# Patient Record
Sex: FEMALE | Race: WHITE | Employment: OTHER | URBAN - METROPOLITAN AREA
[De-identification: names, ages, dates, MRNs, and addresses within clinical notes are randomized per-mention and may not be internally consistent; named-entity substitution may affect disease eponyms.]

---

## 2017-03-23 NOTE — PROGRESS NOTES
SUBJECTIVE:                                                    Nisha Perez is a 61 year old female who presents to clinic today for the following health issues:      History of Present Illness   Depression & Anxiety Follow-up:     Depression/Anxiety:  Anxiety only    Status since last visit::  Stable    Other associated symptoms of anxiety::  None    Significant life event::  No    Current substance use::  Alcohol    Depression symptoms::  None      Medication Followup of Dexilant    Taking Medication as prescribed: yes    Side Effects:  None    Medication Helping Symptoms:  yes       Problem list and histories reviewed & adjusted, as indicated.  Additional history: as documented      Patient Active Problem List   Diagnosis     iamJOINT DERANGEMENT NEC-L/LEG     Subluxation of patella, acquired     Aftercare following joint replacement     Knee joint replacement by other means     Abnormal gait     Anxiety disorder     Advanced directives, counseling/discussion     Phobia, flying     Gastroesophageal reflux disease without esophagitis     Past Surgical History:   Procedure Laterality Date     APPENDECTOMY       ARTHRODESIS ANKLE      left     ARTHROPLASTY PATELLO-FEMORAL (KNEE)  2011    Procedure:ARTHROPLASTY PATELLO-FEMORAL (KNEE); Left Knee Mason Arthrolplasty Prosthesis, Removal of hardware left knee; Surgeon:TORI LLOYD; [facility]     ARTHROSCOPY KNEE      left     ARTHROSCOPY KNEE      right      SECTION      X 4     TONSILLECTOMY         Social History   Substance Use Topics     Smoking status: Never Smoker     Smokeless tobacco: Not on file     Alcohol use Yes      Comment: 4-6 drinks per week     Family History   Problem Relation Age of Onset     DIABETES No family hx of      Coronary Artery Disease No family hx of      Hypertension No family hx of      Hyperlipidemia No family hx of      CEREBROVASCULAR DISEASE No family hx of      Breast Cancer No family hx of      Anxiety  Disorder No family hx of      OSTEOPOROSIS No family hx of      Obesity No family hx of      Depression No family hx of      Colon Cancer No family hx of      Prostate Cancer No family hx of      Other Cancer No family hx of      MENTAL ILLNESS No family hx of      Substance Abuse No family hx of      Asthma No family hx of      Genetic Disorder No family hx of      Thyroid Disease No family hx of      Anesthesia Reaction No family hx of          Current Outpatient Prescriptions   Medication Sig Dispense Refill     dexlansoprazole (DEXILANT) 60 MG CPDR CR capsule Take 1 capsule (60 mg) by mouth daily 90 capsule 3     ALPRAZolam (XANAX) 0.25 MG tablet Take 1 tablet (0.25 mg) by mouth as needed for other (30 min before flying) 20 tablet 0     estradiol (ESTRACE) 1 MG tablet   3     zolpidem (AMBIEN) 5 MG tablet   0     cetirizine (ZYRTEC) 10 MG tablet Take 10 mg by mouth daily.       diphenhydrAMINE (BENADRYL) 25 MG tablet Take 25 mg by mouth every 6 hours as needed.       PROgesterone (PROMETRIUM) 100 MG capsule Take 100 mg by mouth daily.       ORDER FOR DME One CPM machine to go with patient.  Start 0-30deg and advance as tolerated per Dr. Dai's TKA protocol. 1 Device 0     Pseudoephedrine HCl (SUDAFED PO) Take  by mouth at onset of headache.       Allergies   Allergen Reactions     Latex Itching     burning     BP Readings from Last 3 Encounters:   03/28/17 140/90   09/13/16 108/80   04/20/16 128/88    Wt Readings from Last 3 Encounters:   03/28/17 114 lb (51.7 kg)   09/13/16 112 lb (50.8 kg)   04/20/16 117 lb 3.2 oz (53.2 kg)                  Labs reviewed in EPIC    ROS:  Constitutional, HEENT, cardiovascular, pulmonary, gi and gu systems are negative, except as otherwise noted.    OBJECTIVE:                                                    /90  Pulse 74  Temp 98.4  F (36.9  C)  Wt 114 lb (51.7 kg)  BMI 24.67 kg/m2  Body mass index is 24.67 kg/(m^2).  Physical Exam   Constitutional: She appears  well-developed and well-nourished.   HENT:   Head: Normocephalic and atraumatic.   Cardiovascular: Normal rate and regular rhythm.    Pulmonary/Chest: Effort normal and breath sounds normal.   Psychiatric: She has a normal mood and affect.         Diagnostic Test Results:  none      ASSESSMENT/PLAN:                                                      Problem List Items Addressed This Visit     Phobia, flying     [place]   Short scipt for xanax given for flying   Discussed against driving on these meds  No concerns for abuse         Relevant Medications    ALPRAZolam (XANAX) 0.25 MG tablet    Gastroesophageal reflux disease without esophagitis - Primary     Stable on Dexilant  Refill provided  Discussed diet and lifestyle changes         Relevant Medications    dexlansoprazole (DEXILANT) 60 MG CPDR CR capsule      Other Visit Diagnoses     Need for hepatitis C screening test        Relevant Orders    Hepatitis C Screen Reflex to HCV RNA Quant and Genotype             Olivia Kaur MD  [facility]

## 2017-03-28 ENCOUNTER — OFFICE VISIT (OUTPATIENT)
Dept: FAMILY MEDICINE | Facility: OTHER | Age: 62
End: 2017-03-28
Payer: COMMERCIAL

## 2017-03-28 VITALS
WEIGHT: 114 LBS | SYSTOLIC BLOOD PRESSURE: 140 MMHG | BODY MASS INDEX: 24.67 KG/M2 | HEART RATE: 74 BPM | DIASTOLIC BLOOD PRESSURE: 90 MMHG | TEMPERATURE: 98.4 F

## 2017-03-28 DIAGNOSIS — Z11.59 NEED FOR HEPATITIS C SCREENING TEST: ICD-10-CM

## 2017-03-28 DIAGNOSIS — K21.9 GASTROESOPHAGEAL REFLUX DISEASE WITHOUT ESOPHAGITIS: Primary | ICD-10-CM

## 2017-03-28 DIAGNOSIS — F40.243 PHOBIA, FLYING: ICD-10-CM

## 2017-03-28 PROCEDURE — 99213 OFFICE O/P EST LOW 20 MIN: CPT | Performed by: FAMILY MEDICINE

## 2017-03-28 RX ORDER — DEXLANSOPRAZOLE 60 MG/1
60 CAPSULE, DELAYED RELEASE ORAL DAILY
Qty: 90 CAPSULE | Refills: 3 | Status: SHIPPED | OUTPATIENT
Start: 2017-03-28 | End: 2019-03-11

## 2017-03-28 RX ORDER — ALPRAZOLAM 0.25 MG
0.25 TABLET ORAL PRN
Qty: 20 TABLET | Refills: 0 | Status: SHIPPED | OUTPATIENT
Start: 2017-03-28 | End: 2019-02-27

## 2017-03-28 ASSESSMENT — PAIN SCALES - GENERAL: PAINLEVEL: NO PAIN (0)

## 2017-03-28 ASSESSMENT — ANXIETY QUESTIONNAIRES
7. FEELING AFRAID AS IF SOMETHING AWFUL MIGHT HAPPEN: 0 = NOT AT ALL
GAD7 TOTAL SCORE: 0

## 2017-03-28 NOTE — ASSESSMENT & PLAN NOTE
[place]   Short scipt for xanax given for flying   Discussed against driving on these meds  No concerns for abuse

## 2017-04-18 ENCOUNTER — ALLIED HEALTH/NURSE VISIT (OUTPATIENT)
Dept: FAMILY MEDICINE | Facility: OTHER | Age: 62
End: 2017-04-18
Payer: COMMERCIAL

## 2017-04-18 DIAGNOSIS — H61.22 IMPACTED CERUMEN OF LEFT EAR: Primary | ICD-10-CM

## 2017-04-18 PROCEDURE — 99207 ZZC NO CHARGE NURSE ONLY: CPT

## 2017-04-18 NOTE — PROGRESS NOTES
Nisha Perez is a 61 year old female who presents in clinic for possible impacted cerumen.    SYMPTOMS:  Pt reports bilateral ears feel plugged and has some muffled hearing. Was on an airplane recently and felt them popping. Reports L side is worse.    Hx of cerumen impaction?   Yes  Hx of surgery or rupture?  No (if yes, huddle with provider)  OBJECTIVE:  Patient appears in no distress  HEENT: left side  ear canal occluded with cerumen.  Reports feeling some pressure. No impaction visualized by RN or provider on R side.     PLAN:  Cerumenosis is noted.  Wax to be removed on L ear by MA staff by syringing and manual debridement.   MA staff could only obtained small amount via flush from L side. Provider needed for manual debridement.    NURSING PLAN: Huddle with provider, plan includes ok for removal on L side. Needed manual debridement.     RECOMMENDED DISPOSITION: Provider recommends debrox drops for small amount of remaining wax. Return for new, worsening sxs.  Will comply with recommendation: Yes     Message handled by Nurse Triage with Huddle - provider name: Darby Bocanegra NP.    PARRIS GALINDO RN

## 2017-04-18 NOTE — PROGRESS NOTES
Earwash attempted. Not much success getting wax out. Huddled with ALFONSO Suggs again and she took it from there.

## 2017-04-18 NOTE — MR AVS SNAPSHOT
"              After Visit Summary   2017    Nisha Perez    MRN: [id number]           Patient Information     Date Of Birth          1955        Visit Information        Provider Department      2017 10:30 AM [facility], [facility]        Today's Diagnoses     Impacted cerumen of left ear    -  1       Follow-ups after your visit        Who to contact     If you have questions or need follow up information about today's clinic visit or your schedule please contact [facility] directly at [phone].  Normal or non-critical lab and imaging results will be communicated to you by Speekhart, letter or phone within 4 business days after the clinic has received the results. If you do not hear from us within 7 days, please contact the clinic through Speekhart or phone. If you have a critical or abnormal lab result, we will notify you by phone as soon as possible.  Submit refill requests through Solavista or call your pharmacy and they will forward the refill request to us. Please allow 3 business days for your refill to be completed.          Additional Information About Your Visit        MyChart Information     Solavista lets you send messages to your doctor, view your test results, renew your prescriptions, schedule appointments and more. To sign up, go to [email or web address] . Click on \"Log in\" on the left side of the screen, which will take you to the Welcome page. Then click on \"Sign up Now\" on the right side of the page.     You will be asked to enter the access code listed below, as well as some personal information. Please follow the directions to create your username and password.     Your access code is: [email or web address]  Expires: 2017 10:25 AM     Your access code will  in 90 days. If you need help or a new code, please call your [facility] or [phone].        Care EveryWhere ID     This is your Care EveryWhere ID. This could be used by " other organizations to access your [place] medical records  [id number]         Blood Pressure from Last 3 Encounters:   03/28/17 140/90   09/13/16 108/80   04/20/16 128/88    Weight from Last 3 Encounters:   03/28/17 114 lb (51.7 kg)   09/13/16 112 lb (50.8 kg)   04/20/16 117 lb 3.2 oz (53.2 kg)              Today, you had the following     No orders found for display       Primary Care Provider Office Phone # Fax #    Roderick Hunter [phone]       [facility] [address] [facility] [id number]        Thank you!     Thank you for choosing [facility]  for your care. Our goal is always to provide you with excellent care. Hearing back from our patients is one way we can continue to improve our services. Please take a few minutes to complete the written survey that you may receive in the mail after your visit with us. Thank you!             Your Updated Medication List - Protect others around you: Learn how to safely use, store and throw away your medicines at www.disposemymeds.org.          This list is accurate as of: 4/18/17 11:20 AM.  Always use your most recent med list.                   Brand Name Dispense Instructions for use    ALPRAZolam 0.25 MG tablet    XANAX    20 tablet    Take 1 tablet (0.25 mg) by mouth as needed for other (30 min before flying)       BENADRYL 25 MG tablet   Generic drug:  diphenhydrAMINE      Take 25 mg by mouth every 6 hours as needed.       dexlansoprazole 60 MG Cpdr CR capsule    DEXILANT    90 capsule    Take 1 capsule (60 mg) by mouth daily       estradiol 1 MG tablet    ESTRACE         order for DME     1 Device    One CPM machine to go with patient. Start 0-30deg and advance as tolerated per Dr. Dai's TKA protocol.       progesterone 100 MG capsule    PROMETRIUM     Take 100 mg by mouth daily.       SUDAFED PO      Take  by mouth at onset of headache.       zolpidem 5 MG tablet    AMBIEN         ZYRTEC 10 MG tablet    Generic drug:  cetirizine      Take 10 mg by mouth daily.

## 2017-08-17 ENCOUNTER — TELEPHONE (OUTPATIENT)
Dept: FAMILY MEDICINE | Facility: CLINIC | Age: 62
End: 2017-08-17

## 2017-08-17 NOTE — TELEPHONE ENCOUNTER
Summary:    Patient is due/failing the following:   COLONOSCOPY and LDL    Action needed:   Patient needs fasting lab only appointment and schedule a colonoscopy or complete a FIT test     Type of outreach:    none per care everywhere UTD    Questions for provider review:    None                                                                                                                                    Ofelia Riggs  Please abstract the following data from this visit with this patient into the appropriate field in Epic:    Colonoscopy done on this date: 05/1/2010 (approximately), by this group: [facility], in care everywhere ( patient reported)      Chart routed to [employer]  .        Panel Management Review      Patient has the following on her problem list: None      Composite cancer screening  Chart review shows that this patient is due/due soon for the following Colonoscopy

## 2018-01-05 ENCOUNTER — TELEPHONE (OUTPATIENT)
Dept: FAMILY MEDICINE | Facility: CLINIC | Age: 63
End: 2018-01-05

## 2018-01-05 NOTE — TELEPHONE ENCOUNTER
RN triage requested--    Did remove my name as PCP--usually seen in [place] and I have only seen this patient once for an acute problem.    Roderick Hunter MD     Detail Level: Zone Include Location In Plan?: No

## 2018-01-05 NOTE — TELEPHONE ENCOUNTER
"Nisha Perez is a 62 year old female who calls with thumb pain and swelling.    NURSING ASSESSMENT:  Description:  I spoke with patient who stated yesterday her thumb has started to swell and is painful.   Onset/duration:  Yesterday  Precip. factors:  Has always had \"arthritis\" in this thumb  Associated symptoms:  Feels like she has all over joint pain discomfort. Pt also states she has a ring she is unable to remove.  Improves/worsens symptoms:  Has tried icing and ibuprofen with some improvement with discomfort but not swelling  Pain scale (0-10)   6-7/10  Last exam/Treatment:  3/28/2017    Allergies:   Allergies   Allergen Reactions     Latex Itching     burning     RECOMMENDED DISPOSITION:  To ED-Huddled with KL  Will comply with recommendation: Yes  If further questions/concerns or if symptoms do not improve, worsen or new symptoms develop, call your PCP or [facility] as soon as possible.      Guideline used: joint pain/swelling  Telephone Triage Protocols for Nurses, Fifth Edition, Emilia Dominguez RN    "

## 2018-01-05 NOTE — TELEPHONE ENCOUNTER
Reason for call:  Symptom  Reason for call:  Patient reporting a symptom    Symptom or request: right thumb pain and swelling    Duration (how long have symptoms been present): over night    Have you been treated for this before? Yes    Additional comments: pt has arthritis in her thumb joint but she isnt sure why she woke up with her thumb swelled up and is throbbing. Please advise.     Phone Number patient can be reached at:  Cell number on file:    Telephone Information:   Mobile [phone]       Best Time:  anytime    Can we leave a detailed message on this number:  YES    Call taken on 1/5/2018 at 7:57 AM by Luba Santos

## 2018-04-23 ENCOUNTER — TELEPHONE (OUTPATIENT)
Dept: FAMILY MEDICINE | Facility: OTHER | Age: 63
End: 2018-04-23

## 2018-04-23 DIAGNOSIS — K21.9 GASTROESOPHAGEAL REFLUX DISEASE WITHOUT ESOPHAGITIS: ICD-10-CM

## 2018-04-23 RX ORDER — DEXLANSOPRAZOLE 60 MG/1
60 CAPSULE, DELAYED RELEASE ORAL DAILY
Qty: 90 CAPSULE | Refills: 3 | Status: CANCELLED | OUTPATIENT
Start: 2018-04-23

## 2018-04-23 NOTE — TELEPHONE ENCOUNTER
Completed physical with [facility] and has completed it this year already. Discussed having her other provider to refill medication. Patient will contact [facility] where she completed her physical. If she needs refill through [facility] she will call back. Discussed RN is only able to give 30 days supply as her LOV was 03/28/2017. Patient stated she needs the prescription to be written for 90 day supply due to cost. Will await to hear back from patient.  Cristiane Cota, RN, BSN

## 2018-04-23 NOTE — TELEPHONE ENCOUNTER
Reason for Call:  Other prescription Dexilant 60 MG    Detailed comments: Pt calling back, stating that her [place] [facility] won't refill her rx for the amount of MG it is, but patient can't lower the dose any, so she is coming back here to refill it. She states she gets it from [place] and needs the rx faxed over there: the number is: [phone] and the patient's ID number is: [id number] She requests a call back.     Phone Number Patient can be reached at: Home number on file [phone] (home)    Best Time: any     Can we leave a detailed message on this number? YES    Call taken on 4/23/2018 at 1:16 PM by Nhung Fink

## 2018-04-23 NOTE — TELEPHONE ENCOUNTER
Reason for call:  Medication  Reason for Call:  Medication or medication refill:    Do you use a [facility]?  Name of the pharmacy and phone number for the current request:  Patient wants to pick it up cause she gets it in [place]    Name of the medication requested: Dexilant 60 mg     Other request: none    Can we leave a detailed message on this number? YES    Phone number patient can be reached at: Cell number on file:    Telephone Information:   Mobile [phone]       Best Time: anytime    Call taken on 4/23/2018 at 11:27 AM by Adal Cuevas

## 2018-04-23 NOTE — TELEPHONE ENCOUNTER
Pt notified. She states her OBGYN filled this. RN please remove med and close encounter.  Birdie Mendoza, CMA

## 2018-04-23 NOTE — TELEPHONE ENCOUNTER
dexlansoprazole (DEXILANT) 60 MG CPDR CR capsule  Routing refill request to provider for review/approval because:  Patient needs to be seen because it has been more than 1 year since last office visit.  Patient is completing physicals with her OB/GYN in [place], that provider will not fill this prescription due to the does.   Patient is requesting 90 day supplies  Cristiane Cota RN, BSN

## 2018-06-06 ENCOUNTER — RADIANT APPOINTMENT (OUTPATIENT)
Dept: MAMMOGRAPHY | Facility: OTHER | Age: 63
End: 2018-06-06
Payer: COMMERCIAL

## 2018-06-06 DIAGNOSIS — Z12.31 VISIT FOR SCREENING MAMMOGRAM: ICD-10-CM

## 2018-06-06 PROCEDURE — 77067 SCR MAMMO BI INCL CAD: CPT | Mod: TC

## 2018-06-16 ENCOUNTER — HEALTH MAINTENANCE LETTER (OUTPATIENT)
Age: 63
End: 2018-06-16

## 2018-06-19 ENCOUNTER — TRANSFERRED RECORDS (OUTPATIENT)
Dept: HEALTH INFORMATION MANAGEMENT | Facility: CLINIC | Age: 63
End: 2018-06-19

## 2018-06-19 LAB
CHOLEST SERPL-MCNC: 292 MG/DL
GLUCOSE SERPL-MCNC: 92 MG/DL (ref 74–106)
HDLC SERPL-MCNC: 92 MG/DL
LDLC SERPL CALC-MCNC: 181 MG/DL
TRIGL SERPL-MCNC: 94 MG/DL
TSH SERPL-ACNC: 1.43 UIU/ML (ref 0.36–3.74)

## 2018-10-02 ENCOUNTER — ANESTHESIA (OUTPATIENT)
Dept: GASTROENTEROLOGY | Facility: CLINIC | Age: 63
End: 2018-10-02
Payer: COMMERCIAL

## 2018-10-02 ENCOUNTER — HOSPITAL ENCOUNTER (OUTPATIENT)
Facility: CLINIC | Age: 63
Discharge: HOME | End: 2018-10-02
Attending: COLON & RECTAL SURGERY | Admitting: COLON & RECTAL SURGERY
Payer: COMMERCIAL

## 2018-10-02 ENCOUNTER — ANESTHESIA EVENT (OUTPATIENT)
Dept: GASTROENTEROLOGY | Facility: CLINIC | Age: 63
End: 2018-10-02
Payer: COMMERCIAL

## 2018-10-02 ENCOUNTER — SURGERY (OUTPATIENT)
Age: 63
End: 2018-10-02

## 2018-10-02 VITALS
SYSTOLIC BLOOD PRESSURE: 115 MMHG | DIASTOLIC BLOOD PRESSURE: 80 MMHG | OXYGEN SATURATION: 98 % | RESPIRATION RATE: 16 BRPM | HEIGHT: 58 IN | BODY MASS INDEX: 25.19 KG/M2 | WEIGHT: 120 LBS

## 2018-10-02 LAB — COLONOSCOPY: NORMAL

## 2018-10-02 PROCEDURE — 25000128 H RX IP 250 OP 636: Performed by: NURSE ANESTHETIST, CERTIFIED REGISTERED

## 2018-10-02 PROCEDURE — 40000010 ZZH STATISTIC ANES STAT CODE-CRNA PER MINUTE: Performed by: COLON & RECTAL SURGERY

## 2018-10-02 PROCEDURE — 37000009 ZZH ANESTHESIA TECHNICAL FEE, EACH ADDTL 15 MIN: Performed by: COLON & RECTAL SURGERY

## 2018-10-02 PROCEDURE — 88305 TISSUE EXAM BY PATHOLOGIST: CPT | Performed by: COLON & RECTAL SURGERY

## 2018-10-02 PROCEDURE — 25000125 ZZHC RX 250: Performed by: NURSE ANESTHETIST, CERTIFIED REGISTERED

## 2018-10-02 PROCEDURE — 45385 COLONOSCOPY W/LESION REMOVAL: CPT | Performed by: COLON & RECTAL SURGERY

## 2018-10-02 PROCEDURE — 37000008 ZZH ANESTHESIA TECHNICAL FEE, 1ST 30 MIN: Performed by: COLON & RECTAL SURGERY

## 2018-10-02 PROCEDURE — 88305 TISSUE EXAM BY PATHOLOGIST: CPT | Mod: 26 | Performed by: COLON & RECTAL SURGERY

## 2018-10-02 RX ORDER — FLUMAZENIL 0.1 MG/ML
0.2 INJECTION, SOLUTION INTRAVENOUS
Status: DISCONTINUED | OUTPATIENT
Start: 2018-10-02 | End: 2018-10-02 | Stop reason: HOSPADM

## 2018-10-02 RX ORDER — PROPOFOL 10 MG/ML
INJECTION, EMULSION INTRAVENOUS CONTINUOUS PRN
Status: DISCONTINUED | OUTPATIENT
Start: 2018-10-02 | End: 2018-10-02

## 2018-10-02 RX ORDER — NALOXONE HYDROCHLORIDE 0.4 MG/ML
.1-.4 INJECTION, SOLUTION INTRAMUSCULAR; INTRAVENOUS; SUBCUTANEOUS
Status: DISCONTINUED | OUTPATIENT
Start: 2018-10-02 | End: 2018-10-02 | Stop reason: HOSPADM

## 2018-10-02 RX ORDER — LIDOCAINE 40 MG/G
CREAM TOPICAL
Status: DISCONTINUED | OUTPATIENT
Start: 2018-10-02 | End: 2018-10-02 | Stop reason: HOSPADM

## 2018-10-02 RX ORDER — ONDANSETRON 2 MG/ML
4 INJECTION INTRAMUSCULAR; INTRAVENOUS EVERY 6 HOURS PRN
Status: DISCONTINUED | OUTPATIENT
Start: 2018-10-02 | End: 2018-10-02 | Stop reason: HOSPADM

## 2018-10-02 RX ORDER — PROPOFOL 10 MG/ML
INJECTION, EMULSION INTRAVENOUS PRN
Status: DISCONTINUED | OUTPATIENT
Start: 2018-10-02 | End: 2018-10-02

## 2018-10-02 RX ORDER — SODIUM CHLORIDE, SODIUM LACTATE, POTASSIUM CHLORIDE, CALCIUM CHLORIDE 600; 310; 30; 20 MG/100ML; MG/100ML; MG/100ML; MG/100ML
INJECTION, SOLUTION INTRAVENOUS CONTINUOUS PRN
Status: DISCONTINUED | OUTPATIENT
Start: 2018-10-02 | End: 2018-10-02

## 2018-10-02 RX ORDER — ONDANSETRON 2 MG/ML
4 INJECTION INTRAMUSCULAR; INTRAVENOUS
Status: DISCONTINUED | OUTPATIENT
Start: 2018-10-02 | End: 2018-10-02 | Stop reason: HOSPADM

## 2018-10-02 RX ORDER — ONDANSETRON 4 MG/1
4 TABLET, ORALLY DISINTEGRATING ORAL EVERY 6 HOURS PRN
Status: DISCONTINUED | OUTPATIENT
Start: 2018-10-02 | End: 2018-10-02 | Stop reason: HOSPADM

## 2018-10-02 RX ORDER — PROCHLORPERAZINE MALEATE 10 MG
10 TABLET ORAL EVERY 6 HOURS PRN
Status: DISCONTINUED | OUTPATIENT
Start: 2018-10-02 | End: 2018-10-02 | Stop reason: HOSPADM

## 2018-10-02 RX ADMIN — PROPOFOL 20 MG: 10 INJECTION, EMULSION INTRAVENOUS at 10:41

## 2018-10-02 RX ADMIN — SODIUM CHLORIDE, POTASSIUM CHLORIDE, SODIUM LACTATE AND CALCIUM CHLORIDE: 600; 310; 30; 20 INJECTION, SOLUTION INTRAVENOUS at 10:37

## 2018-10-02 RX ADMIN — PROPOFOL 20 MG: 10 INJECTION, EMULSION INTRAVENOUS at 10:44

## 2018-10-02 RX ADMIN — PROPOFOL 150 MCG/KG/MIN: 10 INJECTION, EMULSION INTRAVENOUS at 10:40

## 2018-10-02 RX ADMIN — DEXMEDETOMIDINE HYDROCHLORIDE 8 MCG: 100 INJECTION, SOLUTION INTRAVENOUS at 10:44

## 2018-10-02 RX ADMIN — PROPOFOL 10 MG: 10 INJECTION, EMULSION INTRAVENOUS at 11:09

## 2018-10-02 RX ADMIN — PROPOFOL 10 MG: 10 INJECTION, EMULSION INTRAVENOUS at 10:49

## 2018-10-02 NOTE — ANESTHESIA CARE TRANSFER NOTE
Patient: Nisha Perez    Procedure(s):  COLONOSCOPY - Wound Class: II-Clean Contaminated    Diagnosis: SCREENING  Diagnosis Additional Information: No value filed.    Anesthesia Type:   MAC     Note:  Airway :Room Air  Patient transferred to:PACU  Handoff Report: Identifed the Patient, Identified the Reponsible Provider, Reviewed the pertinent medical history, Discussed the surgical course, Reviewed Intra-OP anesthesia mangement and issues during anesthesia, Set expectations for post-procedure period and Allowed opportunity for questions and acknowledgement of understanding    After procedure in [facility] Procedure [place] under monitored anesthesia care (MAC), patient exhibited spontaneous respirations, patient breathing room air with oxygen saturation maintained greater than 95%, patient brought to [facility] recovery bay for postprocedure recovery, SpO2, NiBP, and EKG monitors and alarms on and functioning, report on patient's clinical status given to PACU RN, RN questions answered.    Electronically Signed By: MAKENNA Caballero CRNA  October 2, 2018  11:21 AM

## 2018-10-02 NOTE — ANESTHESIA POSTPROCEDURE EVALUATION
Patient: Nisha Perez    Procedure(s):  COLONOSCOPY - Wound Class: II-Clean Contaminated    Diagnosis:SCREENING  Diagnosis Additional Information: No value filed.    Anesthesia Type:  MAC    Note:  Anesthesia Post Evaluation    Patient location during evaluation: PACU  Patient participation: Able to fully participate in evaluation  Level of consciousness: awake  Pain management: adequate  Airway patency: patent  Cardiovascular status: acceptable  Respiratory status: acceptable  Hydration status: acceptable  PONV: none     Anesthetic complications: None          Last vitals:  Vitals:    10/02/18 1140 10/02/18 1150 10/02/18 1151   BP: 119/83 115/80    Resp:      SpO2:   98%         Electronically Signed By: Ronald Holman MD  October 2, 2018  1:19 PM

## 2018-10-02 NOTE — ANESTHESIA PREPROCEDURE EVALUATION
Anesthesia Evaluation     .             ROS/MED HX    ENT/Pulmonary:  - neg pulmonary ROS     Neurologic:  - neg neurologic ROS     Cardiovascular:  - neg cardiovascular ROS       METS/Exercise Tolerance:  >4 METS   Hematologic:         Musculoskeletal:         GI/Hepatic:     (+) GERD Asymptomatic on medication,       Renal/Genitourinary:      (-) renal disease   Endo:         Psychiatric:         Infectious Disease:         Malignancy:         Other:                     Physical Exam  Normal systems: dental    Airway   Mallampati: II  TM distance: >3 FB  Neck ROM: full    Dental     Cardiovascular   Rhythm and rate: regular and normal      Pulmonary    breath sounds clear to auscultation                    Anesthesia Plan      History & Physical Review  History and physical reviewed and following examination; no interval change.    ASA Status:  2 .        Plan for MAC          Postoperative Care      Consents  Anesthetic plan, risks, benefits and alternatives discussed with:  Patient..                          .

## 2018-10-02 NOTE — H&P
Pre-Endoscopy History and Physical   Nisha Perez MRN# [id number]   YOB: 1955 Age: 63 year old   Date of Procedure: 10/2/2018   Primary care provider: Olivia Kaur   Type of Endoscopy: colonoscopy   Reason for Procedure: screening   Type of Anesthesia Anticipated: Moderate Sedation   HPI:   Nisha is a 63 year old female for screening colonoscopy.  She last had a colonoscopy in 2007 that was incomplete - this was followed by a barium enema which was normal.  She denies BRBPR, abdominal pain, nausea/vomiting, changes in bowel habits or unintentional weight loss.  Her mother had colon cancer in her 80s.    Allergies   Allergen Reactions     Latex Itching     burning      Prior to Admission Medications   Prescriptions Last Dose Informant Patient Reported? Taking?   ALPRAZolam (XANAX) 0.25 MG tablet More than a month  No No   Sig: Take 1 tablet (0.25 mg) by mouth as needed for other (30 min before flying)   GABAPENTIN PO 10/2/2018  Yes Yes   Sig: Take 300 mg by mouth 3 times daily   ORDER FOR DME   No No   Sig: One CPM machine to go with patient.  Start 0-30deg and advance as tolerated per Dr. Dai's TKA protocol.   Pseudoephedrine HCl (SUDAFED PO) Past Week  Yes Yes   Sig: Take  by mouth at onset of headache.   cetirizine (ZYRTEC) 10 MG tablet 10/2/2018  Yes Yes   Sig: Take 10 mg by mouth daily.   dexlansoprazole (DEXILANT) 60 MG CPDR CR capsule 10/2/2018  No Yes   Sig: Take 1 capsule (60 mg) by mouth daily   diphenhydrAMINE (BENADRYL) 25 MG tablet Past Week  Yes Yes   Sig: Take 25 mg by mouth every 6 hours as needed.   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 means     Abnormal gait     Anxiety disorder     Advanced directives, counseling/discussion      "Phobia, flying     Gastroesophageal reflux disease without esophagitis      Past Medical History:   Diagnosis Date     Anxiety disorder      Gastro-oesophageal reflux disease     esophagitis     Injury of knee, left      Insomnia      Osteoarthritis      Osteoporosis       Past Surgical History:   Procedure Laterality Date     APPENDECTOMY       ARTHRODESIS ANKLE      left     ARTHROPLASTY PATELLO-FEMORAL (KNEE)  2011    Procedure:ARTHROPLASTY PATELLO-FEMORAL (KNEE); Left Knee Latia Arthrolplasty Prosthesis, Removal of hardware left knee; Surgeon:TORI LLOYD; [facility]     ARTHROSCOPY KNEE      left     ARTHROSCOPY KNEE      right      SECTION      X 4     TONSILLECTOMY        Social History   Substance Use Topics     Smoking status: Never Smoker     Smokeless tobacco: Never Used     Alcohol use Yes      Comment: 4-6 drinks per week      Family History   Problem Relation Age of Onset     Colon Cancer Mother      Diabetes No family hx of      Coronary Artery Disease No family hx of      Hypertension No family hx of      Hyperlipidemia No family hx of      Cerebrovascular Disease No family hx of      Breast Cancer No family hx of      Anxiety Disorder No family hx of      Osteoporosis No family hx of      Obesity No family hx of      Depression No family hx of      Prostate Cancer No family hx of      Other Cancer No family hx of      Mental Illness No family hx of      Substance Abuse No family hx of      Asthma No family hx of      Genetic Disorder No family hx of      Thyroid Disease No family hx of      Anesthesia Reaction No family hx of       PHYSICAL EXAM:   BP (!) 166/106  Resp 16  Ht 1.473 m (4' 10\")  Wt 54.4 kg (120 lb)  SpO2 98%  BMI 25.08 kg/m2 Estimated body mass index is 25.08 kg/(m^2) as calculated from the following:    Height as of this encounter: 1.473 m (4' 10\").    Weight as of this encounter: 54.4 kg (120 lb).   RESP: lungs clear to auscultation - no rales, rhonchi or " wheezes   CV: regular rates and rhythm o}    Assessment: 64 y/o woman for screening colonoscopy    Plan: Colonoscopy with deep sedation.  Risks of the procedure were discussed including, but not limited to, bleeding, perforation and missed lesions.  Patient understands and is willing to proceed.    Daniel Magana MD ....................  10/2/2018   10:31 AM  Colon and Rectal Surgery Staff  [phone]

## 2018-10-03 LAB — COPATH REPORT: NORMAL

## 2018-12-04 ENCOUNTER — TELEPHONE (OUTPATIENT)
Dept: ORTHOPEDICS | Facility: CLINIC | Age: 63
End: 2018-12-04

## 2018-12-04 NOTE — TELEPHONE ENCOUNTER
----- Message from Angela Le sent at 12/4/2018  3:28 PM CST -----  Regarding: Pt wondering if she can see Dr. Dai for rt knee FU   Contact: [phone]  Pt wondering if she can see Dr. Dai for rt knee Follow-up. Pt had her left knee operated here at the  but had her rt knee done by someone else @ [facility]. Now her rt knee is in pain and was wondering if she can Follow-up with Dr. Dai. Please call pt to see if it's okay or no since Dr. Dai was not the one who operated on the rt knee. Pt can be reached at [phone].    Thank you,  Angela

## 2018-12-04 NOTE — TELEPHONE ENCOUNTER
Patient was called back and she stated that she had  Aright knee scope done by Dr. Pelayo at [facility] because Dr. Dai was out of the country.  She was offered and accepted an appointment on 2/5/2019 at 10:45am.  Her case will be reviewed with Dr. Dai next week and if she wants her to see one of her colleagues we will call the patient, if not then we will see her in February.  She is agreeable with this plan.

## 2018-12-12 ENCOUNTER — OFFICE VISIT (OUTPATIENT)
Dept: FAMILY MEDICINE | Facility: OTHER | Age: 63
End: 2018-12-12
Payer: COMMERCIAL

## 2018-12-12 VITALS
SYSTOLIC BLOOD PRESSURE: 122 MMHG | TEMPERATURE: 98 F | WEIGHT: 128.8 LBS | BODY MASS INDEX: 26.92 KG/M2 | OXYGEN SATURATION: 98 % | DIASTOLIC BLOOD PRESSURE: 90 MMHG | RESPIRATION RATE: 20 BRPM | HEART RATE: 104 BPM

## 2018-12-12 DIAGNOSIS — J01.90 ACUTE SINUSITIS WITH SYMPTOMS > 10 DAYS: Primary | ICD-10-CM

## 2018-12-12 DIAGNOSIS — H61.22 IMPACTED CERUMEN OF LEFT EAR: ICD-10-CM

## 2018-12-12 PROCEDURE — 99213 OFFICE O/P EST LOW 20 MIN: CPT | Mod: 25 | Performed by: FAMILY MEDICINE

## 2018-12-12 PROCEDURE — 69209 REMOVE IMPACTED EAR WAX UNI: CPT | Mod: LT | Performed by: FAMILY MEDICINE

## 2018-12-12 NOTE — PATIENT INSTRUCTIONS
Follow up if your symptoms do not improve or worsen after your antibiotic course. Please call for a prescription if you develop vaginal itchiness or discharge from your antibiotic use.

## 2018-12-12 NOTE — PROGRESS NOTES
SUBJECTIVE:   Nisha Perez is a 63 year old female who presents to clinic today for the following health issues:    Sinus Problem        Acute Illness   Acute illness concerns: Sinus Issues  Onset: 2 weeks    Fever: YES    Chills/Sweats: YES    Headache (front/face): YES    Sinus Pressure:YES    Conjunctivitis:  no    Ear Pain: no    Rhinorrhea: YES    Congestion: YES    Sore Throat: no      Cough: YES    Wheeze: no     Decreased Appetite: YES    Nausea: YES    Vomiting: no     Diarrhea:  no     Dysuria/Freq.: no     Fatigue/Achiness: YES    Sick/Strep Exposure: no      Therapies Tried and outcome: Sudafed/Mucinex. Helped a little     She has been ill for about two weeks now, and feels that it is settling in her sinuses, and also has left ear pain.       Problem list and histories reviewed & adjusted, as indicated.  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     Abnormal gait     Anxiety disorder     Advanced directives, counseling/discussion     Phobia, flying     Gastroesophageal reflux disease without esophagitis     Past Surgical History:   Procedure Laterality Date     APPENDECTOMY       ARTHRODESIS ANKLE      left     ARTHROPLASTY PATELLO-FEMORAL (KNEE)  2011    Procedure:ARTHROPLASTY PATELLO-FEMORAL (KNEE); Left Knee [place] Arthrolplasty Prosthesis, Removal of hardware left knee; Surgeon:TORI LLOYD; [facility]     ARTHROSCOPY KNEE      left     ARTHROSCOPY KNEE      right      SECTION      X 4     TONSILLECTOMY         Social History     Tobacco Use     Smoking status: Never Smoker     Smokeless tobacco: Never Used   Substance Use Topics     Alcohol use: Yes     Comment: 4-6 drinks per week     Family History   Problem Relation Age of Onset     Colon Cancer Mother      Diabetes No family hx of       Coronary Artery Disease No family hx of      Hypertension No family hx of      Hyperlipidemia No family hx of      Cerebrovascular Disease No family hx of      Breast Cancer No family hx of      Anxiety Disorder No family hx of      Osteoporosis No family hx of      Obesity No family hx of      Depression No family hx of      Prostate Cancer No family hx of      Other Cancer No family hx of      Mental Illness No family hx of      Substance Abuse No family hx of      Asthma No family hx of      Genetic Disorder No family hx of      Thyroid Disease No family hx of      Anesthesia Reaction No family hx of          Current Outpatient Medications   Medication Sig Dispense Refill     amoxicillin-clavulanate (AUGMENTIN) 875-125 MG tablet Take 1 tablet by mouth 2 times daily for 10 days 20 tablet 0     cetirizine (ZYRTEC) 10 MG tablet Take 10 mg by mouth daily.       dexlansoprazole (DEXILANT) 60 MG CPDR CR capsule Take 1 capsule (60 mg) by mouth daily 90 capsule 3     diphenhydrAMINE (BENADRYL) 25 MG tablet Take 25 mg by mouth every 6 hours as needed.       Pseudoephedrine HCl (SUDAFED PO) Take  by mouth at onset of headache.       zolpidem (AMBIEN) 5 MG tablet   0     ALPRAZolam (XANAX) 0.25 MG tablet Take 1 tablet (0.25 mg) by mouth as needed for other (30 min before flying) (Patient not taking: Reported on 12/12/2018) 20 tablet 0     GABAPENTIN PO Take 300 mg by mouth 3 times daily       Allergies   Allergen Reactions     Latex Itching     burning       ROS:  Constitutional, HEENT, cardiovascular, pulmonary, GI, , musculoskeletal, neuro, skin, endocrine and psych systems are negative, except as in HPI or otherwise noted.     This document serves as a record of the services and decisions personally performed and made by Corry Poole MD. It was created on her behalf by Mary Anne, a trained medical scribe. The creation of this document is based the provider's statements to the medical scribe.  Mary Anne, December  12, 2018 3:34 PM     OBJECTIVE:                                                    /90 (BP Location: Left arm, Patient Position: Chair, Cuff Size: Adult Large)   Pulse 104   Temp 98  F (36.7  C) (Oral)   Resp 20   Wt 58.4 kg (128 lb 12.8 oz)   SpO2 98%   Breastfeeding? No   BMI 26.92 kg/m    Body mass index is 26.92 kg/m .   GENERAL: healthy, alert, well nourished, well hydrated, no distress  HENT: ear canals- left canal partially blocked with cerumen; TMs- normal; Nose- normal; Mouth- no ulcers, no lesions; Sinuses- Left sided sinus tenderness  NECK: left sided cervical adenopathy  RESP: lungs clear to auscultation - no rales, no rhonchi, no wheezes  CV: regular rates and rhythm, normal S1 S2, no S3 or S4 and no murmur, no click or rub -  ABDOMEN: soft, no tenderness, no  hepatosplenomegaly, no masses, normal bowel sounds  SKIN: no suspicious lesions, no rashes to visible skin  PSYCH: Alert and oriented times 3; speech- coherent , normal rate and volume; able to articulate logical thoughts, able to abstract reason, no tangential thoughts, no hallucinations or delusions, affect- normal    Diagnostic test results:  No results found for this or any previous visit (from the past 24 hour(s)).     ASSESSMENT/PLAN:                                                        ICD-10-CM    1. Acute sinusitis with symptoms > 10 days J01.90 amoxicillin-clavulanate (AUGMENTIN) 875-125 MG tablet   2. Impacted cerumen of left ear H61.22 REMOVE IMPACTED CERUMEN     Sinus Infection: Left ear is washed out which relieves some of the discomfort, but the sinus pain and pressure is still present. Will treat with Augmentin course. Advised that this may cause loose stools.       Patient Instructions   Follow up if your symptoms do not improve or worsen after your antibiotic course. Please call for a prescription if you develop vaginal itchiness or discharge from your antibiotic use.      The information in this document, created  by the medical scribe for me, accurately reflects the services I personally performed and the decisions made by me. I have reviewed and approved this document for accuracy.   MD Corry Low MD, MD  [facility]

## 2018-12-20 ENCOUNTER — TELEPHONE (OUTPATIENT)
Dept: FAMILY MEDICINE | Facility: OTHER | Age: 63
End: 2018-12-20

## 2018-12-20 NOTE — TELEPHONE ENCOUNTER
AE- Pt is calling to say she is still not better. Has 3 doses of Augmentin left and states she is not feeling any better.    Ph[facility]acy-[facility] in [place]    Nisha Perez is a 63 year old female who calls with sinus infection.    NURSING ASSESSMENT:  Description:  Facial pain, HA, Congestion, drainage down throat. No fever. No chills and sweats. Nasal discharge is dark yellowish clear and thicker. Pain worse when she bends over. No ear pain. Upper teeth pain. Has 3 dose of the Augmentin left  Onset/duration:  Over 3 weeks    Allergies:   Allergies   Allergen Reactions     Latex Itching     burning     NURSING PLAN: Routed to provider Yes    RECOMMENDED DISPOSITION:  Wait to hear from provider  Will comply with recommendation: Yes  If further questions/concerns or if symptoms do not improve, worsen or new symptoms develop, call your PCP or [facility] as soon as possible.      Guideline used: sinus problems  Telephone Triage Protocols for Nurses, Fifth Edition, Emilia Dominguez RN

## 2018-12-20 NOTE — TELEPHONE ENCOUNTER
She has had 3 weeks of build up, it may still take longer. Would advise to complete the antibiotics unless something is worsening.  Corry Poole MD

## 2018-12-20 NOTE — TELEPHONE ENCOUNTER
Reason for call:  Patient reporting a symptom    Symptom or request: sinus inf ten days ago for sinus inf has been on tnbitiocs and still sick    Duration (how long have symptoms been present): ten days    Have you been treated for this before? Yes    Additional comments: was on antibiotics and is still sick. Please call to advise. Declined appt wanted to lm for nurse    Phone Number patient can be reached at:  Home number on file [phone] (home)    Best Time:  any    Can we leave a detailed message on this number:  YES    Call taken on 12/20/2018 at 1:41 PM by Falguni Cordova

## 2018-12-21 ENCOUNTER — OFFICE VISIT (OUTPATIENT)
Dept: FAMILY MEDICINE | Facility: CLINIC | Age: 63
End: 2018-12-21
Payer: COMMERCIAL

## 2018-12-21 ENCOUNTER — TELEPHONE (OUTPATIENT)
Dept: FAMILY MEDICINE | Facility: CLINIC | Age: 63
End: 2018-12-21

## 2018-12-21 VITALS
SYSTOLIC BLOOD PRESSURE: 124 MMHG | OXYGEN SATURATION: 98 % | WEIGHT: 129.6 LBS | RESPIRATION RATE: 16 BRPM | HEART RATE: 102 BPM | BODY MASS INDEX: 27.09 KG/M2 | DIASTOLIC BLOOD PRESSURE: 92 MMHG | TEMPERATURE: 98.5 F

## 2018-12-21 DIAGNOSIS — R03.0 ELEVATED BLOOD PRESSURE READING WITHOUT DIAGNOSIS OF HYPERTENSION: ICD-10-CM

## 2018-12-21 DIAGNOSIS — J01.90 ACUTE SINUSITIS WITH SYMPTOMS > 10 DAYS: Primary | ICD-10-CM

## 2018-12-21 DIAGNOSIS — N89.8 VAGINAL IRRITATION: ICD-10-CM

## 2018-12-21 PROCEDURE — 99213 OFFICE O/P EST LOW 20 MIN: CPT | Performed by: NURSE PRACTITIONER

## 2018-12-21 RX ORDER — FLUCONAZOLE 150 MG/1
150 TABLET ORAL ONCE
Qty: 1 TABLET | Refills: 0 | Status: SHIPPED | OUTPATIENT
Start: 2018-12-21 | End: 2019-02-21

## 2018-12-21 ASSESSMENT — PAIN SCALES - GENERAL: PAINLEVEL: MODERATE PAIN (4)

## 2018-12-21 NOTE — TELEPHONE ENCOUNTER
I apologize that she is not feeling any better. Is she having fevers? What symptoms is she still having? Does she still have tenderness and pain, fevers, worsening symptoms then I would recommend urgent care.     MAKENNA Gomez CNP

## 2018-12-21 NOTE — PROGRESS NOTES
"  SUBJECTIVE:   Nisha Perez is a 63 year old female who presents to clinic today for the following health issues:    HPI  Acute Illness   Acute illness concerns: Cough, sinus pressure  Onset: 4 weeks    Fever: no     Chills/Sweats: no     Headache (location?): YES    Sinus Pressure:YES    Conjunctivitis:  no    Ear Pain: no    Rhinorrhea/ost nasal drip: YES    Congestion: YES    Sore Throat: no     Vaginal symptoms from antibiotic: \"not yet but I usually get them on abx\"   Cough: YES-productive of clear, yellow, and green sputum, not at night    Wheeze: no     Decreased Appetite: YES slighlty    Nausea: YES    Vomiting: no     Diarrhea:  YES    Dysuria/Freq.: no     Fatigue/Achiness: YES    Sick/Strep Exposure: YES- grandkids     Therapies Tried and outcome: Amoxicillin- finished today reports no improvement, Mucinex- helps with her cough as it thins her postnasal drip, advil, sudafed, nasal saline washes. Denies chest pain or shortness of breath. Denies any previous issues with elevated blood pressure.     Problem list and histories reviewed & adjusted, as indicated.  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     Abnormal gait     Anxiety disorder     Advanced directives, counseling/discussion     Phobia, flying     Gastroesophageal reflux disease without esophagitis     Past Surgical History:   Procedure Laterality Date     APPENDECTOMY       ARTHRODESIS ANKLE      left     ARTHROPLASTY PATELLO-FEMORAL (KNEE)  2011    Procedure:ARTHROPLASTY PATELLO-FEMORAL (KNEE); Left Knee [place] Arthrolplasty Prosthesis, Removal of hardware left knee; Surgeon:TORI LLOYD; [facility]     ARTHROSCOPY KNEE      left     ARTHROSCOPY KNEE      right      SECTION      X 4     TONSILLECTOMY         Social History "     Tobacco Use     Smoking status: Never Smoker     Smokeless tobacco: Never Used   Substance Use Topics     Alcohol use: Yes     Comment: 4-6 drinks per week     Family History   Problem Relation Age of Onset     Colon Cancer Mother      Diabetes No family hx of      Coronary Artery Disease No family hx of      Hypertension No family hx of      Hyperlipidemia No family hx of      Cerebrovascular Disease No family hx of      Breast Cancer No family hx of      Anxiety Disorder No family hx of      Osteoporosis No family hx of      Obesity No family hx of      Depression No family hx of      Prostate Cancer No family hx of      Other Cancer No family hx of      Mental Illness No family hx of      Substance Abuse No family hx of      Asthma No family hx of      Genetic Disorder No family hx of      Thyroid Disease No family hx of      Anesthesia Reaction No family hx of          Current Outpatient Medications   Medication Sig Dispense Refill     amoxicillin-clavulanate (AUGMENTIN) 875-125 MG tablet Take 1 tablet by mouth 2 times daily for 4 days 8 tablet 0     cetirizine (ZYRTEC) 10 MG tablet Take 10 mg by mouth daily.       dexlansoprazole (DEXILANT) 60 MG CPDR CR capsule Take 1 capsule (60 mg) by mouth daily 90 capsule 3     diphenhydrAMINE (BENADRYL) 25 MG tablet Take 25 mg by mouth every 6 hours as needed.       fluconazole (DIFLUCAN) 150 MG tablet Take 1 tablet (150 mg) by mouth once for 1 dose 1 tablet 0     Pseudoephedrine HCl (SUDAFED PO) Take  by mouth at onset of headache.       zolpidem (AMBIEN) 5 MG tablet   0     ALPRAZolam (XANAX) 0.25 MG tablet Take 1 tablet (0.25 mg) by mouth as needed for other (30 min before flying) (Patient not taking: Reported on 12/12/2018) 20 tablet 0     GABAPENTIN PO Take 300 mg by mouth 3 times daily       Allergies   Allergen Reactions     Latex Itching     burning     Recent Labs   Lab Test 04/09/14 11/08/11 07/22/11  0547   HDL 71 79  --    POTASSIUM  --   --  4.4      BP  Readings from Last 3 Encounters:   12/21/18 (!) 124/92   12/12/18 122/90   10/02/18 115/80    Wt Readings from Last 3 Encounters:   12/21/18 58.8 kg (129 lb 9.6 oz)   12/12/18 58.4 kg (128 lb 12.8 oz)   10/02/18 54.4 kg (120 lb)        ROS:  Constitutional, HEENT, cardiovascular, pulmonary, GI, , musculoskeletal, neuro, skin, endocrine and psych systems are negative, except as otherwise noted.    This document serves as a record of the services and decisions personally performed and made by Estefania Ortiz CNP. It was created on his/her behalf by Gaye Gaitan, trained medical scribe. The creation of this document is based the provider's statements to the medical scribes.    Mirthaibwaldo Gaitan 1:57 PM, December 21, 2018  OBJECTIVE:     BP (!) 124/92   Pulse 102   Temp 98.5  F (36.9  C) (Temporal)   Resp 16   Wt 58.8 kg (129 lb 9.6 oz)   SpO2 98%   BMI 27.09 kg/m    Body mass index is 27.09 kg/m .     GENERAL APPEARANCE: healthy, alert and no distress  EYES: Eyes grossly normal to inspection and conjunctivae and sclerae normal  HENT: ear canals and TM's normal, nose and mouth without ulcers or lesions and nasal mucosa edematous with mild clear rhinorrhea, turbinance edematous, some blood crusting on left nasal passage,   NECK: no adenopathy, no asymmetry, masses, or scars and thyroid normal to palpation  RESP: lungs clear to auscultation - no rales, rhonchi or wheezes  CV: regular rates and rhythm, normal S1 S2, no S3 or S4 and no murmur, click or rub    Diagnostic Test Results:  No results found for this or any previous visit (from the past 24 hour(s)).    ASSESSMENT/PLAN:       ICD-10-CM    1. Acute sinusitis with symptoms > 10 days J01.90 amoxicillin-clavulanate (AUGMENTIN) 875-125 MG tablet     DISCONTINUED: amoxicillin-clavulanate (AUGMENTIN) 875-125 MG tablet   2. Vaginal irritation N89.8 fluconazole (DIFLUCAN) 150 MG tablet   3. Elevated blood pressure reading without diagnosis of hypertension R03.0       Reviewed symptoms and etiology patient presents with today. Patient has previously been treated with Augmentin and finished her course today. Advised that the patient stop taking her Advil and sudafed as her blood pressure is elevated and instead taking Benadryl and Tylenol for pain relief and sleep. Advised not using her Flonase if she is having nose bleeds, but continue with her nasal saline washes. Discussed being cautions when taking Mucinex as it may be elevating her blood pressure as well.     Advised extending the course of Augmentin 875-125 mg and Diflucan 150 mg; Rx provided. Reviewed directions, benefits, and side effects of medications with patient today.     Follow up with PCP in 1-2 weeks for a blood pressure recheck or sooner if symptoms do not improve or worsen or PRN.    The information in this document, created by the medical scribe for me, accurately reflects the services I personally performed and the decisions made by me. I have reviewed and approved this document for accuracy prior to leaving the patient care area.  Estefania Ortiz CNP  1:57 PM, 12/21/18    MAKENNA Shelby CNP  [facility]

## 2018-12-21 NOTE — TELEPHONE ENCOUNTER
"I spoke with patient to relay the message below. She is concerned as she only has one day left of medication and is not any better. She will be completely off the antibiotics going into Saturday and there is no availability for an appointment tomorrow and then it is [place]. \"I'm going on four weeks of this and not improved at all\".     Pamella Win, RN, BSN     "

## 2018-12-21 NOTE — TELEPHONE ENCOUNTER
Still having facial pain, headache, congestion. Slight cough. Denies fevers. Discussed being reevaluated.   Next 5 appointments (look out 90 days)    Dec 21, 2018  1:40 PM CST  Office Visit with MAKENNA Henao CNP  [facility] ([facility]) [address], [address]  [place] [address]  [phone]      Cristiane Cota RN, BSN

## 2018-12-21 NOTE — TELEPHONE ENCOUNTER
Reason for Call:  Other call back and prescription    Detailed comments: [facility] called clinic looking for clarification on which Augmentin rx to refill. They received 2- one for 10 days, and another for 4 days. Please call back to say which is correct.     Phone Number: [phone]    Best Time: any    Can we leave a detailed message on this number? NO    Call taken on 12/21/2018 at 2:09 PM by Jessy Moseley

## 2019-01-03 ENCOUNTER — TELEPHONE (OUTPATIENT)
Dept: ORTHOPEDICS | Facility: CLINIC | Age: 64
End: 2019-01-03

## 2019-01-03 NOTE — TELEPHONE ENCOUNTER
Patient was called back.  She has had a partial knee replacement and does need antibiotics before dental cleanings.  They will be sent to her pharmacy by next week.  She would like them sent to the [facility] pharmacy in [place].  She was agreeable with this plan.

## 2019-01-03 NOTE — TELEPHONE ENCOUNTER
[facility] Call Center    Phone Message    May a detailed message be left on voicemail: yes    Reason for Call: Other: Upcoming Tooth appointment on Febuary 7, patient would like to know if this will affect her appointment, or possibility of surgery with Dr. Dai.  Will she need an antibiotic.     Action Taken: Message routed to:  [facility]

## 2019-01-07 DIAGNOSIS — Z96.652 STATUS POST LEFT KNEE REPLACEMENT: Primary | ICD-10-CM

## 2019-01-07 RX ORDER — AMOXICILLIN 500 MG/1
TABLET, FILM COATED ORAL
Qty: 4 TABLET | Refills: 3 | Status: SHIPPED | OUTPATIENT
Start: 2019-01-07 | End: 2019-02-21

## 2019-01-09 ENCOUNTER — TELEPHONE (OUTPATIENT)
Dept: FAMILY MEDICINE | Facility: OTHER | Age: 64
End: 2019-01-09

## 2019-01-09 NOTE — TELEPHONE ENCOUNTER
Received records from [facility].    Last PAP- 04/11/2014 with [id number] on 04/11/2014 which was negative.     Please abstract. Records sent to scanning     Ofelia Riggs

## 2019-01-23 ENCOUNTER — ALLIED HEALTH/NURSE VISIT (OUTPATIENT)
Dept: FAMILY MEDICINE | Facility: OTHER | Age: 64
End: 2019-01-23
Payer: COMMERCIAL

## 2019-01-23 VITALS — DIASTOLIC BLOOD PRESSURE: 88 MMHG | SYSTOLIC BLOOD PRESSURE: 118 MMHG | HEART RATE: 80 BPM

## 2019-01-23 DIAGNOSIS — I10 BENIGN ESSENTIAL HYPERTENSION: Primary | ICD-10-CM

## 2019-01-23 PROCEDURE — 99207 ZZC NO CHARGE NURSE ONLY: CPT

## 2019-01-23 NOTE — PROGRESS NOTES
Nisha Perez is a 63 year old patient who comes in today for a Blood Pressure check.  Initial BP:  /88 (BP Location: Left arm, Patient Position: Chair, Cuff Size: Adult Regular)   Pulse 80      Data Unavailable  Disposition: results routed to provider

## 2019-02-01 NOTE — TELEPHONE ENCOUNTER
RECORDS RECEIVED FROM: Right knee pain. Was seen at [facility]. had scope done by Dr. Pelayo. Left knee PFA on 7/21/2011 need records and imaging from [facility]   DATE RECEIVED: 02/01/19    NOTES STATUS DETAILS   OFFICE NOTE from referring provider Care Everywhere [facility]   OFFICE NOTE from other specialist N/A    DISCHARGE SUMMARY from hospital N/A    DISCHARGE REPORT from the ER N/A    OPERATIVE REPORT In process 7/21/11   MEDICATION LIST Care Everywhere    IMPLANT RECORD/STICKER In process 7/21/11   LABS     CBC/DIFF N/A    CULTURES N/A    INJECTIONS DONE IN RADIOLOGY In process    MRI N/A    CT SCAN N/A    XRAYS (IMAGES & REPORTS) Received 2011   TUMOR     PATHOLOGY  Slides & report N/A      02/01/19  3:29 PM [facility] ([facility]) requires patient auth for CE. Emailed pt auth form to sign at [email or web address] as discussed with patient on the phone.  3:40 PM spoke with Basilia in radiology at [facility], she will push images  3:44 PM faxed records request to [facility] just in case  02/04/19  9:42 AM Faxed 2nd request for records to [facility].   1:06 PM Gave authorization for CE to Mary. Plan to have pt sign release at arrival.

## 2019-02-04 DIAGNOSIS — M25.561 CHRONIC PAIN OF RIGHT KNEE: Primary | ICD-10-CM

## 2019-02-04 DIAGNOSIS — G89.29 CHRONIC PAIN OF RIGHT KNEE: Primary | ICD-10-CM

## 2019-02-05 ENCOUNTER — ANCILLARY PROCEDURE (OUTPATIENT)
Dept: GENERAL RADIOLOGY | Facility: CLINIC | Age: 64
End: 2019-02-05
Payer: COMMERCIAL

## 2019-02-05 ENCOUNTER — PRE VISIT (OUTPATIENT)
Dept: ORTHOPEDICS | Facility: CLINIC | Age: 64
End: 2019-02-05

## 2019-02-05 ENCOUNTER — OFFICE VISIT (OUTPATIENT)
Dept: ORTHOPEDICS | Facility: CLINIC | Age: 64
End: 2019-02-05
Payer: COMMERCIAL

## 2019-02-05 VITALS — WEIGHT: 126.6 LBS | BODY MASS INDEX: 27.31 KG/M2 | HEIGHT: 57 IN

## 2019-02-05 DIAGNOSIS — M25.561 CHRONIC PAIN OF RIGHT KNEE: ICD-10-CM

## 2019-02-05 DIAGNOSIS — G89.29 CHRONIC PAIN OF RIGHT KNEE: ICD-10-CM

## 2019-02-05 DIAGNOSIS — M17.11 ARTHRITIS OF RIGHT KNEE: Primary | ICD-10-CM

## 2019-02-05 ASSESSMENT — MIFFLIN-ST. JEOR: SCORE: 1003.13

## 2019-02-05 NOTE — PROGRESS NOTES
Chief complaint: Right knee pain    History of present illness: The patient is a 63-year-old female with past medical history including GERD and anxiety presenting for evaluation of right knee pain.  The patient is known to Dr. Dai for a previous left patellofemoral arthroplasty performed in .      In regards to her left knee, she has no complaints and is very happy with her outcome.  She denies any pain to the left knee.      In regards to her right knee, she reports increasing pain over the past year.  She denies any trauma prior to this onset.  Of note, she had a history of meniscus tear status post arthroscopic intervention by Dr. Pelayo shortly following her patellofemoral arthroplasty in .  She struggled with pain to the right knee for several years following this intervention but was able to manage this with injections, a brace, physical therapy and over-the-counter medications.  She notes previous corticosteroid injection and hyaluronic acid to the right knee without any relief.  She did not find her off  brace helpful either.  Over-the-counter medication and lifestyle modification were most beneficial to her and she actually did well for some time until this past year.  The pain is progressed to the point that she has difficulty sleeping.  She is avoiding activities she previously enjoyed including yard work, walking and shopping.  She is only able to stand for 10 minutes prior to onset of pain.  She points the medial aspect of the source of the pain.  No significant swelling.  She is not using an assistive device for ambulation.    Past medical history:  1.  GERD  2.  Anxiety  3.  Osteoporosis    Past surgical history:  1.  Tonsillectomy  2.   section  3.  Left knee arthroscopy x2  4.  Right knee arthroscopy  5.  Bilateral subtalar arthrodesis  6.  Appendectomy  7.  Left patellofemoral arthroplasty    Medications:  1.  Xanax as needed  2.  Zyrtec  3.  Dexilant  4.   "Gabapentin    Allergies: Latex    Family history: Patient denies family history of problems of bleeding, clotting or anesthesia.    Social history: Patient lives in [place] in a house with her .  She is retired.  She denies smoking.  She enjoys kayaking.    Review of systems: 10 point review of systems was performed and negative other than that in the HPI.    Physical exam:  Ht 1.448 m (4' 9\")   Wt 57.4 kg (126 lb 9.6 oz)   BMI 27.40 kg/m    General: No acute distress, pleasant, cooperative with exam.  HEENT: Normocephalic atraumatic.  Cardiac: Extremities warm well perfused.  Respiratory: Nonlabored breathing on room air.  Neuro: Moves all extremities spontaneously.   Psych: Appropriate affect.  Skin: No rashes or wounds noted on exposed skin.  MSK: Focused examination of the left lower extremity reveals well-healed previous midline incision.  There is no erythema.  There is no effusion.  She has full range of motion without pain.  Focused examination of the right lower extremity reveals no significant effusion or overlying skin changes.  Knee range of motion measured with a goniometer from 0-150 degrees of flexion.  She stable to varus and valgus stress at 0 and 30 degrees of flexion.  She does correct her varus deformity with stress.  Upon standing she does have a subtle varus deformity of the right knee.  She has an antalgic gait.  She is tender palpation of the medial joint line.    Imaging:    X-rays of the right knee and long length alignment films obtained today are reviewed and reveal evidence of well-seated components of a patellofemoral arthroplasty with early degenerative disease of the medial compartment.  She has a varus alignment to the right lower extremity with evidence of medial compartment joint space narrowing and osteophyte formation.    Assessment and plan: The patient is a 63-year-old female with past medical history including GERD, anxiety and osteoporosis presenting for evaluation " of right knee pain times 1 year found to have osteoarthritis.  We discussed the different treatment options with the patient who has previously exhausted these including corticosteroid injections.  We had a long discussion regarding the risks, benefits and alternatives to a total knee arthroplasty, including the typical postoperative course and how this would differ from her previous patellofemoral arthroplasty.  Given this is affecting her day-to-day life and she has previously exhausted nonoperative management, it is reasonable to pursue a total knee arthroplasty.  This will be scheduled at her convenience.  All questions were answered.    The patient was seen and discussed with Dr. Dai who is in agreement the above assessment and plan.    Room time: 30 min, Consultation time: 20 min, mostly spent discussing the diagnosis and surgical recommendation, including potential complications and time line for return to function.    Caroline Ribeiro MD  Orthopedic Surgery Resident, PGY-4    I have personally examined this patient and have reviewed the clinical presentation and progress note with the resident.  I agree with the treatment plan as outlined.  The plan was formulated with the resident on the day of the resident dictation.    Monie Dai

## 2019-02-05 NOTE — NURSING NOTE
"Reason For Visit:   Chief Complaint   Patient presents with     Consult For     Right knee pain, seen at [facility]       Primary MD: Olivia Kaur    ?  No  Currently working? No.  Work status?  Retired.  Type of injury: Torn meniscus while doing PT for her other knee.  Date of surgery: Patient is unsure of date - records at [facility].  Type of surgery: Meniscus repair - scope.  Smoker: No  Request smoking cessation information: No    Ht 1.448 m (4' 9\")   Wt 57.4 kg (126 lb 9.6 oz)   BMI 27.40 kg/m      Pain Assessment  Patient Currently in Pain: Yes  0-10 Pain Scale: 7  Primary Pain Location: Knee(Right)  Other Pain Locations: Can localize pain to one particular spot on the medial joint line area, but it sometimes radiates throughout her whole knee.  Pain Descriptors: Aching                                                   Day Cota ATC    "

## 2019-02-05 NOTE — NURSING NOTE
Teaching Flowsheet   Relevant Diagnosis: Right knee arthritis  Teaching Topic: Right TKA     Person(s) involved in teaching:   Patient     Motivation Level:  Asks Questions: Yes  Eager to Learn: Yes  Cooperative: Yes  Receptive (willing/able to accept information): Yes  Any cultural factors/Yazidi beliefs that may influence understanding or compliance? No       Patient demonstrates understanding of the following:  Reason for the appointment, diagnosis and treatment plan: Yes  Knowledge of proper use of medications and conditions for which they are ordered (with special attention to potential side effects or drug interactions): Yes  Which situations necessitate calling provider and whom to contact: Yes       Teaching Concerns Addressed:   Patient is aware that she will need to have and H&P completed with in 30 days before surgery.  She stated that her primary care provider is with in [place].       Nutritional needs and diet plan: Yes  Pain management techniques: Yes  Wound Care: Yes  How and/when to access community resources: Yes     Instructional Materials Used/Given: Verbal/preop packet     Time spent with patient: 10 min.

## 2019-02-05 NOTE — LETTER
2/5/2019       RE: Nisha Perez  [address] [place] [id number]     Dear Colleague,    Thank you for referring your patient, Nisha Perez, to [facility]. Please see a copy of my visit note below.    Chief complaint: Right knee pain    History of present illness: The patient is a 63-year-old female with past medical history including GERD and anxiety presenting for evaluation of right knee pain.  The patient is known to Dr. Dai for a previous left patellofemoral arthroplasty performed in 2011.      In regards to her left knee, she has no complaints and is very happy with her outcome.  She denies any pain to the left knee.      In regards to her right knee, she reports increasing pain over the past year.  She denies any trauma prior to this onset.  Of note, she had a history of meniscus tear status post arthroscopic intervention by Dr. Pelayo shortly following her patellofemoral arthroplasty in 2011.  She struggled with pain to the right knee for several years following this intervention but was able to manage this with injections, a brace, physical therapy and over-the-counter medications.  She notes previous corticosteroid injection and hyaluronic acid to the right knee without any relief.  She did not find her off  brace helpful either.  Over-the-counter medication and lifestyle modification were most beneficial to her and she actually did well for some time until this past year.  The pain is progressed to the point that she has difficulty sleeping.  She is avoiding activities she previously enjoyed including yard work, walking and shopping.  She is only able to stand for 10 minutes prior to onset of pain.  She points the medial aspect of the source of the pain.  No significant swelling.  She is not using an assistive device for ambulation.    Past medical history:  1.  GERD  2.  Anxiety  3.  Osteoporosis    Past surgical  "history:  1.  Tonsillectomy  2.   section  3.  Left knee arthroscopy x2  4.  Right knee arthroscopy  5.  Bilateral subtalar arthrodesis  6.  Appendectomy  7.  Left patellofemoral arthroplasty    Medications:  1.  Xanax as needed  2.  Zyrtec  3.  Dexilant  4.  Gabapentin    Allergies: Latex    Family history: Patient denies family history of problems of bleeding, clotting or anesthesia.    Social history: Patient lives in [place] in a house with her .  She is retired.  She denies smoking.  She enjoys kayaking.    Review of systems: 10 point review of systems was performed and negative other than that in the HPI.    Physical exam:  Ht 1.448 m (4' 9\")   Wt 57.4 kg (126 lb 9.6 oz)   BMI 27.40 kg/m     General: No acute distress, pleasant, cooperative with exam.  HEENT: Normocephalic atraumatic.  Cardiac: Extremities warm well perfused.  Respiratory: Nonlabored breathing on room air.  Neuro: Moves all extremities spontaneously.   Psych: Appropriate affect.  Skin: No rashes or wounds noted on exposed skin.  MSK: Focused examination of the left lower extremity reveals well-healed previous midline incision.  There is no erythema.  There is no effusion.  She has full range of motion without pain.  Focused examination of the right lower extremity reveals no significant effusion or overlying skin changes.  Knee range of motion measured with a goniometer from 0-150 degrees of flexion.  She stable to varus and valgus stress at 0 and 30 degrees of flexion.  She does correct her varus deformity with stress.  Upon standing she does have a subtle varus deformity of the right knee.  She has an antalgic gait.  She is tender palpation of the medial joint line.    Imaging:    X-rays of the right knee and long length alignment films obtained today are reviewed and reveal evidence of well-seated components of a patellofemoral arthroplasty with early degenerative disease of the medial compartment.  She has a varus " alignment to the right lower extremity with evidence of medial compartment joint space narrowing and osteophyte formation.    Assessment and plan: The patient is a 63-year-old female with past medical history including GERD, anxiety and osteoporosis presenting for evaluation of right knee pain times 1 year found to have osteoarthritis.  We discussed the different treatment options with the patient who has previously exhausted these including corticosteroid injections.  We had a long discussion regarding the risks, benefits and alternatives to a total knee arthroplasty, including the typical postoperative course and how this would differ from her previous patellofemoral arthroplasty.  Given this is affecting her day-to-day life and she has previously exhausted nonoperative management, it is reasonable to pursue a total knee arthroplasty.  This will be scheduled at her convenience.  All questions were answered.    The patient was seen and discussed with Dr. Dai who is in agreement the above assessment and plan.    Room time: 30 min, Consultation time: 20 min, mostly spent discussing the diagnosis and surgical recommendation, including potential complications and time line for return to function.    Caroline Ribeiro MD  Orthopedic Surgery Resident, PGY-4    I have personally examined this patient and have reviewed the clinical presentation and progress note with the resident.  I agree with the treatment plan as outlined.  The plan was formulated with the resident on the day of the resident dictation.    Monie Dai

## 2019-02-07 ENCOUNTER — DOCUMENTATION ONLY (OUTPATIENT)
Dept: ORTHOPEDICS | Facility: CLINIC | Age: 64
End: 2019-02-07

## 2019-02-07 NOTE — PROGRESS NOTES
Patient is scheduled for surgery with Dr. Dai      Spoke or left message with: Nisha    Date of Surgery: 3/28/19    Location: [facility]    Informed patient they will need an adult  Yes      H&P: Patient will schedule with PCP    Additional imaging/appointments: N/A    Surgery packet: Given in clinc    Additional comments: Patient will await pre admission phone call 1-2 days prior to surgery for arrival time

## 2019-02-11 NOTE — TELEPHONE ENCOUNTER
RECORDS RECEIVED FROM: Bilateral thumb pain. Ref. Dr. Dai   DATE RECEIVED: 02/11/19    NOTES STATUS DETAILS   OFFICE NOTE from referring provider Internal 2/5/19 Dr. Dai   OFFICE NOTE from other specialist N/A    DISCHARGE SUMMARY from hospital N/A    DISCHARGE REPORT from the ER N/A    OPERATIVE REPORT N/A    MEDICATION LIST Internal    IMPLANT RECORD/STICKER N/A    LABS     CBC/DIFF N/A    CULTURES N/A    INJECTIONS DONE IN RADIOLOGY N/A    MRI N/A    CT SCAN N/A    XRAYS (IMAGES & REPORTS) N/A    TUMOR     PATHOLOGY  Slides & report N/A

## 2019-02-14 DIAGNOSIS — M79.646 PAIN IN FINGER: Primary | ICD-10-CM

## 2019-02-15 ENCOUNTER — PRE VISIT (OUTPATIENT)
Dept: ORTHOPEDICS | Facility: CLINIC | Age: 64
End: 2019-02-15

## 2019-02-15 ENCOUNTER — OFFICE VISIT (OUTPATIENT)
Dept: ORTHOPEDICS | Facility: CLINIC | Age: 64
End: 2019-02-15
Payer: COMMERCIAL

## 2019-02-15 ENCOUNTER — ANCILLARY PROCEDURE (OUTPATIENT)
Dept: GENERAL RADIOLOGY | Facility: CLINIC | Age: 64
End: 2019-02-15
Attending: ORTHOPAEDIC SURGERY
Payer: COMMERCIAL

## 2019-02-15 VITALS — HEIGHT: 57 IN | WEIGHT: 127.4 LBS | BODY MASS INDEX: 27.49 KG/M2

## 2019-02-15 DIAGNOSIS — M18.0 PRIMARY OSTEOARTHRITIS OF BOTH FIRST CARPOMETACARPAL JOINTS: Primary | ICD-10-CM

## 2019-02-15 DIAGNOSIS — M79.646 PAIN IN FINGER: ICD-10-CM

## 2019-02-15 ASSESSMENT — ENCOUNTER SYMPTOMS
MUSCLE CRAMPS: 0
SKIN CHANGES: 0
POOR WOUND HEALING: 0
ARTHRALGIAS: 1
JOINT SWELLING: 0
MUSCLE WEAKNESS: 0
MYALGIAS: 0
STIFFNESS: 1
NECK PAIN: 0
HOT FLASHES: 1
NAIL CHANGES: 1
BACK PAIN: 0

## 2019-02-15 ASSESSMENT — MIFFLIN-ST. JEOR: SCORE: 1008.14

## 2019-02-15 NOTE — PROGRESS NOTES
[facility]  Orthopedics  Mehran Salgado MD  02/15/2019     Name: Nisha Perez  MRN: [id number]  Age: 63 year old  : 1955  Referring provider: Referred Self     Chief Complaint: bilateral thumb pain    History of Present Illness:   Nisha Perez is a 63 year old, right handed female who presents today for evaluation of bilateral thumb pain.  She reports she has had pain in bilateral thumbs for upwards of 8-10 years.  It started in her right thumb however is been progressively worsening bilaterally.  She has significant issues with pinching and fine motor tasks requiring her thumb.  She also notes some intermittent and mild numbness tingling in the fingers in the median nerve distribution.  She takes Advil as needed.  She has had no formal treatment however she has tried some over-the-counter bracing.  This is not been helpful.    Review of Systems:   A 14-point review of systems was obtained and is negative except for as noted in the HPI.     Medications:     Current Outpatient Medications:      ALPRAZolam (XANAX) 0.25 MG tablet, Take 1 tablet (0.25 mg) by mouth as needed for other (30 min before flying), Disp: 20 tablet, Rfl: 0     amoxicillin (AMOXIL) 500 MG tablet, Take 2 grams (4 tablets) one hour before dental procedures, Disp: 4 tablet, Rfl: 3     cetirizine (ZYRTEC) 10 MG tablet, Take 10 mg by mouth daily., Disp: , Rfl:      dexlansoprazole (DEXILANT) 60 MG CPDR CR capsule, Take 1 capsule (60 mg) by mouth daily, Disp: 90 capsule, Rfl: 3     diphenhydrAMINE (BENADRYL) 25 MG tablet, Take 25 mg by mouth every 6 hours as needed., Disp: , Rfl:      GABAPENTIN PO, Take 300 mg by mouth 3 times daily, Disp: , Rfl:      Pseudoephedrine HCl (SUDAFED PO), Take  by mouth at onset of headache., Disp: , Rfl:      zolpidem (AMBIEN) 5 MG tablet, , Disp: , Rfl: 0    Allergies:  Allergies   Allergen Reactions     Latex Itching     burning       Past Medical History:  Past Medical History:   Diagnosis Date      "Anxiety disorder      Gastro-oesophageal reflux disease     esophagitis     Injury of knee, left      Insomnia      Osteoarthritis      Osteoporosis        Past Surgical History:  Past Surgical History:   Procedure Laterality Date     APPENDECTOMY       ARTHRODESIS ANKLE      left     ARTHROPLASTY PATELLO-FEMORAL (KNEE)  2011    Procedure:ARTHROPLASTY PATELLO-FEMORAL (KNEE); Left Knee Latia Arthrolplasty Prosthesis, Removal of hardware left knee; Surgeon:TORI LLOYD; Location:[facility]     ARTHROSCOPY KNEE      left     ARTHROSCOPY KNEE      right      SECTION      X 4     TONSILLECTOMY          Social History:  Patient lives in [facility] with her .  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  She is a non-smoker.    Family History:  Negative for bleeding or clotting disorders or adverse reactions to anesthesia.    Physical Examination:  Height 1.45 m (4' 9.09\"), weight 57.8 kg (127 lb 6.4 oz), not currently breastfeeding.  GEN: well appearing, no distress  RESP: Non labored breathing  SKIN: dry, non-diaphoretic   LYMPHATIC:  no edema   NEURO:  alert and oriented    VASCULAR: Satisfactory perfusion of all extremities  MUSCULOSKELETAL: Focused examination of bilateral hands reveals tenderness over the scaphoid trapezial joint.  No significant deformity. minimal tenderness at the CMC joint.  Negative grind test.  Positive Tinel and flexion test of bilateral hands.  Neurovascularly intact otherwise.    Imaging:   X-rays of bilateral hands show significant CMC and ST joint arthritis.    I have independently reviewed the above imaging studies; the results were discussed with the patient.       Assessment:   63 year old, right handed female with bilateral thumb CMC and scaphotrapezial arthritis.  Mild bilateral carpal tunnel syndrome    Plan:   We reviewed the patient's diagnoses with her.  On imaging she has significant arthritis at her " CMC joint as well as ST joint.  Clinically, her symptoms are predominantly at the ST joint.  Either way, her treatment will be the same.  We discussed bracing, injections, and surgical intervention.  We recommended that she have custom made brace is made with occupational therapy in an attempt to stiffen up her joints and see if this will resolve her symptoms.  If this does not improve her symptoms she could further discuss surgical intervention which she is interested in.  We discussed trapeziectomy with carpal tunnel at the same time.  We discussed postoperative recovery which likely be 6 weeks in a splint.  We will set her up for occupational therapy appointment to have splints made and she will follow-up in 1 month to evaluate her progress.  Of note, she has knee replacement surgery in March and would like to coordinate some surgery during this recovery period.    Louie Cazares MD   Orthopedic Surgery; PGY-4    This patient was seen, examined and counseled by Dr. Salgado.    Patient was seen and examined with the resident.  I agree with the assessment and plan of care.

## 2019-02-15 NOTE — LETTER
2/15/2019     RE: Nisha Perez  [address] [place] [id number]     Dear Colleague,    Thank you for referring your patient, Nisha Perez, to the [facility] at [facility]. Please see a copy of my visit note below.    [facility]  Orthopedics  Mehran Salgado MD  02/15/2019     Name: Nisha Perez  MRN: [id number]  Age: 63 year old  : 1955  Referring provider: Referred Self     Chief Complaint: bilateral thumb pain    History of Present Illness:   Nisha Perez is a 63 year old, right handed female who presents today for evaluation of bilateral thumb pain.  She reports she has had pain in bilateral thumbs for upwards of 8-10 years.  It started in her right thumb however is been progressively worsening bilaterally.  She has significant issues with pinching and fine motor tasks requiring her thumb.  She also notes some intermittent and mild numbness tingling in the fingers in the median nerve distribution.  She takes Advil as needed.  She has had no formal treatment however she has tried some over-the-counter bracing.  This is not been helpful.    Review of Systems:   A 14-point review of systems was obtained and is negative except for as noted in the HPI.     Medications:     Current Outpatient Medications:      ALPRAZolam (XANAX) 0.25 MG tablet, Take 1 tablet (0.25 mg) by mouth as needed for other (30 min before flying), Disp: 20 tablet, Rfl: 0     amoxicillin (AMOXIL) 500 MG tablet, Take 2 grams (4 tablets) one hour before dental procedures, Disp: 4 tablet, Rfl: 3     cetirizine (ZYRTEC) 10 MG tablet, Take 10 mg by mouth daily., Disp: , Rfl:      dexlansoprazole (DEXILANT) 60 MG CPDR CR capsule, Take 1 capsule (60 mg) by mouth daily, Disp: 90 capsule, Rfl: 3     diphenhydrAMINE (BENADRYL) 25 MG tablet, Take 25 mg by mouth every 6 hours as needed., Disp: , Rfl:      GABAPENTIN PO, Take 300 mg by mouth 3 times daily, Disp: , Rfl:       "Pseudoephedrine HCl (SUDAFED PO), Take  by mouth at onset of headache., Disp: , Rfl:      zolpidem (AMBIEN) 5 MG tablet, , Disp: , Rfl: 0    Allergies:  Allergies   Allergen Reactions     Latex Itching     burning       Past Medical History:  Past Medical History:   Diagnosis Date     Anxiety disorder      Gastro-oesophageal reflux disease     esophagitis     Injury of knee, left      Insomnia      Osteoarthritis      Osteoporosis        Past Surgical History:  Past Surgical History:   Procedure Laterality Date     APPENDECTOMY       ARTHRODESIS ANKLE      left     ARTHROPLASTY PATELLO-FEMORAL (KNEE)  2011    Procedure:ARTHROPLASTY PATELLO-FEMORAL (KNEE); Left Knee [place] Arthrolplasty Prosthesis, Removal of hardware left knee; Surgeon:TORI LLOYD; [facility]     ARTHROSCOPY KNEE      left     ARTHROSCOPY KNEE      right      SECTION      X 4     TONSILLECTOMY          Social History:  Patient lives in [facility] with her .  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  She is a non-smoker.    Family History:  Negative for bleeding or clotting disorders or adverse reactions to anesthesia.    Physical Examination:  Height 1.45 m (4' 9.09\"), weight 57.8 kg (127 lb 6.4 oz), not currently breastfeeding.  GEN: well appearing, no distress  RESP: Non labored breathing  SKIN: dry, non-diaphoretic   LYMPHATIC:  no edema   NEURO:  alert and oriented    VASCULAR: Satisfactory perfusion of all extremities  MUSCULOSKELETAL: Focused examination of bilateral hands reveals tenderness over the scaphoid trapezial joint.  No significant deformity. minimal tenderness at the CMC joint.  Negative grind test.  Positive Tinel and flexion test of bilateral hands.  Neurovascularly intact otherwise.    Imaging:   X-rays of bilateral hands show significant CMC and ST joint arthritis.    I have independently reviewed the above imaging studies; the results " were discussed with the patient.       Assessment:   63 year old, right handed female with bilateral thumb CMC and scaphotrapezial arthritis.  Mild bilateral carpal tunnel syndrome    Plan:   We reviewed the patient's diagnoses with her.  On imaging she has significant arthritis at her CMC joint as well as ST joint.  Clinically, her symptoms are predominantly at the ST joint.  Either way, her treatment will be the same.  We discussed bracing, injections, and surgical intervention.  We recommended that she have custom made brace is made with occupational therapy in an attempt to stiffen up her joints and see if this will resolve her symptoms.  If this does not improve her symptoms she could further discuss surgical intervention which she is interested in.  We discussed trapeziectomy with carpal tunnel at the same time.  We discussed postoperative recovery which likely be 6 weeks in a splint.  We will set her up for occupational therapy appointment to have splints made and she will follow-up in 1 month to evaluate her progress.  Of note, she has knee replacement surgery in March and would like to coordinate some surgery during this recovery period.    Louie Cazares MD   Orthopedic Surgery; PGY-4    This patient was seen, examined and counseled by Dr. Salgado.      Again, thank you for allowing me to participate in the care of your patient.      Sincerely,    Mehran Salgado MD

## 2019-02-15 NOTE — NURSING NOTE
"Reason For Visit:   Chief Complaint   Patient presents with     Consult     Pain, bilateral thumb. Referred by: Dr. Dai.        Primary MD: Olivia Kaur    Age: 63 year old    ?  No      Height 1.45 m (4' 9.09\"), weight 57.8 kg (127 lb 6.4 oz)    Pain Assessment  Patient Currently in Pain: Yes  Primary Pain Location: (Bilateral thumb)  Pain Descriptors: Constant  Aggravating Factors: (Pinching)    Hand Dominance Evaluation  Hand Dominance: Right      Hx of surgery on left wrist at [facility] for a ganglion cyst removal.     QuickDASH Assessment  No flowsheet data found.       Current Outpatient Medications   Medication Sig Dispense Refill     ALPRAZolam (XANAX) 0.25 MG tablet Take 1 tablet (0.25 mg) by mouth as needed for other (30 min before flying) 20 tablet 0     amoxicillin (AMOXIL) 500 MG tablet Take 2 grams (4 tablets) one hour before dental procedures 4 tablet 3     cetirizine (ZYRTEC) 10 MG tablet Take 10 mg by mouth daily.       dexlansoprazole (DEXILANT) 60 MG CPDR CR capsule Take 1 capsule (60 mg) by mouth daily 90 capsule 3     diphenhydrAMINE (BENADRYL) 25 MG tablet Take 25 mg by mouth every 6 hours as needed.       GABAPENTIN PO Take 300 mg by mouth 3 times daily       Pseudoephedrine HCl (SUDAFED PO) Take  by mouth at onset of headache.       zolpidem (AMBIEN) 5 MG tablet   0       Allergies   Allergen Reactions     Latex Itching     burning       [address]    "

## 2019-02-15 NOTE — LETTER
RE: Nisha Perez  [address] [place] [id number]     Dear Colleague,    Thank you for referring your patient, Nisha Perez, to the [facility] at [facility]. Please see a copy of my visit note below.    [facility]  Orthopedics  Mehran Salgado MD  02/15/2019     Name: Nisha Perez  MRN: [id number]  Age: 63 year old  : 1955  Referring provider: Referred Self     Chief Complaint: bilateral thumb pain    History of Present Illness:   Nisha Perez is a 63 year old, right handed female who presents today for evaluation of bilateral thumb pain.  She reports she has had pain in bilateral thumbs for upwards of 8-10 years.  It started in her right thumb however is been progressively worsening bilaterally.  She has significant issues with pinching and fine motor tasks requiring her thumb.  She also notes some intermittent and mild numbness tingling in the fingers in the median nerve distribution.  She takes Advil as needed.  She has had no formal treatment however she has tried some over-the-counter bracing.  This is not been helpful.    Review of Systems:   A 14-point review of systems was obtained and is negative except for as noted in the HPI.     Medications:     Current Outpatient Medications:      ALPRAZolam (XANAX) 0.25 MG tablet, Take 1 tablet (0.25 mg) by mouth as needed for other (30 min before flying), Disp: 20 tablet, Rfl: 0     amoxicillin (AMOXIL) 500 MG tablet, Take 2 grams (4 tablets) one hour before dental procedures, Disp: 4 tablet, Rfl: 3     cetirizine (ZYRTEC) 10 MG tablet, Take 10 mg by mouth daily., Disp: , Rfl:      dexlansoprazole (DEXILANT) 60 MG CPDR CR capsule, Take 1 capsule (60 mg) by mouth daily, Disp: 90 capsule, Rfl: 3     diphenhydrAMINE (BENADRYL) 25 MG tablet, Take 25 mg by mouth every 6 hours as needed., Disp: , Rfl:      GABAPENTIN PO, Take 300 mg by mouth 3 times daily, Disp: , Rfl:      Pseudoephedrine HCl  "(SUDAFED PO), Take  by mouth at onset of headache., Disp: , Rfl:      zolpidem (AMBIEN) 5 MG tablet, , Disp: , Rfl: 0    Allergies:  Allergies   Allergen Reactions     Latex Itching     burning       Past Medical History:  Past Medical History:   Diagnosis Date     Anxiety disorder      Gastro-oesophageal reflux disease     esophagitis     Injury of knee, left      Insomnia      Osteoarthritis      Osteoporosis        Past Surgical History:  Past Surgical History:   Procedure Laterality Date     APPENDECTOMY       ARTHRODESIS ANKLE      left     ARTHROPLASTY PATELLO-FEMORAL (KNEE)  2011    Procedure:ARTHROPLASTY PATELLO-FEMORAL (KNEE); Left Knee [place] Arthrolplasty Prosthesis, Removal of hardware left knee; Surgeon:TORI LLOYD; [facility]     ARTHROSCOPY KNEE      left     ARTHROSCOPY KNEE      right      SECTION      X 4     TONSILLECTOMY          Social History:  Patient lives in [facility] with her .  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  She is a non-smoker.    Family History:  Negative for bleeding or clotting disorders or adverse reactions to anesthesia.    Physical Examination:  Height 1.45 m (4' 9.09\"), weight 57.8 kg (127 lb 6.4 oz), not currently breastfeeding.  GEN: well appearing, no distress  RESP: Non labored breathing  SKIN: dry, non-diaphoretic   LYMPHATIC:  no edema   NEURO:  alert and oriented    VASCULAR: Satisfactory perfusion of all extremities  MUSCULOSKELETAL: Focused examination of bilateral hands reveals tenderness over the scaphoid trapezial joint.  No significant deformity. minimal tenderness at the CMC joint.  Negative grind test.  Positive Tinel and flexion test of bilateral hands.  Neurovascularly intact otherwise.    Imaging:   X-rays of bilateral hands show significant CMC and ST joint arthritis.    I have independently reviewed the above imaging studies; the results were discussed with " the patient.       Assessment:   63 year old, right handed female with bilateral thumb CMC and scaphotrapezial arthritis.  Mild bilateral carpal tunnel syndrome    Plan:   We reviewed the patient's diagnoses with her.  On imaging she has significant arthritis at her CMC joint as well as ST joint.  Clinically, her symptoms are predominantly at the ST joint.  Either way, her treatment will be the same.  We discussed bracing, injections, and surgical intervention.  We recommended that she have custom made brace is made with occupational therapy in an attempt to stiffen up her joints and see if this will resolve her symptoms.  If this does not improve her symptoms she could further discuss surgical intervention which she is interested in.  We discussed trapeziectomy with carpal tunnel at the same time.  We discussed postoperative recovery which likely be 6 weeks in a splint.  We will set her up for occupational therapy appointment to have splints made and she will follow-up in 1 month to evaluate her progress.  Of note, she has knee replacement surgery in March and would like to coordinate some surgery during this recovery period.    Louie Cazares MD   Orthopedic Surgery; PGY-4    This patient was seen, examined and counseled by Dr. Salgado.    Answers for HPI/ROS submitted by the patient on 2/15/2019   SKELETAL SYMPTOMS: Yes  Changes in hair: No  Changes in moles/birth marks: No  Itching: No  Rashes: No  Changes in nails: Yes  Hair in places you don't want it: No  Change in facial hair: No  Warts: No  Non-healing sores: No  Scarring: No  Flaking of skin: No  Color changes of hands/feet in cold : No  Sun sensitivity: No  Skin thickening: No  Back pain: No  Muscle aches: No  Neck pain: No  Swollen joints: No  Joint pain: Yes  Bone pain: No  Muscle cramps: No  Muscle weakness: No  Joint stiffness: Yes  Bone fracture: No  Bleeding or spotting between periods: No  Heavy or painful periods: No  Irregular periods:  No  Vaginal discharge: No  Hot flashes: Yes  Vaginal dryness: Yes  Genital ulcers: No  Post-menopausal bleeding: No    Conflicting answers have been found for some questions. Please document the patient's answers manually. ***    Again, thank you for allowing me to participate in the care of your patient.      Sincerely,    Mehran Salgado MD

## 2019-02-21 ENCOUNTER — THERAPY VISIT (OUTPATIENT)
Dept: OCCUPATIONAL THERAPY | Facility: CLINIC | Age: 64
End: 2019-02-21
Attending: ORTHOPAEDIC SURGERY
Payer: COMMERCIAL

## 2019-02-21 ENCOUNTER — OFFICE VISIT (OUTPATIENT)
Dept: FAMILY MEDICINE | Facility: CLINIC | Age: 64
End: 2019-02-21
Payer: COMMERCIAL

## 2019-02-21 VITALS
BODY MASS INDEX: 27.61 KG/M2 | HEART RATE: 98 BPM | WEIGHT: 128 LBS | DIASTOLIC BLOOD PRESSURE: 90 MMHG | SYSTOLIC BLOOD PRESSURE: 126 MMHG | TEMPERATURE: 98.5 F | OXYGEN SATURATION: 97 %

## 2019-02-21 DIAGNOSIS — L03.032 PARONYCHIA OF TOE, LEFT: ICD-10-CM

## 2019-02-21 DIAGNOSIS — M79.644 BILATERAL THUMB PAIN: ICD-10-CM

## 2019-02-21 DIAGNOSIS — M79.645 BILATERAL THUMB PAIN: ICD-10-CM

## 2019-02-21 DIAGNOSIS — L60.0 INGROWN TOENAIL OF LEFT FOOT: Primary | ICD-10-CM

## 2019-02-21 DIAGNOSIS — M18.0 PRIMARY OSTEOARTHRITIS OF BOTH FIRST CARPOMETACARPAL JOINTS: ICD-10-CM

## 2019-02-21 PROCEDURE — G8984 CARRY CURRENT STATUS: HCPCS | Mod: GO | Performed by: OCCUPATIONAL THERAPIST

## 2019-02-21 PROCEDURE — 99213 OFFICE O/P EST LOW 20 MIN: CPT | Performed by: PHYSICIAN ASSISTANT

## 2019-02-21 PROCEDURE — 97165 OT EVAL LOW COMPLEX 30 MIN: CPT | Mod: GO | Performed by: OCCUPATIONAL THERAPIST

## 2019-02-21 PROCEDURE — G8985 CARRY GOAL STATUS: HCPCS | Mod: GO | Performed by: OCCUPATIONAL THERAPIST

## 2019-02-21 PROCEDURE — 97760 ORTHOTIC MGMT&TRAING 1ST ENC: CPT | Mod: GO | Performed by: OCCUPATIONAL THERAPIST

## 2019-02-21 RX ORDER — ZOLPIDEM TARTRATE 6.25 MG/1
6.25 TABLET, FILM COATED, EXTENDED RELEASE ORAL
COMMUNITY

## 2019-02-21 RX ORDER — SULFAMETHOXAZOLE/TRIMETHOPRIM 800-160 MG
1 TABLET ORAL 2 TIMES DAILY
Qty: 20 TABLET | Refills: 0 | Status: SHIPPED | OUTPATIENT
Start: 2019-02-21 | End: 2019-03-11

## 2019-02-21 RX ORDER — FLUTICASONE PROPIONATE 50 MCG
1 SPRAY, SUSPENSION (ML) NASAL 2 TIMES DAILY
COMMUNITY

## 2019-02-21 ASSESSMENT — PAIN SCALES - GENERAL: PAINLEVEL: MILD PAIN (3)

## 2019-02-21 NOTE — PATIENT INSTRUCTIONS
Patient Education     Understanding Ingrown Toenails    An ingrown nail is the result of a nail growing into the skin that surrounds it. This often occurs at either edge of the big toe. Ingrown nails may be caused by improper trimming, inherited nail deformities, injuries, fungal infections, or pressure.  Symptoms  Ingrown nails may cause pain at the tip of the toe or all the way to the base of the toe. The pain is often worse while walking. An ingrown nail may also lead to infection, inflammation, or a more serious condition. If it s infected, you might see pus or redness.  Evaluation  To determine the extent of your problem, your healthcare provider examines and possibly presses the painful area. If other problems are suspected, blood tests, cultures, and X-rays may be done as well.  Treatment  If the nail isn t infected, your healthcare provider may trim the corner of it to help relieve your symptoms. He or she may need to remove one side of your nail back to the cuticle. The base of the nail may then be treated with a chemical to keep the ingrown part from growing back. Severe infections or ingrown nails may require antibiotics and temporary or permanent removal of a portion of the nail. To prevent pain, a local anesthetic may be used in these procedures. This treatment is usually done at your healthcare provider's office.  Prevention  Many nail problems can be prevented by wearing the right shoes and trimming your nails properly. To help avoid infection, keep your feet clean and dry. If you have diabetes, talk with your healthcare provider before doing any foot self-care.    The right shoes: Get your feet measured (your size may change as you age). Wear shoes that are supportive and roomy enough for your toes to wiggle. Look for shoes made of natural materials such as leather, which allow your feet to breathe.    Proper trimming: To avoid problems, trim your toenails straight across without cutting down into  the corners. If you can t trim your own nails, ask your healthcare provider to do so for you.  Date Last Reviewed: 10/1/2016    3989-1268 [employer]. [address] [place] [address], [place] [address]. All rights reserved. This information is not intended as a substitute for professional medical care. Always follow your healthcare professional's instructions.

## 2019-02-21 NOTE — PROGRESS NOTES
"  SUBJECTIVE:   Nisha Perez is a 63 year old female who presents to clinic today for the following health issues:      History of Present Illness     Diet:  Gluten-free/reduced  Frequency of exercise:  4-5 days/week  Duration of exercise:  Less than 15 minutes  Taking medications regularly:  No  Barriers to taking medications:  None  Medication side effects:  None    Concern - ingrow toe nail with redness  Onset: 1 month or more. Pt states that about 1 year ago \"a bench fell on first knuckle of my toe\"    This fall started to have trouble w/redness and tenderness inside of toe nail. Soaked in epsom salts and it got better.   Has been waxing and waning since- alternates which edges of toe nail.   Most recently opposite nail edge red for more than 1 month. Thinks starting to become \"ingrown\". Has been digging at it and getting cotton under the corner. Gets better a few days and then worse again.  Had been on augmentin for awhile when it was an issue and that did not improve the sx at all.   Having knee replacement 03/20/19 and wants to be sure it clears up.   No fevers, chills. No red streaks. No pus that has drained.   Pt does not have hx of gout or diabetes or immune suppression.    Description:   Left big toe, tender, redness around the nail     Intensity: mild     Progression of Symptoms:  same    Accompanying Signs & Symptoms:  None     Previous history of similar problem:   None     Precipitating factors:   Worsened by: walking, sock on    Alleviating factors:  Improved by: none    Therapies Tried and outcome: soak epsom salt and warm water, pick on it,      Problem list and histories reviewed & adjusted, as indicated.  Additional history: as documented      Patient Active Problem List   Diagnosis     iamJOINT DERANGEMENT NEC-L/LEG     Subluxation of patella, acquired     Aftercare following joint replacement     Knee joint replacement by other means     Abnormal gait     Anxiety disorder     Advanced " directives, counseling/discussion     Phobia, flying     Gastroesophageal reflux disease without esophagitis     Bilateral thumb pain     Primary osteoarthritis of both first carpometacarpal joints     Past Surgical History:   Procedure Laterality Date     APPENDECTOMY       ARTHRODESIS ANKLE      left     ARTHROPLASTY PATELLO-FEMORAL (KNEE)  2011    Procedure:ARTHROPLASTY PATELLO-FEMORAL (KNEE); Left Knee [place] Arthrolplasty Prosthesis, Removal of hardware left knee; Surgeon:TORI LLOYD; [facility]     ARTHROSCOPY KNEE      left     ARTHROSCOPY KNEE      right      SECTION      X 4     TONSILLECTOMY         Social History     Tobacco Use     Smoking status: Never Smoker     Smokeless tobacco: Never Used   Substance Use Topics     Alcohol use: Yes     Comment: 4-6 drinks per week     Family History   Problem Relation Age of Onset     Colon Cancer Mother      Diabetes No family hx of      Coronary Artery Disease No family hx of      Hypertension No family hx of      Hyperlipidemia No family hx of      Cerebrovascular Disease No family hx of      Breast Cancer No family hx of      Anxiety Disorder No family hx of      Osteoporosis No family hx of      Obesity No family hx of      Depression No family hx of      Prostate Cancer No family hx of      Other Cancer No family hx of      Mental Illness No family hx of      Substance Abuse No family hx of      Asthma No family hx of      Genetic Disorder No family hx of      Thyroid Disease No family hx of      Anesthesia Reaction No family hx of          Current Outpatient Medications   Medication Sig Dispense Refill     dexlansoprazole (DEXILANT) 60 MG CPDR CR capsule Take 1 capsule (60 mg) by mouth daily 90 capsule 3     diphenhydrAMINE (BENADRYL) 25 MG tablet Take 25 mg by mouth every 6 hours as needed.       fluticasone (FLONASE) 50 MCG/ACT nasal spray Spray 1 spray into both nostrils 2 times daily       Pseudoephedrine HCl (SUDAFED PO) Take  by  mouth at onset of headache.       sulfamethoxazole-trimethoprim (BACTRIM DS/SEPTRA DS) 800-160 MG tablet Take 1 tablet by mouth 2 times daily 20 tablet 0     zolpidem ER (AMBIEN CR) 6.25 MG CR tablet Take 6.25 mg by mouth nightly as needed for sleep       ALPRAZolam (XANAX) 0.25 MG tablet Take 1 tablet (0.25 mg) by mouth as needed for other (30 min before flying) (Patient not taking: Reported on 2/21/2019) 20 tablet 0     Allergies   Allergen Reactions     Latex Itching     burning     Latex      PN: Converted from LW Latex Sensitivity Flag     BP Readings from Last 3 Encounters:   02/21/19 126/90   01/23/19 118/88   12/21/18 (!) 124/92    Wt Readings from Last 3 Encounters:   02/21/19 58.1 kg (128 lb)   02/15/19 57.8 kg (127 lb 6.4 oz)   02/05/19 57.4 kg (126 lb 9.6 oz)                  Labs reviewed in [facility]    ROS:  As above    OBJECTIVE:     /90   Pulse 98   Temp 98.5  F (36.9  C) (Oral)   Wt 58.1 kg (128 lb)   SpO2 97%   BMI 27.61 kg/m    Body mass index is 27.61 kg/m .  GENERAL: healthy, alert and no distress  MS: Foot:LEFT : Great toe: outside nail fold with mild erythema, tenderness, swelling. No pus pocket/absecess. Nail edge irregular and curving inward.    No pain at 1st MTP. Or MT heads.   ROM: Normal ROM of toes.  Circ: cap refill normal.   Sensation intact to light touch and symmetric.    Diagnostic Test Results:  none     ASSESSMENT/PLAN:     1. Paronychia of toe, left  Pt had been on augmentin for sinuses without any improvement in paronychia sx.   Discussed alternative abx. Start bactrim as below  Continue home cares- soaking, elevating nail edge. Avoid digging into tissue under the nail.  Nail cares- squaring nail discussed  Ref to podiatry if not improving.  - PODIATRY/FOOT & ANKLE SURGERY REFERRAL  - sulfamethoxazole-trimethoprim (BACTRIM DS/SEPTRA DS) 800-160 MG tablet; Take 1 tablet by mouth 2 times daily  Dispense: 20 tablet; Refill: 0    2. Ingrown toenail of left foot  -  PODIATRY/FOOT & ANKLE SURGERY REFERRAL    Pt requests pre-op scheduled w/Nikolai Spear today.     Follow Up: The patient was instructed to contact clinic for worsening symptoms, non-improvement as expected/discussed, and for questions regarding medications or treatment plan. Discussed parameters for follow up and included in After Visit Summary given to patient.      Pamella Rodriguez PA-C  [facility]

## 2019-02-21 NOTE — PROGRESS NOTES
Hand Therapy Initial Evaluation    Current Date:  2019  Referring Physician: Dr. Salgado    Subjective:  Nisha Perez is a 63 year old right hand dominant female.    Diagnosis:Bilateral thumb arthritis. Right is greater than left  DOI:  2/15/19 (Date of order)    Patient reports symptoms of pain, weakness/loss of strength, numbness and tingling  of the both thumbs which occurred due to DJD. Since onset symptoms are gradually getting worse.  Special tests:  x-rays.  Previous treatment: OTC braces with no benefit.    General health as reported by patient is good.  Pertinent medical history includes:Menopausal, Numbness/Tingling, Osteoarthritis, Osteoporosis  Medical allergies:none.  Surgical history: orthopedic: several.  Medication history: Anti-inflammatory, Dexilant, acid reflux.    Occupational Profile Information:  Current occupation is retired  Barriers include:none  Prior functional level:  no limitations  Mobility: No difficulty  Transportation: drives  Leisure activities/hobbies: baby leather shoes,  Custom , gardening, grandchildren    Upper Extremity Functional Index Score:  SCORE:   Column Totals: /80: 32   (A lower score indicates greater disability.)    Objective:  Pain:    VAS(0-10) 19   At Rest:  2-3/10   With Use:  5-7/10   At Worst  7/10     Report of Pain:  Location: bilateral thumb   Description: Ache, Sharp  Frequency: Constant with use  Duration:  Worse during the day  Exacerbated by: Lifting, gripping, twisting, pinching, pulling, activity  Relieved by: heat, rest, NSAIDs  Progression: gradually worsening    ROM:  Thumb 2019   AROM(PROM) Right Left   MP 65 60   IP 80 75   RAbd 37 35   PAbd 30 35   Retropulsion     Kapandji Opposition Scale (0-10/10) 10/10 10/10       STRENGTH:   (Measured in pounds)  Pain Free /Pinch Test only   2019      Trials Right Left Right Left Right Left  Right Left   1  2  3  Av++ 38++             Lateral Pinch  2019      Trials Right Left Right Left Right Left  Right Left   1  2  3  Av++ 10++           3 pt. Pinch 2019      Trials Right Left Right Left Right Left  Right Left   1  2  3  Avg: 10+ 10+           Provocative Tests:  Pain Report:  - none    + mild    ++ moderate    +++ severe    19      Right Left     CMC Grind test + +     CMC Joint line/pain ++ +     Finkelstein's - -     Crepitus present + +         Thumb Observation/Appearance:  Key: + = present/ - = not observed   Date: 2019     Right Left   Shoulder deformity present over CMC/Dorsalradial subluxation + -   Volar subluxation present   - -   Edema over the CMC joint   + -   Noted collapse of MP into hyperextension during pinch +   +   Tenderness at CMC   ++ +     Assessment:  Patient presents with symptoms consistent with diagnosis of CMC thumb arthritis, with conservativeintervention.    Patient s limitations or Problem List includes:  Pain,  weakness, decreased stability of the CMC joint, decreased  and pinch strength of the thumb which interferes with patients ability to perform  dressing, home maintenance and sports/recreational as compared to previous level of function.    Rehab Potential:  Good - Return to full activity, some limitations    Patient will benefit from skilled Occupational Therapy to increase stability of thumb and decrease pain to return to previous activity level and resume normal daily tasks and to reach their rehab potential.    Barriers to Learning:  No barrier    Communication Issues:  Patient appears to be able to clearly communicate and understand verbal and written communication and follow directions correctly.    Chart Review: Brief history including review of medical and/or therapy records relating to the presenting problem and Simple history review with patient    Identified Performance Deficits: dressing, home establishment and management, meal preparation and cleanup and leisure  activities    Assessment of Occupational Performance:  3-5 Performance Deficits    Clinical Decision Making (Complexity): Low complexity    Treatment Explanation:  The following has been discussed with the patient:  RX ordered/plan of care  Anticipated outcomes  Possible risks and side effects    Treatment Plan:    Frequency:  1 X week, once daily  Duration:  for 6 visits    Modalities:  Paraffin  Therapeutic Exercise:  Stabilization  Manual Techniques:  Myofascial release  Orthotic Fabrication:  Static orthosis and Forearm based orthosis  Education/Joint Protection: anatomy of CMC , joint protection principles, adaptive equipment as needed.    Discharge Plan:  Achieve all LTG  Kleberg in home treatment program.  Reach maximal therapeutic benefit.  Please refer to the daily flowsheet for treatment today and total treatment time.    Home Program:  Orthotic Support:  Bilateral Hand/Forearm Based Thumb Spica Orthosis,   Activity: incorporate joint protection into daily functional activities, adaptive equipment as needed.  Exercise:   Stabilization Program 3 times a day  CMC Thumb Stabilization Home Exercises:   X = date added to HEP  DATE      Web space release    C position    1st DI    Piano isometric    Skull Rock/Jog    Self CMC Mobs: Traction/chest/etc.      Next visit:   Check orthoses and adjust as indicated  Education on joint protection principles, adaptive equipment   Thumb stabilization exercises

## 2019-02-26 ENCOUNTER — TELEPHONE (OUTPATIENT)
Dept: FAMILY MEDICINE | Facility: CLINIC | Age: 64
End: 2019-02-26

## 2019-02-26 NOTE — TELEPHONE ENCOUNTER
I spoke with the pt who states the bactrim is making he nauseated. She is taking it with food. She sees the podiatrist tomorrow. She will discuss with them. Will also huddle with KP in the morning.    Es Dominguez, RN, BSN

## 2019-02-26 NOTE — TELEPHONE ENCOUNTER
Reason for call:  Patient reporting a symptom    Symptom or request: nausea    Duration (how long have symptoms been present): yesterday    Have you been treated for this before? Yes    Additional comments: Patient was seen for infection in toe, the medication you gave her is causing nausea she thinks.  The medication is really helping the infection in her toe.  She is wondering what she should do.  Please call    Phone Number patient can be reached at:  Home number on file [phone] (home)    Best Time:  any    Can we leave a detailed message on this number:  YES    Call taken on 2/26/2019 at 4:54 PM by Pretty Larson

## 2019-02-27 ENCOUNTER — OFFICE VISIT (OUTPATIENT)
Dept: PODIATRY | Facility: OTHER | Age: 64
End: 2019-02-27
Attending: PHYSICIAN ASSISTANT
Payer: COMMERCIAL

## 2019-02-27 VITALS
SYSTOLIC BLOOD PRESSURE: 128 MMHG | BODY MASS INDEX: 27.61 KG/M2 | DIASTOLIC BLOOD PRESSURE: 86 MMHG | WEIGHT: 128 LBS | HEIGHT: 57 IN | TEMPERATURE: 97.7 F

## 2019-02-27 DIAGNOSIS — L60.0 INGROWING NAIL: Primary | ICD-10-CM

## 2019-02-27 PROCEDURE — 99243 OFF/OP CNSLTJ NEW/EST LOW 30: CPT | Performed by: PODIATRIST

## 2019-02-27 ASSESSMENT — MIFFLIN-ST. JEOR: SCORE: 1010.86

## 2019-02-27 ASSESSMENT — PAIN SCALES - GENERAL: PAINLEVEL: NO PAIN (0)

## 2019-02-27 NOTE — LETTER
2/27/2019         RE: Nisha Perez  [address] [place] [id number]        Dear Colleague,    Thank you for referring your patient, Nisha Perez, to the [facility]. Please see a copy of my visit note below.    HPI:  Nisha Perez is a 63 year old female who is seen in consultation at the request of Pamella Rodriguez PA-C    Pt presents for eval of:   (Onset, Location, L/R, Character, Treatments, Injury if yes)    DOS 3/28/2019 - Right Total Knee Arthroplasty - Monie Dai MD     Onset March 2018, bench fell on Left great toe and damaged toenail. Presents today with Ingrown medial Left great toenail.  Intermittent, redness, sharp, stabbing, throbbing, swelling, pain 10 w/pressure  Soaking and abx    Retired.      Weight management plan: Patient was referred to their PCP to discuss a diet and exercise plan.     Patient to follow up with Primary Care provider regarding elevated blood pressure.    ROS:  10 point ROS neg other than the symptoms noted above in the HPI.    Patient Active Problem List   Diagnosis     iamJOINT DERANGEMENT NEC-L/LEG     Subluxation of patella, acquired     Aftercare following joint replacement     Knee joint replacement by other means     Abnormal gait     Anxiety disorder     Advanced directives, counseling/discussion     Phobia, flying     Gastroesophageal reflux disease without esophagitis     Bilateral thumb pain     Primary osteoarthritis of both first carpometacarpal joints       PAST MEDICAL HISTORY:   Past Medical History:   Diagnosis Date     Anxiety disorder      Gastro-oesophageal reflux disease     esophagitis     Injury of knee, left      Insomnia      Osteoarthritis      Osteoporosis         PAST SURGICAL HISTORY:   Past Surgical History:   Procedure Laterality Date     APPENDECTOMY       ARTHRODESIS ANKLE      left     ARTHROPLASTY PATELLO-FEMORAL (KNEE)  7/21/2011    Procedure:ARTHROPLASTY PATELLO-FEMORAL (KNEE); Left Knee [place]  Arthrolplasty Prosthesis, Removal of hardware left knee; Surgeon:TORI LLOYD; Location:[facility]     ARTHROSCOPY KNEE      left     ARTHROSCOPY KNEE      right      SECTION      X 4     TONSILLECTOMY          MEDICATIONS:   Current Outpatient Medications:      dexlansoprazole (DEXILANT) 60 MG CPDR CR capsule, Take 1 capsule (60 mg) by mouth daily, Disp: 90 capsule, Rfl: 3     fluticasone (FLONASE) 50 MCG/ACT nasal spray, Spray 1 spray into both nostrils 2 times daily, Disp: , Rfl:      sulfamethoxazole-trimethoprim (BACTRIM DS/SEPTRA DS) 800-160 MG tablet, Take 1 tablet by mouth 2 times daily, Disp: 20 tablet, Rfl: 0     diphenhydrAMINE (BENADRYL) 25 MG tablet, Take 25 mg by mouth every 6 hours as needed., Disp: , Rfl:      Pseudoephedrine HCl (SUDAFED PO), Take  by mouth at onset of headache., Disp: , Rfl:      zolpidem ER (AMBIEN CR) 6.25 MG CR tablet, Take 6.25 mg by mouth nightly as needed for sleep, Disp: , Rfl:      ALLERGIES:    Allergies   Allergen Reactions     Latex Itching     burning     Latex      PN: Converted from LW Latex Sensitivity Flag        SOCIAL HISTORY:   Social History     Socioeconomic History     Marital status:      Spouse name: Not on file     Number of children: Not on file     Years of education: Not on file     Highest education level: Not on file   Occupational History     Not on file   Social Needs     Financial resource strain: Not on file     Food insecurity:     Worry: Not on file     Inability: Not on file     Transportation needs:     Medical: Not on file     Non-medical: Not on file   Tobacco Use     Smoking status: Never Smoker     Smokeless tobacco: Never Used   Substance and Sexual Activity     Alcohol use: Yes     Comment: 4-6 drinks per week     Drug use: No     Sexual activity: Not Currently     Partners: Male   Lifestyle     Physical activity:     Days per week: Not on file     Minutes per session: Not on file     Stress: Not on file   Relationships  "    Social connections:     Talks on phone: Not on file     Gets together: Not on file     Attends Congregation service: Not on file     Active member of club or organization: Not on file     Attends meetings of clubs or organizations: Not on file     Relationship status: Not on file     Intimate partner violence:     Fear of current or ex partner: Not on file     Emotionally abused: Not on file     Physically abused: Not on file     Forced sexual activity: Not on file   Other Topics Concern     Parent/sibling w/ CABG, MI or angioplasty before 65F 55M? Not Asked   Social History Narrative     Not on file        FAMILY HISTORY:   Family History   Problem Relation Age of Onset     Colon Cancer Mother      Diabetes No family hx of      Coronary Artery Disease No family hx of      Hypertension No family hx of      Hyperlipidemia No family hx of      Cerebrovascular Disease No family hx of      Breast Cancer No family hx of      Anxiety Disorder No family hx of      Osteoporosis No family hx of      Obesity No family hx of      Depression No family hx of      Prostate Cancer No family hx of      Other Cancer No family hx of      Mental Illness No family hx of      Substance Abuse No family hx of      Asthma No family hx of      Genetic Disorder No family hx of      Thyroid Disease No family hx of      Anesthesia Reaction No family hx of         EXAM:Vitals: /86 (BP Location: Left arm, Cuff Size: Adult Regular)   Temp 97.7  F (36.5  C) (Temporal)   Ht 1.45 m (4' 9.09\")   Wt 58.1 kg (128 lb)   BMI 27.61 kg/m     BMI= Body mass index is 27.61 kg/m .    General appearance: Patient is alert and fully cooperative with history & exam.  No sign of distress is noted during the visit.     Psychiatric: Affect is pleasant & appropriate.  Patient appears motivated to improve health.     Respiratory: Breathing is regular & unlabored while sitting.     HEENT: Hearing is intact to spoken word.  Speech is clear.  No gross evidence " of visual impairment that would impact ambulation.     Vascular: DP & PT pulses are intact & regular bilaterally.  No significant edema or varicosities noted.  CFT and skin temperature is normal to both lower extremities.     Neurologic: Lower extremity sensation is intact to light touch.  No evidence of weakness or contracture in the lower extremities.  No evidence of neuropathy.    Dermatologic: Skin is intact to both lower extremities with adequate texture, turgor and tone about the integument.  Medial border of left great toe in ingrown without drainage. The nail is dystrophic on medial border. Central and lateral nail is in normal repair.     Musculoskeletal: Patient is ambulatory without assistive device or brace.  No gross ankle deformity noted.  No foot or ankle joint effusion is noted.       ASSESSMENT:       ICD-10-CM    1. Ingrowing nail L60.0         PLAN:  Reviewed patient[facility] in [facility].      2/27/2019   Minimal debridement occurred today to provide relief.  If this does not provide long-term relief would recommend matrixectomy of the left medial hallux nail.  She notes that she is discontinued the Bactrim because it was causing diarrhea and vomiting.  She was instructed to return to regular diet and probiotics.  If the diarrhea or vomiting persisted over the next 48 hours she was instructed to follow-up with PCP.   All questions were answered follow-up as needed.  She will be having total knee implant arthroplasty over the next few weeks and if she calls with continued or repeated drainage would recommend follow-up immediately      Bakari Montgomery DPM      Again, thank you for allowing me to participate in the care of your patient.        Sincerely,        Bakari Montgomery DPM

## 2019-02-27 NOTE — PATIENT INSTRUCTIONS
"Nail Debridement    A high quality instrument makes trimming toenails MUCH easier.  Search ebay for any 5\" nail nipper manufactured by reliable brands such as Miltex, Integra or Jarit as these quality instruments will help manage difficult nails more effectively and comfortably. We use Miltex -SS.  A physician is not necessary to trim nails even if you are taking blood thinners or are diabetic.  Your family or care givers may help manage your toenails.      Trim or sand the nails once weekly.  Do not wait until they are long and painful or trimming will become too difficult and painful and will increase your risk of complications or infection.  A course file or 120 grit sandpaper on a sanding block can be helpful.  For very thick nails many people prefer battery operated garibay such as an Amope', Personal Pedi and Emjoi for regular use or heavy painful callouses or thick toenails.    Trim or skive any portion of nail that is thick, loose, crumbling, or not well attached. Do not tear the nail away, but rather cut them with a nail nipperor sand or sand them down.  You may follow up with your Podiatric Physician if you have pain, bleeding, infection, questions or other concerns.      You may also contact the following Registered Nurses for further help with nail debridement and minor hygiene concnerns.  They may come to your home or meet them at their clinic to trim your toenails and soak your feet, as well as monitor for any complications that would require evaluation by a Physician.        [facility]  Debbie Osman RN  Office [phone]    [facility]  Coni Whalen RN  [phone]   Call or text for appointment  Some home visits and has a clinic at:  [address] [place] [id number]    [facility]  [phone]  [facility]  [phone]  [email or web address]  [facility]    For up to date list and " to find foot care nurses in other communities visit [employer] Association website:  afcna.org.         Calluses, Corns, IPKs, Porokeratosis    When there is excessive friction or pressure on the skin, the body responds by making the skin thicker.  While this may protect the deeper structures, the thickened skin can take up more space and thus increase pressure over a bony prominence or become an open sore or skin ulcer as this skin becomes less flexible.    Flat, diffuse thickening are simple calluses and they are usually caused by friction.  Often these are the result of rubbing on a shoe or going barefoot.    Calluses with a central core between the toes are called corns.  These often result from prominent joints on adjacent toes rubbing together.  Theses are often a symptom of bone malalignment and will usually recur unless the underlying bones are addressed.    Many of these lesions can be kept comfortable with routine maintenance. This consists of filing them with a Ped Egg, callus file, or 120 grit sandpaper on a block, every day during your bath or shower.  Most people prefer battery operated garibay such as an Amope', Personal Pedi and Emjoi for regular use or heavy painful callouses.  Heavy creams or ointments can be applied 1-2 times every day to keep them soft. Toe spacers can be used for corns, gel pads can be used for other lesions on the bottom of the foot. If there is a deformity noted, such as a prominent bone, often this can be addressed to minimize recurrence. However, sometimes the pressure and lesion simply migrates to another spot after surgery, so it is not a guaranteed cure.     If you have severe callouses and cracking, you may apply heavy ointments that you scoop up such as Cetaphil cream, Eucerin, Aquaphor or Vaseline.  Be sure to obtain cream or ointment in these brands and not lotion (lotion is water based and not durable enough for feet). For more aggressive help apply  heavy creams or ointment under occlusive dressings such as Saran Wrap or Jelly Feet while sleeping.   Jelly Feet can be obtained at [email or web address].     To be successful with managing hyperkeratotic skin, you must manage hygiene daily.  Apply the cream once or twice EVERY day.  At your bath or shower time is the easiest time to work on this when skin is most soft.  There is no medical or surgical treatment that will absolutely eliminate many of these symptoms.      Pedifix is a reliable source for all sorts of foot pads, cushions, or interdigital spacers and foot appliances. Go to www.IntegralReach.dot429 or request a catalog at 5-887-SoundTag.        Please call with any additional questions.

## 2019-02-27 NOTE — PROGRESS NOTES
HPI:  Nisha Perez is a 63 year old female who is seen in consultation at the request of Pamella Rodriguez PA-C    Pt presents for eval of:   (Onset, Location, L/R, Character, Treatments, Injury if yes)    DOS 3/28/2019 - Right Total Knee Arthroplasty - Monie Lloyd MD     Onset 2018, bench fell on Left great toe and damaged toenail. Presents today with Ingrown medial Left great toenail.  Intermittent, redness, sharp, stabbing, throbbing, swelling, pain 10 w/pressure  Soaking and abx    Retired.      Weight management plan: Patient was referred to their PCP to discuss a diet and exercise plan.     Patient to follow up with Primary Care provider regarding elevated blood pressure.    ROS:  10 point ROS neg other than the symptoms noted above in the HPI.    Patient Active Problem List   Diagnosis     iamJOINT DERANGEMENT NEC-L/LEG     Subluxation of patella, acquired     Aftercare following joint replacement     Knee joint replacement by other means     Abnormal gait     Anxiety disorder     Advanced directives, counseling/discussion     Phobia, flying     Gastroesophageal reflux disease without esophagitis     Bilateral thumb pain     Primary osteoarthritis of both first carpometacarpal joints       PAST MEDICAL HISTORY:   Past Medical History:   Diagnosis Date     Anxiety disorder      Gastro-oesophageal reflux disease     esophagitis     Injury of knee, left      Insomnia      Osteoarthritis      Osteoporosis         PAST SURGICAL HISTORY:   Past Surgical History:   Procedure Laterality Date     APPENDECTOMY       ARTHRODESIS ANKLE      left     ARTHROPLASTY PATELLO-FEMORAL (KNEE)  2011    Procedure:ARTHROPLASTY PATELLO-FEMORAL (KNEE); Left Knee [place] Arthrolplasty Prosthesis, Removal of hardware left knee; Surgeon:MONIE LLOYD; [facility]     ARTHROSCOPY KNEE      left     ARTHROSCOPY KNEE      right      SECTION      X 4     TONSILLECTOMY          MEDICATIONS:   Current  Outpatient Medications:      dexlansoprazole (DEXILANT) 60 MG CPDR CR capsule, Take 1 capsule (60 mg) by mouth daily, Disp: 90 capsule, Rfl: 3     fluticasone (FLONASE) 50 MCG/ACT nasal spray, Spray 1 spray into both nostrils 2 times daily, Disp: , Rfl:      sulfamethoxazole-trimethoprim (BACTRIM DS/SEPTRA DS) 800-160 MG tablet, Take 1 tablet by mouth 2 times daily, Disp: 20 tablet, Rfl: 0     diphenhydrAMINE (BENADRYL) 25 MG tablet, Take 25 mg by mouth every 6 hours as needed., Disp: , Rfl:      Pseudoephedrine HCl (SUDAFED PO), Take  by mouth at onset of headache., Disp: , Rfl:      zolpidem ER (AMBIEN CR) 6.25 MG CR tablet, Take 6.25 mg by mouth nightly as needed for sleep, Disp: , Rfl:      ALLERGIES:    Allergies   Allergen Reactions     Latex Itching     burning     Latex      PN: Converted from LW Latex Sensitivity Flag        SOCIAL HISTORY:   Social History     Socioeconomic History     Marital status:      Spouse name: Not on file     Number of children: Not on file     Years of education: Not on file     Highest education level: Not on file   Occupational History     Not on file   Social Needs     Financial resource strain: Not on file     Food insecurity:     Worry: Not on file     Inability: Not on file     Transportation needs:     Medical: Not on file     Non-medical: Not on file   Tobacco Use     Smoking status: Never Smoker     Smokeless tobacco: Never Used   Substance and Sexual Activity     Alcohol use: Yes     Comment: 4-6 drinks per week     Drug use: No     Sexual activity: Not Currently     Partners: Male   Lifestyle     Physical activity:     Days per week: Not on file     Minutes per session: Not on file     Stress: Not on file   Relationships     Social connections:     Talks on phone: Not on file     Gets together: Not on file     Attends Buddhist service: Not on file     Active member of club or organization: Not on file     Attends meetings of clubs or organizations: Not on file  "    Relationship status: Not on file     Intimate partner violence:     Fear of current or ex partner: Not on file     Emotionally abused: Not on file     Physically abused: Not on file     Forced sexual activity: Not on file   Other Topics Concern     Parent/sibling w/ CABG, MI or angioplasty before 65F 55M? Not Asked   Social History Narrative     Not on file        FAMILY HISTORY:   Family History   Problem Relation Age of Onset     Colon Cancer Mother      Diabetes No family hx of      Coronary Artery Disease No family hx of      Hypertension No family hx of      Hyperlipidemia No family hx of      Cerebrovascular Disease No family hx of      Breast Cancer No family hx of      Anxiety Disorder No family hx of      Osteoporosis No family hx of      Obesity No family hx of      Depression No family hx of      Prostate Cancer No family hx of      Other Cancer No family hx of      Mental Illness No family hx of      Substance Abuse No family hx of      Asthma No family hx of      Genetic Disorder No family hx of      Thyroid Disease No family hx of      Anesthesia Reaction No family hx of         EXAM:Vitals: /86 (BP Location: Left arm, Cuff Size: Adult Regular)   Temp 97.7  F (36.5  C) (Temporal)   Ht 1.45 m (4' 9.09\")   Wt 58.1 kg (128 lb)   BMI 27.61 kg/m    BMI= Body mass index is 27.61 kg/m .    General appearance: Patient is alert and fully cooperative with history & exam.  No sign of distress is noted during the visit.     Psychiatric: Affect is pleasant & appropriate.  Patient appears motivated to improve health.     Respiratory: Breathing is regular & unlabored while sitting.     HEENT: Hearing is intact to spoken word.  Speech is clear.  No gross evidence of visual impairment that would impact ambulation.     Vascular: DP & PT pulses are intact & regular bilaterally.  No significant edema or varicosities noted.  CFT and skin temperature is normal to both lower extremities.     Neurologic: Lower " extremity sensation is intact to light touch.  No evidence of weakness or contracture in the lower extremities.  No evidence of neuropathy.    Dermatologic: Skin is intact to both lower extremities with adequate texture, turgor and tone about the integument.  Medial border of left great toe in ingrown without drainage. The nail is dystrophic on medial border. Central and lateral nail is in normal repair.     Musculoskeletal: Patient is ambulatory without assistive device or brace.  No gross ankle deformity noted.  No foot or ankle joint effusion is noted.       ASSESSMENT:       ICD-10-CM    1. Ingrowing nail L60.0         PLAN:  Reviewed patient's chart in [facility].      2/27/2019   Minimal debridement occurred today to provide relief.  If this does not provide long-term relief would recommend matrixectomy of the left medial hallux nail.  She notes that she is discontinued the Bactrim because it was causing diarrhea and vomiting.  She was instructed to return to regular diet and probiotics.  If the diarrhea or vomiting persisted over the next 48 hours she was instructed to follow-up with PCP.   All questions were answered follow-up as needed.  She will be having total knee implant arthroplasty over the next few weeks and if she calls with continued or repeated drainage would recommend follow-up immediately      Bakari Montgomery DPM

## 2019-03-04 ENCOUNTER — DOCUMENTATION ONLY (OUTPATIENT)
Dept: EDUCATION SERVICES | Facility: CLINIC | Age: 64
End: 2019-03-04

## 2019-03-04 NOTE — PROGRESS NOTES
Nisha called to state that she took the on line class for joint replacement. She had no further questions or concerns.

## 2019-03-06 NOTE — PROGRESS NOTES
[address]  [facility] [address]  [phone]  Dept: [phone]    PRE-OP EVALUATION:  Today's date: 3/11/2019    Nisha Perez (: 1955) presents for pre-operative evaluation assessment as requested by Dr. Dai.  She requires evaluation and anesthesia risk assessment prior to undergoing surgery/procedure for treatment of right knee osteoarthritis.    Fax number for surgical facility: available electronically  Primary Physician: Emerson Spear  Type of Anesthesia Anticipated: Other    Patient has a Health Care Directive or Living Will:  YES     Preop Questions 3/11/2019   Who is doing your surgery? Dr. Dai   What are you having done? Total right knee arthroplasty   Date of Surgery/Procedure: 3/28/19   Facility or Hospital where procedure/surgery will be performed: [facility]   1.  Do you have a history of Heart attack, stroke, stent, coronary bypass surgery, or other heart surgery? No   2.  Do you ever have any pain or discomfort in your chest? No   3.  Do you have a history of  Heart Failure? No   4.   Are you troubled by shortness of breath when:  walking on a level surface, or up a slight hill, or at night? No   5.  Do you currently have a cold, bronchitis or other respiratory infection? No   6.  Do you have a cough, shortness of breath, or wheezing? No   7.  Do you sometimes get pains in the calves of your legs when you walk? No   8. Do you or anyone in your family have previous history of blood clots? No   9.  Do you or does anyone in your family have a serious bleeding problem such as prolonged bleeding following surgeries or cuts? No   10. Have you ever had problems with anemia or been told to take iron pills? No   11. Have you had any abnormal blood loss such as black, tarry or bloody stools, or abnormal vaginal bleeding? No   12. Have you ever had a blood transfusion? No   13. Have you or any of your relatives ever had problems with anesthesia?  No   14. Do you have sleep apnea, excessive snoring or daytime drowsiness? No   15. Do you have any prosthetic heart valves? No   16. Do you have prosthetic joints? YES - left knee replacement   17. Is there any chance that you may be pregnant? No       HPI:     HPI related to upcoming procedure: She has a history of chronic right knee pain, having undergone a previous right knee meniscal repair and a previous partial left knee arthroplasty. She has been experiencing worsening right knee pain without much improvement with conservative measures and was found to have osteoarthritis so will be undergoing a right total knee arthroplasty with Dr. Dai on 3/28/19.      See problem list for active medical problems.  Problems all longstanding and stable, except as noted/documented.  See ROS for pertinent symptoms related to these conditions.       She has chronic acid reflux that is only partially controlled on Dexilant. She has not undergone an endoscopy in many years.                                                                                                                                                         MEDICAL HISTORY:     Patient Active Problem List    Diagnosis Date Noted     Bilateral thumb pain 02/21/2019     Priority: Medium     Primary osteoarthritis of both first carpometacarpal joints 02/21/2019     Priority: Medium     Phobia, flying 03/28/2017     Priority: Medium     Gastroesophageal reflux disease without esophagitis 03/28/2017     Priority: Medium     Advanced directives, counseling/discussion 11/13/2015     Priority: Medium     Patient has a health directive--copy to be obtained as able.  Roderick Hunter MD         Anxiety disorder      Priority: Medium     Subluxation of patella, acquired 08/04/2011     Priority: Medium     Aftercare following joint replacement 08/04/2011     Priority: Medium     Knee joint replacement by other means 08/04/2011     Priority: Medium     Abnormal gait  2011     Priority: Medium     iamJOINT DERANGEMENT NEC-L/LEG 2006     Priority: Medium      Past Medical History:   Diagnosis Date     Anxiety disorder      Gastro-oesophageal reflux disease     esophagitis     Injury of knee, left      Insomnia      Osteoarthritis      Osteoporosis      Past Surgical History:   Procedure Laterality Date     APPENDECTOMY       ARTHRODESIS ANKLE      left     ARTHROPLASTY PATELLO-FEMORAL (KNEE)  2011    Procedure:ARTHROPLASTY PATELLO-FEMORAL (KNEE); Left Knee [place] Arthrolplasty Prosthesis, Removal of hardware left knee; Surgeon:TORI LLOYD; [facility]     ARTHROSCOPY KNEE      left     ARTHROSCOPY KNEE      right      SECTION      X 4     TONSILLECTOMY       Current Outpatient Medications   Medication Sig Dispense Refill     dexlansoprazole (DEXILANT) 60 MG CPDR CR capsule Take 1 capsule (60 mg) by mouth daily 90 capsule 3     diphenhydrAMINE (BENADRYL) 25 MG tablet Take 25 mg by mouth every 6 hours as needed.       fluticasone (FLONASE) 50 MCG/ACT nasal spray Spray 1 spray into both nostrils 2 times daily       Pseudoephedrine HCl (SUDAFED PO) Take  by mouth at onset of headache.       zolpidem ER (AMBIEN CR) 6.25 MG CR tablet Take 6.25 mg by mouth nightly as needed for sleep       OTC products: NSAIDS    Allergies   Allergen Reactions     Latex Itching     burning     Latex      PN: Converted from LW Latex Sensitivity Flag      Latex Allergy: NO    Social History     Tobacco Use     Smoking status: Never Smoker     Smokeless tobacco: Never Used   Substance Use Topics     Alcohol use: Yes     Comment: 4-6 drinks per week     History   Drug Use No       REVIEW OF SYSTEMS:   CONSTITUTIONAL: NEGATIVE for fever, chills, change in weight  INTEGUMENTARY/SKIN: NEGATIVE for worrisome rashes, moles or lesions  EYES: NEGATIVE for vision changes or irritation  ENT/MOUTH: NEGATIVE for ear, mouth and throat problems  RESP: NEGATIVE for significant cough or  "SOB  CV: NEGATIVE for chest pain, palpitations or peripheral edema  GI: +Chronic heartburn. NEGATIVE for nausea, abdominal pain, or change in bowel habits  : NEGATIVE for frequency, dysuria, or hematuria  MUSCULOSKELETAL: +Right knee pain.   NEURO: NEGATIVE for weakness, dizziness or paresthesias  ENDOCRINE: NEGATIVE for temperature intolerance, skin/hair changes  HEME: NEGATIVE for bleeding problems  PSYCHIATRIC: NEGATIVE for changes in mood or affect    EXAM:   /84   Pulse 91   Temp 98.7  F (37.1  C) (Temporal)   Resp 16   Ht 1.448 m (4' 9\")   Wt 58.1 kg (128 lb)   SpO2 96%   BMI 27.70 kg/m      GENERAL APPEARANCE: healthy, alert and no distress     EYES: EOMI,  PERRL     HENT: ear canals and TM's normal and nose and mouth without ulcers or lesions     NECK: no adenopathy, no asymmetry, masses, or scars and thyroid normal to palpation     RESP: lungs clear to auscultation - no rales, rhonchi or wheezes     CV: regular rates and rhythm, normal S1 S2, no S3 or S4 and no murmur, click or rub     ABDOMEN:  soft, nontender, no HSM or masses and bowel sounds normal     MS: extremities normal- no gross deformities noted, FROM in all extremities. Tenderness over the right medial joint line.     SKIN: no suspicious lesions or rashes     NEURO: Normal strength and tone, mentation intact and speech normal. Cranial nerves II-XII are grossly intact. DTRs are 2+/4 throughout and symmetric. Gait is stable.      PSYCH: mentation appears normal. and affect normal/bright     LYMPHATICS: No cervical adenopathy    DIAGNOSTICS:   EKG: appears normal, NSR, rate 71, normal axis, normal intervals, no acute ST/T changes c/w ischemia, T wave inversion in leads III, V1 and V2, no LVH by voltage criteria. No previous EKGs available for comparison.    Recent Labs   Lab Test 07/22/11  0547   HGB 12.9   POTASSIUM 4.4      Results for orders placed or performed in visit on 03/11/19   *UA reflex to Microscopic and Culture ([facility] " and [facility] (except [place] and [place])   Result Value Ref [facility]    Color Urine Yellow     Appearance Urine Clear     Glucose Urine Negative NEG^Negative mg/dL    Bilirubin Urine Negative NEG^Negative    Ketones Urine Negative NEG^Negative mg/dL    Specific Gravity Urine 1.010 1.003 - 1.035    Blood Urine Negative NEG^Negative    pH Urine 7.5 (H) 5.0 - 7.0 pH    Protein Albumin Urine Negative NEG^Negative mg/dL    Urobilinogen Urine 0.2 0.2 - 1.0 EU/dL    Nitrite Urine Negative NEG^Negative    Leukocyte Esterase Urine Negative NEG^Negative    Source Unspecified Urine    CBC with platelets   Result Value Ref [facility]    WBC 4.5 4.0 - 11.0 10e9/L    RBC Count 4.45 3.8 - 5.2 10e12/L    Hemoglobin 14.1 11.7 - 15.7 g/dL    Hematocrit 44.4 35.0 - 47.0 %     78 - 100 fl    MCH 31.7 26.5 - 33.0 pg    MCHC 31.8 31.5 - 36.5 g/dL    RDW 14.8 10.0 - 15.0 %    Platelet Count 245 150 - 450 10e9/L     IMPRESSION:   Reason for surgery/procedure: right knee osteoarthritis  Diagnosis/reason for consult: pre-operative clearance    The proposed surgical procedure is considered INTERMEDIATE risk.    REVISED CARDIAC RISK INDEX  The patient has the following serious cardiovascular risks for perioperative complications such as (MI, PE, VFib and 3  AV Block):  No serious cardiac risks  INTERPRETATION: 0 risks: Class I (very low risk - 0.4% complication rate)    The patient has the following additional risks for perioperative complications:  No identified additional risks      ICD-10-CM    1. Preop general physical exam Z01.818 EKG 12-lead complete w/read - Clinics     MRSA MSSA Presurgical Screen     *UA reflex to Microscopic and Culture ([facility] (except [place] and [place])     CBC with platelets     Basic metabolic panel  (Ca, Cl, CO2, Creat, Gluc, K, Na, BUN)   2. Osteoarthritis of right knee, unspecified osteoarthritis type M17.11    3. Elevated BP without diagnosis of hypertension R03.0 Basic  metabolic panel  (Ca, Cl, CO2, Creat, Gluc, K, Na, BUN)   4. Gastroesophageal reflux disease without esophagitis K21.9 dexlansoprazole (DEXILANT) 60 MG CPDR CR capsule     GASTROENTEROLOGY ADULT REF CONSULT ONLY     GASTROENTEROLOGY ADULT REF PROCEDURE ONLY [facility] [phone]; [facility]         History of borderline elevated BP which is stable today. I recommend she continue with a low salt diet and try to stay as active as possible.    EKG reveals non-specific T wave inversion in the precordial leads but patient does not have any significant cardiac history with a normal exam and has previously tolerated anaesthesia well multiple times. This is consistent with a normal variant so no further work up is warranted and she is cleared for surgery.     Pre-op labs ordered.    I recommend she undergo an updated endoscopy in the near future and see a GI specialist given her long history of partially controlled GERD. Dexilant refilled.    RECOMMENDATIONS:       --Patient is to take all scheduled medications on the day of surgery EXCEPT for modifications listed below.    Anticoagulant or Antiplatelet Medication Use  NSAIDS: Ibuprofen (Motrin):   Stop 10 days prior to surgery        APPROVAL GIVEN to proceed with proposed procedure, without further diagnostic evaluation       Signed Electronically by: Emerson Spear PA-C    Copy of this evaluation report is provided to requesting physician.    [facility] Preop Guidelines    Revised Cardiac Risk Index

## 2019-03-06 NOTE — PATIENT INSTRUCTIONS
Before Your Surgery      Call your surgeon if there is any change in your health. This includes signs of a cold or flu (such as a sore throat, runny nose, cough, rash or fever).    Do not smoke, drink alcohol or take over the counter medicine (unless your surgeon or primary care doctor tells you to) for the 24 hours before and after surgery.    If you take prescribed drugs: Follow your doctor s orders about which medicines to take and which to stop until after surgery.    Eating and drinking prior to surgery: follow the instructions from your surgeon    Take a shower or bath the night before surgery. Use the soap your surgeon gave you to gently clean your skin. If you do not have soap from your surgeon, use your regular soap. Do not shave or scrub the surgery site.  Wear clean pajamas and have clean sheets on your bed.       I would recommend an updated endoscopy in the future as well as seeing a GI specialist. There is a GI specialist in [place] named Dr. Cox. [phone]  They will call regarding the endoscopy.

## 2019-03-11 ENCOUNTER — OFFICE VISIT (OUTPATIENT)
Dept: FAMILY MEDICINE | Facility: OTHER | Age: 64
End: 2019-03-11
Payer: COMMERCIAL

## 2019-03-11 VITALS
OXYGEN SATURATION: 96 % | WEIGHT: 128 LBS | BODY MASS INDEX: 27.61 KG/M2 | SYSTOLIC BLOOD PRESSURE: 132 MMHG | TEMPERATURE: 98.7 F | HEART RATE: 91 BPM | DIASTOLIC BLOOD PRESSURE: 84 MMHG | RESPIRATION RATE: 16 BRPM | HEIGHT: 57 IN

## 2019-03-11 DIAGNOSIS — M17.11 OSTEOARTHRITIS OF RIGHT KNEE, UNSPECIFIED OSTEOARTHRITIS TYPE: ICD-10-CM

## 2019-03-11 DIAGNOSIS — R03.0 ELEVATED BP WITHOUT DIAGNOSIS OF HYPERTENSION: ICD-10-CM

## 2019-03-11 DIAGNOSIS — Z01.818 PREOP GENERAL PHYSICAL EXAM: Primary | ICD-10-CM

## 2019-03-11 DIAGNOSIS — K21.9 GASTROESOPHAGEAL REFLUX DISEASE WITHOUT ESOPHAGITIS: ICD-10-CM

## 2019-03-11 LAB
ALBUMIN UR-MCNC: NEGATIVE MG/DL
ANION GAP SERPL CALCULATED.3IONS-SCNC: 6 MMOL/L (ref 3–14)
APPEARANCE UR: CLEAR
BILIRUB UR QL STRIP: NEGATIVE
BUN SERPL-MCNC: 11 MG/DL (ref 7–30)
CALCIUM SERPL-MCNC: 9 MG/DL (ref 8.5–10.1)
CHLORIDE SERPL-SCNC: 106 MMOL/L (ref 94–109)
CO2 SERPL-SCNC: 27 MMOL/L (ref 20–32)
COLOR UR AUTO: YELLOW
CREAT SERPL-MCNC: 0.69 MG/DL (ref 0.52–1.04)
ERYTHROCYTE [DISTWIDTH] IN BLOOD BY AUTOMATED COUNT: 14.8 % (ref 10–15)
GFR SERPL CREATININE-BSD FRML MDRD: >90 ML/MIN/{1.73_M2}
GLUCOSE SERPL-MCNC: 98 MG/DL (ref 70–99)
GLUCOSE UR STRIP-MCNC: NEGATIVE MG/DL
HCT VFR BLD AUTO: 44.4 % (ref 35–47)
HGB BLD-MCNC: 14.1 G/DL (ref 11.7–15.7)
HGB UR QL STRIP: NEGATIVE
KETONES UR STRIP-MCNC: NEGATIVE MG/DL
LEUKOCYTE ESTERASE UR QL STRIP: NEGATIVE
MCH RBC QN AUTO: 31.7 PG (ref 26.5–33)
MCHC RBC AUTO-ENTMCNC: 31.8 G/DL (ref 31.5–36.5)
MCV RBC AUTO: 100 FL (ref 78–100)
NITRATE UR QL: NEGATIVE
PH UR STRIP: 7.5 PH (ref 5–7)
PLATELET # BLD AUTO: 245 10E9/L (ref 150–450)
POTASSIUM SERPL-SCNC: 4 MMOL/L (ref 3.4–5.3)
RBC # BLD AUTO: 4.45 10E12/L (ref 3.8–5.2)
SODIUM SERPL-SCNC: 139 MMOL/L (ref 133–144)
SOURCE: ABNORMAL
SP GR UR STRIP: 1.01 (ref 1–1.03)
UROBILINOGEN UR STRIP-ACNC: 0.2 EU/DL (ref 0.2–1)
WBC # BLD AUTO: 4.5 10E9/L (ref 4–11)

## 2019-03-11 PROCEDURE — 81003 URINALYSIS AUTO W/O SCOPE: CPT | Performed by: PHYSICIAN ASSISTANT

## 2019-03-11 PROCEDURE — 99215 OFFICE O/P EST HI 40 MIN: CPT | Performed by: PHYSICIAN ASSISTANT

## 2019-03-11 PROCEDURE — 93000 ELECTROCARDIOGRAM COMPLETE: CPT | Performed by: PHYSICIAN ASSISTANT

## 2019-03-11 PROCEDURE — 85027 COMPLETE CBC AUTOMATED: CPT | Performed by: PHYSICIAN ASSISTANT

## 2019-03-11 PROCEDURE — 36415 COLL VENOUS BLD VENIPUNCTURE: CPT | Performed by: PHYSICIAN ASSISTANT

## 2019-03-11 PROCEDURE — 80048 BASIC METABOLIC PNL TOTAL CA: CPT | Performed by: PHYSICIAN ASSISTANT

## 2019-03-11 RX ORDER — DEXLANSOPRAZOLE 60 MG/1
60 CAPSULE, DELAYED RELEASE ORAL DAILY
Qty: 90 CAPSULE | Refills: 3 | Status: SHIPPED | OUTPATIENT
Start: 2019-03-11 | End: 2019-07-19

## 2019-03-11 ASSESSMENT — ANXIETY QUESTIONNAIRES: GAD7 TOTAL SCORE: INCOMPLETE

## 2019-03-11 ASSESSMENT — MIFFLIN-ST. JEOR: SCORE: 1009.48

## 2019-03-12 ENCOUNTER — TELEPHONE (OUTPATIENT)
Dept: FAMILY MEDICINE | Facility: OTHER | Age: 64
End: 2019-03-12

## 2019-03-12 LAB
BACTERIA SPEC CULT: NORMAL
BACTERIA SPEC CULT: NORMAL
SPECIMEN SOURCE: NORMAL

## 2019-03-12 NOTE — TELEPHONE ENCOUNTER
Reason for call:  Patient reporting a symptom    Symptom or request: headache,bodyaches, Upper respiratory symptoms     Duration (how long have symptoms been present): today     Have you been treated for this before? No    Additional comments: pt states had pre op yesterday with [facility] and has surgery on 03/28/19. Pt states concerned about her symptoms affecting her surgery date and wondering if she should take tamiflu or anything. Please advise     Phone Number patient can be reached at:  Cell number on file:    Telephone Information:   Mobile [phone]       Best Time:  aNY    Can we leave a detailed message on this number:  YES    Call taken on 3/12/2019 at 8:24 AM by Deborah Levi

## 2019-03-12 NOTE — TELEPHONE ENCOUNTER
Nisha Perez is a 63 year old female who calls with symptoms.    NURSING ASSESSMENT:  Description:  Headache, body aches, congestion, sneezing and cough  Onset/duration:  This morning  Precip. factors:  Unknown  Associated symptoms:  None  Improves/worsens symptoms:  Nothing tried  Last exam/Treatment:  3/11/19  Allergies:   Allergies   Allergen Reactions     Latex Itching     burning     Latex      PN: Converted from LW Latex Sensitivity Flag       RECOMMENDED DISPOSITION:  Home care advice - rest, fluids, medication. Should follow up Monday in clinic or sooner if worsening as she has surgery scheduled 3/28/19  Will comply with recommendation: YES   If further questions/concerns or if Sx do not improve, worsen or new Sx develop, call your PCP or [facility] as soon as possible.    NOTES:  Disposition was determined by the first positive assessment question, therefore all previous assessment questions were negative.     Guideline used:  Telephone Triage Protocols for Nurses, Fifth Edition, Emilia Win, RN, BSN

## 2019-03-15 ENCOUNTER — OFFICE VISIT (OUTPATIENT)
Dept: ORTHOPEDICS | Facility: CLINIC | Age: 64
End: 2019-03-15
Payer: COMMERCIAL

## 2019-03-15 ENCOUNTER — TELEPHONE (OUTPATIENT)
Dept: ORTHOPEDICS | Facility: CLINIC | Age: 64
End: 2019-03-15

## 2019-03-15 ENCOUNTER — HOSPITAL ENCOUNTER (OUTPATIENT)
Facility: AMBULATORY SURGERY CENTER | Age: 64
End: 2019-03-15
Attending: ORTHOPAEDIC SURGERY
Payer: COMMERCIAL

## 2019-03-15 VITALS — WEIGHT: 128 LBS | HEIGHT: 57 IN | BODY MASS INDEX: 27.61 KG/M2

## 2019-03-15 DIAGNOSIS — M18.12 PRIMARY OSTEOARTHRITIS OF FIRST CARPOMETACARPAL JOINT OF LEFT HAND: Primary | ICD-10-CM

## 2019-03-15 ASSESSMENT — MIFFLIN-ST. JEOR: SCORE: 1009.48

## 2019-03-15 NOTE — LETTER
3/15/2019       RE: Nisha Perez  [address] [place] [id number]     Dear Colleague,    Thank you for referring your patient, Nisha Perez, to the [facility]. Please see a copy of my visit note below.    Nisha is here for follow-up of bilateral thumbs and hands.  Splints to provide some relief but her symptoms are ongoing.  She complains of pain at the base of both thumbs.  Hurts with gripping and grasping type activities.  She has numbness and tingling also, mostly median nerve distribution.    The past medical history was reviewed and documented in the chart.  This includes medications, surgeries, social history as well as review of systems.    Physical examination demonstrates tenderness, crepitance both thumb CMC joints.  Tinel's and Phalen's and carpal tunnel compression test are positive bilaterally also.  Normal hands no motor, no sensory deficits are noted.  No significant atrophy.  There is brisk capillary refill in all digits and a palpable radial pulse.  No overlying skin changes noted.    We reviewed x-rays again together, significant pantrapezial osteoarthritis noted bilaterally.    Impression:  #1 bilateral primary thumb CMC joint, pantrapezial osteoarthritis  #2 Bilateral Carpal Tunl. Syndrome    Plan:  Nisha again discussed options, surgical and nonsurgical.  At this point she would prefer to go ahead with surgery.  She would like to do her left hand first.  We will plan on a left thumb CMC arthroplasty as well as a left carpal tunnel release.  We discussed the risks and benefits of surgery as well as anticipated perioperative course.  These risks include but are not limited to infection, bleeding, stiffness, scarring, injury to blood vessels, tendons, nerves.  All questions were answered.  Arrangements for surgery will be made at her earliest possible convenience.    Again, thank you for allowing me to participate in the care  of your patient.      Sincerely,    Mehran Salgado MD

## 2019-03-15 NOTE — NURSING NOTE
Teaching Flowsheet   Relevant Diagnosis: Left carpal tunnel syndrome, left thumb CMC osteoarthritis -   Teaching Topic: Pre-op for left thumb CMC arthroplasty, left carpal tunnel release -surgery coordinator will call to schedule.  OR date needs to be at least 1 month after knee surgery     Person(s) involved in teaching:   Patient     Motivation Level:  Asks Questions: Yes  Cooperative: Yes  Receptive (willing/able to accept information): Yes  Any cultural factors/Yazdanism beliefs that may influence understanding or compliance? No     Patient demonstrates understanding of the following:  Reason for the appointment, diagnosis and treatment plan: Yes  Knowledge of proper use of medications and conditions for which they are ordered (with special attention to potential side effects or drug interactions): Yes  Which situations necessitate calling provider and whom to contact:      Teaching Concerns Addressed:   Pre-op H&P by primary provider  Aware of post-op expectations (OT for splint - to be worn full-time x 5-6 weeks)  Spouse will provide transportation and assistance with cares after surgery.     Proper use and care of forearm based thumb spica splint (medical equip, care aids, etc.): Yes  Nutritional needs and diet plan: Yes  Pain management techniques: Yes  Wound Care: Yes  How and/when to access community resources: Yes     Instructional Materials Used/Given: Pre-op packet, surgical soap     Time spent with patient: 12.

## 2019-03-15 NOTE — NURSING NOTE
"Reason For Visit:   Chief Complaint   Patient presents with     Left Thumb - Follow Up     Right Thumb - Follow Up     RECHECK     1 month follow up Bilateral thumb pain        Primary MD: Emerson Spear  Ref. MD: Sveta    Age: 63 year old    ?  No      Ht 1.448 m (4' 9\")   Wt 58.1 kg (128 lb)   BMI 27.70 kg/m        Pain Assessment  Patient Currently in Pain: Yes  0-10 Pain Scale: 4  Primary Pain Location: Finger (Comment which one)(Bilateral thumb)    Hand Dominance Evaluation  Hand Dominance: Left          QuickDASH Assessment  No flowsheet data found.       Current Outpatient Medications   Medication Sig Dispense Refill     dexlansoprazole (DEXILANT) 60 MG CPDR CR capsule Take 1 capsule (60 mg) by mouth daily 90 capsule 3     diphenhydrAMINE (BENADRYL) 25 MG tablet Take 25 mg by mouth every 6 hours as needed.       fluticasone (FLONASE) 50 MCG/ACT nasal spray Spray 1 spray into both nostrils 2 times daily       Pseudoephedrine HCl (SUDAFED PO) Take  by mouth at onset of headache.       zolpidem ER (AMBIEN CR) 6.25 MG CR tablet Take 6.25 mg by mouth nightly as needed for sleep         Allergies   Allergen Reactions     Latex Itching     burning     Latex      PN: Converted from LW Latex Sensitivity Flag       Rajesh Moreno CMA    "

## 2019-03-15 NOTE — PROGRESS NOTES
Nisha is here for follow-up of bilateral thumbs and hands.  Splints to provide some relief but her symptoms are ongoing.  She complains of pain at the base of both thumbs.  Hurts with gripping and grasping type activities.  She has numbness and tingling also, mostly median nerve distribution.    The past medical history was reviewed and documented in the chart.  This includes medications, surgeries, social history as well as review of systems.    Physical examination demonstrates tenderness, crepitance both thumb CMC joints.  Tinel's and Phalen's and carpal tunnel compression test are positive bilaterally also.  Normal hands no motor, no sensory deficits are noted.  No significant atrophy.  There is brisk capillary refill in all digits and a palpable radial pulse.  No overlying skin changes noted.    We reviewed x-rays again together, significant pantrapezial osteoarthritis noted bilaterally.    Impression:  #1 bilateral primary thumb CMC joint, pantrapezial osteoarthritis  #2 Bilateral Carpal Tunl. Syndrome    Plan:  Nisha again discussed options, surgical and nonsurgical.  At this point she would prefer to go ahead with surgery.  She would like to do her left hand first.  We will plan on a left thumb CMC arthroplasty as well as a left carpal tunnel release.  We discussed the risks and benefits of surgery as well as anticipated perioperative course.  These risks include but are not limited to infection, bleeding, stiffness, scarring, injury to blood vessels, tendons, nerves.  All questions were answered.  Arrangements for surgery will be made at her earliest possible convenience.

## 2019-03-15 NOTE — TELEPHONE ENCOUNTER
Patient is scheduled for surgery with Dr. Salgado    Spoke or left message with: patient via phone call     Date of Surgery: 5/3/19     Location: [facility]     Post-ops Made: 1 wk with provider     Pre-op with surgeon (if applicable): n/a     H&P: Scheduled with PCP    Additional imaging/appointments: n/a     Surgery packet: given in clinic     Additional comments: n/a

## 2019-03-27 ENCOUNTER — ANESTHESIA EVENT (OUTPATIENT)
Dept: SURGERY | Facility: CLINIC | Age: 64
DRG: 470 | End: 2019-03-27
Payer: COMMERCIAL

## 2019-03-27 NOTE — ANESTHESIA PREPROCEDURE EVALUATION
Anesthesia Pre-Procedure Evaluation    Patient: Nisha Perez   MRN:     [id number] Gender:   female   Age:    63 year old :      1955        Preoperative Diagnosis: Arthritis Of Right Knee   Procedure(s):  Right Total Knee Arthroplasty     Past Medical History:   Diagnosis Date     Anxiety disorder      Gastro-oesophageal reflux disease     esophagitis     Injury of knee, left      Insomnia      Osteoarthritis      Osteoporosis       Past Surgical History:   Procedure Laterality Date     APPENDECTOMY       ARTHRODESIS ANKLE      left     ARTHROPLASTY PATELLO-FEMORAL (KNEE)  2011    Procedure:ARTHROPLASTY PATELLO-FEMORAL (KNEE); Left Knee Latia Arthrolplasty Prosthesis, Removal of hardware left knee; Surgeon:TORI LLOYD; Location:[facility]     ARTHROSCOPY KNEE      left     ARTHROSCOPY KNEE      right      SECTION      X 4     TONSILLECTOMY            Anesthesia Evaluation     . Pt has had prior anesthetic. Type: General and Regional    No history of anesthetic complications          ROS/MED HX    ENT/Pulmonary:  - neg pulmonary ROS     Neurologic: Comment: Bilateral carpel tunnel syndrome      Cardiovascular:  - neg cardiovascular ROS   (+) ----. : . . . :. . Previous cardiac testing date:results:date: results:ECG reviewed date: results:Non specific t wave inversions V1-V3 date: results:          METS/Exercise Tolerance:  >4 METS   Hematologic:         Musculoskeletal: Comment: Rt knee osteoarthiritis,Biaterall carpel tunnel syndrome        GI/Hepatic:     (+) GERD Asymptomatic on medication,       Renal/Genitourinary:  - ROS Renal section negative       Endo:  - neg endo ROS       Psychiatric:     (+) psychiatric history anxiety      Infectious Disease:  - neg infectious disease ROS       Malignancy:      - no malignancy   Other:    - neg other ROS                     PHYSICAL EXAM:   Mental Status/Neuro: A/A/O   Airway: Facies: Feasible  Mallampati: I  Mouth/Opening: Full  TM distance: >  "6 cm  Neck ROM: Full   Respiratory: Auscultation: CTAB     Resp. Rate: Normal     Resp. Effort: Normal      CV: Rhythm: Regular  Rate: Age appropriate  Heart: Normal Sounds   Comments:      Dental: Normal                  Lab Results   Component Value Date    WBC 4.5 03/11/2019    HGB 14.1 03/11/2019    HCT 44.4 03/11/2019     03/11/2019     03/11/2019    POTASSIUM 4.0 03/11/2019    CHLORIDE 106 03/11/2019    CO2 27 03/11/2019    BUN 11 03/11/2019    CR 0.69 03/11/2019    GLC 98 03/11/2019    LEEANN 9.0 03/11/2019    TSH 1.43 06/19/2018       Preop Vitals  BP Readings from Last 3 Encounters:   03/11/19 132/84   02/27/19 128/86   02/21/19 126/90    Pulse Readings from Last 3 Encounters:   03/11/19 91   02/21/19 98   01/23/19 80      Resp Readings from Last 3 Encounters:   03/11/19 16   12/21/18 16   12/12/18 20    SpO2 Readings from Last 3 Encounters:   03/11/19 96%   02/21/19 97%   12/21/18 98%      Temp Readings from Last 1 Encounters:   03/11/19 37.1  C (98.7  F) (Temporal)    Ht Readings from Last 1 Encounters:   03/15/19 1.448 m (4' 9\")      Wt Readings from Last 1 Encounters:   03/15/19 58.1 kg (128 lb)    Estimated body mass index is 27.7 kg/m  as calculated from the following:    Height as of 3/15/19: 1.448 m (4' 9\").    Weight as of 3/15/19: 58.1 kg (128 lb).     LDA:  Peripheral IV 07/21/11 Left Hand (Active)   Number of days: 2806       Closed/Suction Drain Left Knee Accordion 10 Bengali (Active)   Number of days: 2806            Assessment:   ASA SCORE: 2    NPO Status: > 6 hours since completed Solid Foods   Documentation: H&P complete; Preop Testing complete; Consents complete   Proceeding: Proceed without further delay  Tobacco Use:  NO Active use of Tobacco/UNKNOWN Tobacco use status     Plan:   Anes. Type:  Regional; MAC     RA Details:  Pre Induction; Exparel; SS     RA-Location/Type: Spinal   Pre-Induction: Acetaminophen PO; Gabapentin PO   Induction:  Not applicable   Airway: Native " Airway   Access/Monitoring: PIV; 2nd PIV   Maintenance: Propofol; IV   Emergence: Procedure Site   Logistics: Observation/Admission     Postop Pain/Sedation Strategy:  Standard-Options: Opioids PRN; LA by Surgeon     PONV Management:  Adult Risk Factors: Female, Non-Smoker, Postop Opioids  Prevention: Propofol Infusion; Ondansetron; Dexamethasone                         Jose E Mar MD

## 2019-03-28 ENCOUNTER — HOSPITAL ENCOUNTER (INPATIENT)
Facility: CLINIC | Age: 64
LOS: 2 days | Discharge: HOME HEALTH | DRG: 470 | End: 2019-03-30
Attending: ORTHOPAEDIC SURGERY | Admitting: ORTHOPAEDIC SURGERY
Payer: COMMERCIAL

## 2019-03-28 ENCOUNTER — APPOINTMENT (OUTPATIENT)
Dept: PHYSICAL THERAPY | Facility: CLINIC | Age: 64
DRG: 470 | End: 2019-03-28
Attending: ORTHOPAEDIC SURGERY
Payer: COMMERCIAL

## 2019-03-28 ENCOUNTER — ANESTHESIA (OUTPATIENT)
Dept: SURGERY | Facility: CLINIC | Age: 64
DRG: 470 | End: 2019-03-28
Payer: COMMERCIAL

## 2019-03-28 DIAGNOSIS — R11.0 NAUSEA: ICD-10-CM

## 2019-03-28 DIAGNOSIS — Z98.890 STATUS POST KNEE SURGERY: Primary | ICD-10-CM

## 2019-03-28 LAB
ABO + RH BLD: NORMAL
ABO + RH BLD: NORMAL
ALBUMIN UR-MCNC: NEGATIVE MG/DL
APPEARANCE UR: CLEAR
BILIRUB UR QL STRIP: NEGATIVE
BLD GP AB SCN SERPL QL: NORMAL
BLOOD BANK CMNT PATIENT-IMP: NORMAL
COLOR UR AUTO: ABNORMAL
GLUCOSE SERPL-MCNC: 105 MG/DL (ref 70–99)
GLUCOSE UR STRIP-MCNC: NEGATIVE MG/DL
HGB UR QL STRIP: NEGATIVE
KETONES UR STRIP-MCNC: NEGATIVE MG/DL
LEUKOCYTE ESTERASE UR QL STRIP: NEGATIVE
NITRATE UR QL: NEGATIVE
PH UR STRIP: 7 PH (ref 5–7)
RBC #/AREA URNS AUTO: <1 /HPF (ref 0–2)
SOURCE: ABNORMAL
SP GR UR STRIP: 1 (ref 1–1.03)
SPECIMEN EXP DATE BLD: NORMAL
UROBILINOGEN UR STRIP-MCNC: NORMAL MG/DL (ref 0–2)
WBC #/AREA URNS AUTO: <1 /HPF (ref 0–5)

## 2019-03-28 PROCEDURE — 25000132 ZZH RX MED GY IP 250 OP 250 PS 637: Performed by: ORTHOPAEDIC SURGERY

## 2019-03-28 PROCEDURE — 25000128 H RX IP 250 OP 636: Performed by: ORTHOPAEDIC SURGERY

## 2019-03-28 PROCEDURE — 25000132 ZZH RX MED GY IP 250 OP 250 PS 637: Performed by: STUDENT IN AN ORGANIZED HEALTH CARE EDUCATION/TRAINING PROGRAM

## 2019-03-28 PROCEDURE — 36000064 ZZH SURGERY LEVEL 4 EA 15 ADDTL MIN - UMMC: Performed by: ORTHOPAEDIC SURGERY

## 2019-03-28 PROCEDURE — 27810169 ZZH OR IMPLANT GENERAL: Performed by: ORTHOPAEDIC SURGERY

## 2019-03-28 PROCEDURE — 25000128 H RX IP 250 OP 636: Performed by: NURSE ANESTHETIST, CERTIFIED REGISTERED

## 2019-03-28 PROCEDURE — 27210794 ZZH OR GENERAL SUPPLY STERILE: Performed by: ORTHOPAEDIC SURGERY

## 2019-03-28 PROCEDURE — 25800030 ZZH RX IP 258 OP 636: Performed by: STUDENT IN AN ORGANIZED HEALTH CARE EDUCATION/TRAINING PROGRAM

## 2019-03-28 PROCEDURE — 25000128 H RX IP 250 OP 636: Performed by: STUDENT IN AN ORGANIZED HEALTH CARE EDUCATION/TRAINING PROGRAM

## 2019-03-28 PROCEDURE — 25000125 ZZHC RX 250: Performed by: ORTHOPAEDIC SURGERY

## 2019-03-28 PROCEDURE — 71000015 ZZH RECOVERY PHASE 1 LEVEL 2 EA ADDTL HR: Performed by: ORTHOPAEDIC SURGERY

## 2019-03-28 PROCEDURE — 25800030 ZZH RX IP 258 OP 636: Performed by: ORTHOPAEDIC SURGERY

## 2019-03-28 PROCEDURE — 86850 RBC ANTIBODY SCREEN: CPT | Performed by: ORTHOPAEDIC SURGERY

## 2019-03-28 PROCEDURE — 99232 SBSQ HOSP IP/OBS MODERATE 35: CPT | Performed by: INTERNAL MEDICINE

## 2019-03-28 PROCEDURE — 40000170 ZZH STATISTIC PRE-PROCEDURE ASSESSMENT II: Performed by: ORTHOPAEDIC SURGERY

## 2019-03-28 PROCEDURE — 25800025 ZZH RX 258: Performed by: ORTHOPAEDIC SURGERY

## 2019-03-28 PROCEDURE — 82947 ASSAY GLUCOSE BLOOD QUANT: CPT | Performed by: ANESTHESIOLOGY

## 2019-03-28 PROCEDURE — 0SRC0J9 REPLACEMENT OF RIGHT KNEE JOINT WITH SYNTHETIC SUBSTITUTE, CEMENTED, OPEN APPROACH: ICD-10-PCS | Performed by: ORTHOPAEDIC SURGERY

## 2019-03-28 PROCEDURE — 71000014 ZZH RECOVERY PHASE 1 LEVEL 2 FIRST HR: Performed by: ORTHOPAEDIC SURGERY

## 2019-03-28 PROCEDURE — 37000008 ZZH ANESTHESIA TECHNICAL FEE, 1ST 30 MIN: Performed by: ORTHOPAEDIC SURGERY

## 2019-03-28 PROCEDURE — 97116 GAIT TRAINING THERAPY: CPT | Mod: GP

## 2019-03-28 PROCEDURE — 97110 THERAPEUTIC EXERCISES: CPT | Mod: GP

## 2019-03-28 PROCEDURE — 97530 THERAPEUTIC ACTIVITIES: CPT | Mod: GP

## 2019-03-28 PROCEDURE — 12000001 ZZH R&B MED SURG/OB UMMC

## 2019-03-28 PROCEDURE — 86900 BLOOD TYPING SEROLOGIC ABO: CPT | Performed by: ORTHOPAEDIC SURGERY

## 2019-03-28 PROCEDURE — 97161 PT EVAL LOW COMPLEX 20 MIN: CPT | Mod: GP

## 2019-03-28 PROCEDURE — 37000009 ZZH ANESTHESIA TECHNICAL FEE, EACH ADDTL 15 MIN: Performed by: ORTHOPAEDIC SURGERY

## 2019-03-28 PROCEDURE — 99207 ZZC CDG-HISTORY COMP: MEETS EXP. PROBLEM FOCUSED - DOWN CODED LACK OF HPI: CPT | Performed by: INTERNAL MEDICINE

## 2019-03-28 PROCEDURE — 81001 URINALYSIS AUTO W/SCOPE: CPT | Performed by: ORTHOPAEDIC SURGERY

## 2019-03-28 PROCEDURE — C1776 JOINT DEVICE (IMPLANTABLE): HCPCS | Performed by: ORTHOPAEDIC SURGERY

## 2019-03-28 PROCEDURE — 36000062 ZZH SURGERY LEVEL 4 1ST 30 MIN - UMMC: Performed by: ORTHOPAEDIC SURGERY

## 2019-03-28 PROCEDURE — 36415 COLL VENOUS BLD VENIPUNCTURE: CPT | Performed by: ORTHOPAEDIC SURGERY

## 2019-03-28 PROCEDURE — 86901 BLOOD TYPING SEROLOGIC RH(D): CPT | Performed by: ORTHOPAEDIC SURGERY

## 2019-03-28 DEVICE — IMPLANTABLE DEVICE: Type: IMPLANTABLE DEVICE | Site: KNEE | Status: FUNCTIONAL

## 2019-03-28 DEVICE — BONE CEMENT STRK SIMPLEX P SPEEDSET 6192-1-001: Type: IMPLANTABLE DEVICE | Site: KNEE | Status: FUNCTIONAL

## 2019-03-28 DEVICE — IMP BASEPLATE TIBIAL HOWM TRI 2 5520-B-200: Type: IMPLANTABLE DEVICE | Site: KNEE | Status: FUNCTIONAL

## 2019-03-28 RX ORDER — ACETAMINOPHEN 325 MG/1
650 TABLET ORAL EVERY 4 HOURS PRN
Status: DISCONTINUED | OUTPATIENT
Start: 2019-03-31 | End: 2019-03-30 | Stop reason: HOSPADM

## 2019-03-28 RX ORDER — CEFAZOLIN SODIUM 2 G/100ML
2 INJECTION, SOLUTION INTRAVENOUS
Status: COMPLETED | OUTPATIENT
Start: 2019-03-28 | End: 2019-03-28

## 2019-03-28 RX ORDER — FENTANYL CITRATE 50 UG/ML
INJECTION, SOLUTION INTRAMUSCULAR; INTRAVENOUS PRN
Status: DISCONTINUED | OUTPATIENT
Start: 2019-03-28 | End: 2019-03-28

## 2019-03-28 RX ORDER — ACETAMINOPHEN 325 MG/1
975 TABLET ORAL ONCE
Status: DISCONTINUED | OUTPATIENT
Start: 2019-03-28 | End: 2019-03-28

## 2019-03-28 RX ORDER — HYDROMORPHONE HYDROCHLORIDE 1 MG/ML
.3-.5 INJECTION, SOLUTION INTRAMUSCULAR; INTRAVENOUS; SUBCUTANEOUS EVERY 5 MIN PRN
Status: DISCONTINUED | OUTPATIENT
Start: 2019-03-28 | End: 2019-03-28 | Stop reason: HOSPADM

## 2019-03-28 RX ORDER — ZOLPIDEM TARTRATE 6.25 MG/1
6.25 TABLET, FILM COATED, EXTENDED RELEASE ORAL
Status: DISCONTINUED | OUTPATIENT
Start: 2019-03-28 | End: 2019-03-30 | Stop reason: HOSPADM

## 2019-03-28 RX ORDER — GABAPENTIN 300 MG/1
300 CAPSULE ORAL
Status: COMPLETED | OUTPATIENT
Start: 2019-03-28 | End: 2019-03-28

## 2019-03-28 RX ORDER — HYDROMORPHONE HYDROCHLORIDE 1 MG/ML
.3-.5 INJECTION, SOLUTION INTRAMUSCULAR; INTRAVENOUS; SUBCUTANEOUS
Status: DISCONTINUED | OUTPATIENT
Start: 2019-03-28 | End: 2019-03-30 | Stop reason: HOSPADM

## 2019-03-28 RX ORDER — NALOXONE HYDROCHLORIDE 0.4 MG/ML
.1-.4 INJECTION, SOLUTION INTRAMUSCULAR; INTRAVENOUS; SUBCUTANEOUS
Status: DISCONTINUED | OUTPATIENT
Start: 2019-03-28 | End: 2019-03-28

## 2019-03-28 RX ORDER — BUPIVACAINE HYDROCHLORIDE 7.5 MG/ML
INJECTION, SOLUTION INTRASPINAL PRN
Status: DISCONTINUED | OUTPATIENT
Start: 2019-03-28 | End: 2019-03-28

## 2019-03-28 RX ORDER — BISACODYL 10 MG
10 SUPPOSITORY, RECTAL RECTAL DAILY
Status: DISCONTINUED | OUTPATIENT
Start: 2019-03-29 | End: 2019-03-30 | Stop reason: HOSPADM

## 2019-03-28 RX ORDER — FENTANYL CITRATE 50 UG/ML
25-50 INJECTION, SOLUTION INTRAMUSCULAR; INTRAVENOUS
Status: DISCONTINUED | OUTPATIENT
Start: 2019-03-28 | End: 2019-03-28 | Stop reason: HOSPADM

## 2019-03-28 RX ORDER — ONDANSETRON 2 MG/ML
INJECTION INTRAMUSCULAR; INTRAVENOUS PRN
Status: DISCONTINUED | OUTPATIENT
Start: 2019-03-28 | End: 2019-03-28

## 2019-03-28 RX ORDER — OXYCODONE HYDROCHLORIDE 5 MG/1
5-10 TABLET ORAL
Status: DISCONTINUED | OUTPATIENT
Start: 2019-03-28 | End: 2019-03-30

## 2019-03-28 RX ORDER — GABAPENTIN 300 MG/1
300 CAPSULE ORAL ONCE
Status: DISCONTINUED | OUTPATIENT
Start: 2019-03-28 | End: 2019-03-28

## 2019-03-28 RX ORDER — CEFAZOLIN SODIUM 1 G/3ML
1 INJECTION, POWDER, FOR SOLUTION INTRAMUSCULAR; INTRAVENOUS EVERY 8 HOURS
Status: COMPLETED | OUTPATIENT
Start: 2019-03-28 | End: 2019-03-29

## 2019-03-28 RX ORDER — LIDOCAINE 40 MG/G
CREAM TOPICAL
Status: DISCONTINUED | OUTPATIENT
Start: 2019-03-28 | End: 2019-03-28 | Stop reason: HOSPADM

## 2019-03-28 RX ORDER — DEXAMETHASONE SODIUM PHOSPHATE 4 MG/ML
INJECTION, SOLUTION INTRA-ARTICULAR; INTRALESIONAL; INTRAMUSCULAR; INTRAVENOUS; SOFT TISSUE PRN
Status: DISCONTINUED | OUTPATIENT
Start: 2019-03-28 | End: 2019-03-28

## 2019-03-28 RX ORDER — DIPHENHYDRAMINE HCL 25 MG
25 CAPSULE ORAL EVERY 6 HOURS PRN
Status: DISCONTINUED | OUTPATIENT
Start: 2019-03-28 | End: 2019-03-30 | Stop reason: HOSPADM

## 2019-03-28 RX ORDER — PROPOFOL 10 MG/ML
INJECTION, EMULSION INTRAVENOUS CONTINUOUS PRN
Status: DISCONTINUED | OUTPATIENT
Start: 2019-03-28 | End: 2019-03-28

## 2019-03-28 RX ORDER — AMOXICILLIN 250 MG
2 CAPSULE ORAL 2 TIMES DAILY
Status: DISCONTINUED | OUTPATIENT
Start: 2019-03-28 | End: 2019-03-30 | Stop reason: HOSPADM

## 2019-03-28 RX ORDER — ACETAMINOPHEN 325 MG/1
975 TABLET ORAL EVERY 8 HOURS
Status: DISCONTINUED | OUTPATIENT
Start: 2019-03-28 | End: 2019-03-30 | Stop reason: HOSPADM

## 2019-03-28 RX ORDER — ONDANSETRON 4 MG/1
4 TABLET, ORALLY DISINTEGRATING ORAL EVERY 30 MIN PRN
Status: DISCONTINUED | OUTPATIENT
Start: 2019-03-28 | End: 2019-03-28 | Stop reason: HOSPADM

## 2019-03-28 RX ORDER — KETOROLAC TROMETHAMINE 30 MG/ML
15 INJECTION, SOLUTION INTRAMUSCULAR; INTRAVENOUS EVERY 6 HOURS PRN
Status: COMPLETED | OUTPATIENT
Start: 2019-03-28 | End: 2019-03-29

## 2019-03-28 RX ORDER — SODIUM CHLORIDE, SODIUM LACTATE, POTASSIUM CHLORIDE, CALCIUM CHLORIDE 600; 310; 30; 20 MG/100ML; MG/100ML; MG/100ML; MG/100ML
INJECTION, SOLUTION INTRAVENOUS CONTINUOUS
Status: DISCONTINUED | OUTPATIENT
Start: 2019-03-28 | End: 2019-03-28 | Stop reason: HOSPADM

## 2019-03-28 RX ORDER — CELECOXIB 200 MG/1
200 CAPSULE ORAL ONCE
Status: COMPLETED | OUTPATIENT
Start: 2019-03-28 | End: 2019-03-28

## 2019-03-28 RX ORDER — ONDANSETRON 2 MG/ML
4 INJECTION INTRAMUSCULAR; INTRAVENOUS EVERY 30 MIN PRN
Status: DISCONTINUED | OUTPATIENT
Start: 2019-03-28 | End: 2019-03-28 | Stop reason: HOSPADM

## 2019-03-28 RX ORDER — PROPOFOL 10 MG/ML
INJECTION, EMULSION INTRAVENOUS PRN
Status: DISCONTINUED | OUTPATIENT
Start: 2019-03-28 | End: 2019-03-28

## 2019-03-28 RX ORDER — AMOXICILLIN 250 MG
1 CAPSULE ORAL 2 TIMES DAILY
Status: DISCONTINUED | OUTPATIENT
Start: 2019-03-28 | End: 2019-03-30 | Stop reason: HOSPADM

## 2019-03-28 RX ORDER — PSEUDOEPHEDRINE HCL 30 MG
30 TABLET ORAL EVERY 6 HOURS PRN
Status: DISCONTINUED | OUTPATIENT
Start: 2019-03-28 | End: 2019-03-30 | Stop reason: HOSPADM

## 2019-03-28 RX ORDER — ACETAMINOPHEN 325 MG/1
975 TABLET ORAL ONCE
Status: COMPLETED | OUTPATIENT
Start: 2019-03-28 | End: 2019-03-28

## 2019-03-28 RX ORDER — LIDOCAINE 40 MG/G
CREAM TOPICAL
Status: DISCONTINUED | OUTPATIENT
Start: 2019-03-28 | End: 2019-03-30 | Stop reason: HOSPADM

## 2019-03-28 RX ORDER — ALUMINA, MAGNESIA, AND SIMETHICONE 2400; 2400; 240 MG/30ML; MG/30ML; MG/30ML
30 SUSPENSION ORAL EVERY 4 HOURS PRN
Status: DISCONTINUED | OUTPATIENT
Start: 2019-03-28 | End: 2019-03-30 | Stop reason: HOSPADM

## 2019-03-28 RX ORDER — DIPHENHYDRAMINE HYDROCHLORIDE 50 MG/ML
25 INJECTION INTRAMUSCULAR; INTRAVENOUS EVERY 6 HOURS PRN
Status: DISCONTINUED | OUTPATIENT
Start: 2019-03-28 | End: 2019-03-30 | Stop reason: HOSPADM

## 2019-03-28 RX ORDER — DEXLANSOPRAZOLE 60 MG/1
60 CAPSULE, DELAYED RELEASE ORAL DAILY
Status: DISCONTINUED | OUTPATIENT
Start: 2019-03-29 | End: 2019-03-30 | Stop reason: HOSPADM

## 2019-03-28 RX ORDER — ONDANSETRON 2 MG/ML
4 INJECTION INTRAMUSCULAR; INTRAVENOUS EVERY 6 HOURS PRN
Status: DISCONTINUED | OUTPATIENT
Start: 2019-03-28 | End: 2019-03-30 | Stop reason: HOSPADM

## 2019-03-28 RX ORDER — ONDANSETRON 4 MG/1
4 TABLET, ORALLY DISINTEGRATING ORAL EVERY 6 HOURS PRN
Status: DISCONTINUED | OUTPATIENT
Start: 2019-03-28 | End: 2019-03-30

## 2019-03-28 RX ORDER — FLUTICASONE PROPIONATE 50 MCG
1 SPRAY, SUSPENSION (ML) NASAL 2 TIMES DAILY
Status: DISCONTINUED | OUTPATIENT
Start: 2019-03-29 | End: 2019-03-30 | Stop reason: HOSPADM

## 2019-03-28 RX ORDER — SODIUM CHLORIDE, SODIUM LACTATE, POTASSIUM CHLORIDE, CALCIUM CHLORIDE 600; 310; 30; 20 MG/100ML; MG/100ML; MG/100ML; MG/100ML
INJECTION, SOLUTION INTRAVENOUS CONTINUOUS PRN
Status: DISCONTINUED | OUTPATIENT
Start: 2019-03-28 | End: 2019-03-28

## 2019-03-28 RX ORDER — NALOXONE HYDROCHLORIDE 0.4 MG/ML
.1-.4 INJECTION, SOLUTION INTRAMUSCULAR; INTRAVENOUS; SUBCUTANEOUS
Status: DISCONTINUED | OUTPATIENT
Start: 2019-03-28 | End: 2019-03-30 | Stop reason: HOSPADM

## 2019-03-28 RX ORDER — SODIUM CHLORIDE, SODIUM LACTATE, POTASSIUM CHLORIDE, CALCIUM CHLORIDE 600; 310; 30; 20 MG/100ML; MG/100ML; MG/100ML; MG/100ML
INJECTION, SOLUTION INTRAVENOUS CONTINUOUS
Status: DISCONTINUED | OUTPATIENT
Start: 2019-03-28 | End: 2019-03-30 | Stop reason: HOSPADM

## 2019-03-28 RX ORDER — CEFAZOLIN SODIUM 1 G/3ML
1 INJECTION, POWDER, FOR SOLUTION INTRAMUSCULAR; INTRAVENOUS SEE ADMIN INSTRUCTIONS
Status: DISCONTINUED | OUTPATIENT
Start: 2019-03-28 | End: 2019-03-28 | Stop reason: HOSPADM

## 2019-03-28 RX ADMIN — HYDROMORPHONE HYDROCHLORIDE 0.3 MG: 1 INJECTION, SOLUTION INTRAMUSCULAR; INTRAVENOUS; SUBCUTANEOUS at 10:24

## 2019-03-28 RX ADMIN — KETOROLAC TROMETHAMINE 15 MG: 30 INJECTION, SOLUTION INTRAMUSCULAR; INTRAVENOUS at 23:58

## 2019-03-28 RX ADMIN — CEFAZOLIN SODIUM 2 G: 2 INJECTION, SOLUTION INTRAVENOUS at 07:41

## 2019-03-28 RX ADMIN — FENTANYL CITRATE 25 MCG: 50 INJECTION INTRAMUSCULAR; INTRAVENOUS at 11:40

## 2019-03-28 RX ADMIN — CEFAZOLIN SODIUM 1 G: 1 INJECTION, POWDER, FOR SOLUTION INTRAMUSCULAR; INTRAVENOUS at 16:28

## 2019-03-28 RX ADMIN — KETOROLAC TROMETHAMINE 15 MG: 30 INJECTION, SOLUTION INTRAMUSCULAR; INTRAVENOUS at 11:57

## 2019-03-28 RX ADMIN — PROPOFOL 20 MG: 10 INJECTION, EMULSION INTRAVENOUS at 08:41

## 2019-03-28 RX ADMIN — FENTANYL CITRATE 25 MCG: 50 INJECTION, SOLUTION INTRAMUSCULAR; INTRAVENOUS at 10:12

## 2019-03-28 RX ADMIN — CEFAZOLIN SODIUM 1 G: 1 INJECTION, POWDER, FOR SOLUTION INTRAMUSCULAR; INTRAVENOUS at 23:58

## 2019-03-28 RX ADMIN — FENTANYL CITRATE 25 MCG: 50 INJECTION, SOLUTION INTRAMUSCULAR; INTRAVENOUS at 07:34

## 2019-03-28 RX ADMIN — ONDANSETRON 4 MG: 2 INJECTION INTRAMUSCULAR; INTRAVENOUS at 09:55

## 2019-03-28 RX ADMIN — PROPOFOL 10 MG: 10 INJECTION, EMULSION INTRAVENOUS at 08:12

## 2019-03-28 RX ADMIN — SODIUM CHLORIDE, POTASSIUM CHLORIDE, SODIUM LACTATE AND CALCIUM CHLORIDE: 600; 310; 30; 20 INJECTION, SOLUTION INTRAVENOUS at 20:43

## 2019-03-28 RX ADMIN — HYDROMORPHONE HYDROCHLORIDE 0.2 MG: 1 INJECTION, SOLUTION INTRAMUSCULAR; INTRAVENOUS; SUBCUTANEOUS at 10:21

## 2019-03-28 RX ADMIN — CELECOXIB 200 MG: 200 CAPSULE ORAL at 06:28

## 2019-03-28 RX ADMIN — FENTANYL CITRATE 25 MCG: 50 INJECTION INTRAMUSCULAR; INTRAVENOUS at 10:50

## 2019-03-28 RX ADMIN — PROPOFOL 20 MG: 10 INJECTION, EMULSION INTRAVENOUS at 08:56

## 2019-03-28 RX ADMIN — ASPIRIN 325 MG: 325 TABLET, COATED ORAL at 20:42

## 2019-03-28 RX ADMIN — ACETAMINOPHEN 975 MG: 325 TABLET, FILM COATED ORAL at 06:28

## 2019-03-28 RX ADMIN — OXYCODONE HYDROCHLORIDE 5 MG: 5 TABLET ORAL at 20:43

## 2019-03-28 RX ADMIN — ACETAMINOPHEN 975 MG: 325 TABLET, FILM COATED ORAL at 16:28

## 2019-03-28 RX ADMIN — SODIUM CHLORIDE 1 G: 9 INJECTION, SOLUTION INTRAVENOUS at 08:13

## 2019-03-28 RX ADMIN — PROPOFOL 20 MG: 10 INJECTION, EMULSION INTRAVENOUS at 07:54

## 2019-03-28 RX ADMIN — GABAPENTIN 300 MG: 300 CAPSULE ORAL at 06:28

## 2019-03-28 RX ADMIN — HYDROMORPHONE HYDROCHLORIDE 0.5 MG: 1 INJECTION, SOLUTION INTRAMUSCULAR; INTRAVENOUS; SUBCUTANEOUS at 10:46

## 2019-03-28 RX ADMIN — SENNOSIDES AND DOCUSATE SODIUM 2 TABLET: 8.6; 5 TABLET ORAL at 20:43

## 2019-03-28 RX ADMIN — PROPOFOL 20 MG: 10 INJECTION, EMULSION INTRAVENOUS at 09:30

## 2019-03-28 RX ADMIN — BUPIVACAINE HYDROCHLORIDE IN DEXTROSE 1.4 ML: 7.5 INJECTION, SOLUTION SUBARACHNOID at 07:45

## 2019-03-28 RX ADMIN — DEXAMETHASONE SODIUM PHOSPHATE 8 MG: 4 INJECTION, SOLUTION INTRAMUSCULAR; INTRAVENOUS at 07:46

## 2019-03-28 RX ADMIN — PROPOFOL 10 MG: 10 INJECTION, EMULSION INTRAVENOUS at 08:27

## 2019-03-28 RX ADMIN — OXYCODONE HYDROCHLORIDE 5 MG: 5 TABLET ORAL at 13:14

## 2019-03-28 RX ADMIN — HYDROMORPHONE HYDROCHLORIDE 0.2 MG: 1 INJECTION, SOLUTION INTRAMUSCULAR; INTRAVENOUS; SUBCUTANEOUS at 10:14

## 2019-03-28 RX ADMIN — OXYCODONE HYDROCHLORIDE 5 MG: 5 TABLET ORAL at 23:58

## 2019-03-28 RX ADMIN — OXYCODONE HYDROCHLORIDE 5 MG: 5 TABLET ORAL at 16:28

## 2019-03-28 RX ADMIN — HYDROMORPHONE HYDROCHLORIDE 0.3 MG: 1 INJECTION, SOLUTION INTRAMUSCULAR; INTRAVENOUS; SUBCUTANEOUS at 10:18

## 2019-03-28 RX ADMIN — FENTANYL CITRATE 25 MCG: 50 INJECTION INTRAMUSCULAR; INTRAVENOUS at 11:06

## 2019-03-28 RX ADMIN — FENTANYL CITRATE 25 MCG: 50 INJECTION, SOLUTION INTRAMUSCULAR; INTRAVENOUS at 10:19

## 2019-03-28 RX ADMIN — PROPOFOL 20 MG: 10 INJECTION, EMULSION INTRAVENOUS at 08:21

## 2019-03-28 RX ADMIN — SODIUM CHLORIDE, POTASSIUM CHLORIDE, SODIUM LACTATE AND CALCIUM CHLORIDE: 600; 310; 30; 20 INJECTION, SOLUTION INTRAVENOUS at 07:34

## 2019-03-28 RX ADMIN — PROPOFOL 50 MCG/KG/MIN: 10 INJECTION, EMULSION INTRAVENOUS at 07:50

## 2019-03-28 RX ADMIN — HYDROMORPHONE HYDROCHLORIDE 0.5 MG: 1 INJECTION, SOLUTION INTRAMUSCULAR; INTRAVENOUS; SUBCUTANEOUS at 11:04

## 2019-03-28 RX ADMIN — ACETAMINOPHEN 975 MG: 325 TABLET, FILM COATED ORAL at 23:58

## 2019-03-28 RX ADMIN — PROPOFOL 20 MG: 10 INJECTION, EMULSION INTRAVENOUS at 07:39

## 2019-03-28 RX ADMIN — CEFAZOLIN 1 G: 1 INJECTION, POWDER, FOR SOLUTION INTRAMUSCULAR; INTRAVENOUS at 09:51

## 2019-03-28 ASSESSMENT — ACTIVITIES OF DAILY LIVING (ADL)
RETIRED_EATING: 0-->INDEPENDENT
ADLS_ACUITY_SCORE: 12
ADLS_ACUITY_SCORE: 13
DRESS: 0-->INDEPENDENT

## 2019-03-28 ASSESSMENT — MIFFLIN-ST. JEOR: SCORE: 1006

## 2019-03-28 NOTE — ANESTHESIA CARE TRANSFER NOTE
Patient: Nisha Perez    Procedure(s):  Right Total Knee Arthroplasty    Diagnosis: Arthritis Of Right Knee  Diagnosis Additional Information: No value filed.    Anesthesia Type:   No value filed.     Note:  Airway :Nasal Cannula  Patient transferred to:PACU  Handoff Report: Identifed the Patient, Identified the Reponsible Provider, Reviewed the pertinent medical history, Discussed the surgical course, Reviewed Intra-OP anesthesia mangement and issues during anesthesia, Set expectations for post-procedure period and Allowed opportunity for questions and acknowledgement of understanding      Vitals: (Last set prior to Anesthesia Care Transfer)    CRNA VITALS  3/28/2019 0959 - 3/28/2019 1036      3/28/2019             Resp Rate (observed):  16                Electronically Signed By: Caitlin Mckeon CRNA, MAKENNA REZA  March 28, 2019  10:36 AM

## 2019-03-28 NOTE — OP NOTE
PREOPERATIVE DIAGNOSES: Right  Knee Osteoarthritis,            Genu Varum (12 degrees)            BMI 27    POSTOPERATIVE DIAGNOSES:  Same       PROCEDURES:     Total knee arthoplasty right  LRL (15 mm)        COMPONENTS USED:  Moises Triathalon System,   #2 CR femur,#2 tibia, 9mm CS  tibial insert,   no patella resurfacing      OPERATORS:  Monie Dai MD    ASSISTANTS:  Kavon Batista MD     ANESTHESIA:  Spinal Anesthesia supplemented with Erin-articular Injection    Exam under anesthesia revealed range of motion 10 degrees to 150 degrees.      OPERATIVE FINDINGS: Grade 4 MFC, grade 4 ant medial MTP, grade 1  LFC, grade 1 LTP, grade 2 patella(medial facet homer), grade1 trochlea     DESCRIPTION OF PROCEDURE:  Under Spinal Anesthesia, the patient was positioned supine on the operating room table. The patient's leg was prepped and draped in usual sterile fashion.  A pause was performed   identifying the correct leg and administration of antibiotics.      A tourniquet, which was on the upper aspect of the patient's leg was elevated    to 250 mmHg after Esmarch exsanguination of the leg.  A midline incision   was used to enter the knee.  A vastus splitting incision was used to enter the joint.    Findings as above.      The procedure began with soft tissue balancing by  a  moderate release of the  medial side down to but not including the distal  MCL.    We used an intramedullary femoral guide set at 6 degrees of anatomic valgus and making a distal cut of 10 mm.  We then made  the appropriate anterior and posterior cuts to fit a #2 femur.      We then turned our attention to the tibia.  We used an extramedullary guide set at neutral  and made a minimal cut on the bone on the medial tibial side.  We used the tensioning device to evaluate balance between the collaterals and between flexion and extension and felt that we had good balance between the collaterals and between flexion and extension.    We made the  appropriate chamfer cuts on the femur to fit it for a CR femur.     We then returned to the tibia and fit the tibia to a #2 tibia base plate.      We then turned attention to the patella.  Total thickness was 21  mm.  We felt with the thinness of the native patella and the overall god quality of the cartilage, worst on the medial facet which was aggravated by her varus deformity, we decided to leave the native patella cartilage.     We then removed the trial prosthesis, brought the permanent prosthesis in the room, pulse lavaged the knee with antibiotic irrigation.  Cemented all components at once in the following order; tibia, femur.  Excess cement was removed both during and after the hardening process.  For the bulk of the hardening process, the leg was held in full extension with a trial insert in place.  Once we verified that the trial insert was the   correct size, we put the permanent prosthesis in place.      We then pulse lavaged the knee again with antibiotic irrigation.  A drain was placed deep to the wound.  The subvastus and superficial vastus fascia was  closed with a running #1 Vicryl, 2-0 Ethibond sutures followed by #1 Vicryl, closed the extensor mechanism proper.  Subcutaneous tissue was closed with 2-0 Vicryl.  The skin was closed with a running Monocryl.  Pirneo wound closure system, sterile dressing and a Tubigrip stockinette was put in place.      Tourniquet was let down at the end of the case.  Total tourniquet time was 90 minutes.  The patient tolerated the procedure well and was taken to the recovery room in satisfactory condition.     The patient will be maintained weightbearing as tolerated.  The patient will use a Active Ice and a CPM postoperatively.     At three distinct times during the case the knee was injected with a pre-mixed cocktail of epimorphine, toradol, and ropivicaine.  This was done in the posterior knee before cementing, in the retinacular tissues while the cement was  hardening, and in the subcutaneious space prior to closure.    TORI LLOYD MD          3/28/2019     Nisha Perez   MRN# [id number]                             YOB: 1955

## 2019-03-28 NOTE — OR NURSING
PACU to Inpatient Nursing Handoff    Patient Nisha Perez is a 63 year old female who speaks English.   Procedure Procedure(s):  Right Total Knee Arthroplasty   Surgeon(s) Primary: Monie Dai MD  Resident - Assisting: Asael Batista MD     Allergies   Allergen Reactions     Latex Itching     burning     Latex      PN: Converted from LW Latex Sensitivity Flag       Isolation  [unfilled]     Past Medical History   has a past medical history of Anxiety disorder, Gastro-oesophageal reflux disease, Injury of knee, left, Insomnia, Osteoarthritis, and Osteoporosis. She also has no past medical history of Malignant hyperthermia or PONV (postoperative nausea and vomiting).    Anesthesia Spinal   Dermatome Level Dermatomes Left: S1  Dermatomes Right: S1   Preop Meds acetaminophen (Tylenol) - time given: 975mg @0628  celecoxib (Celebrex) - time given: 200mg @0628  gabapentin (Neurontin) - time given: 300mg @0628   Nerve block Not applicable   Intraop Meds dexamethasone (Decadron)  fentanyl (Sublimaze): 75 mcg total  hydromorphone (Dilaudid): 1 mg total  ondansetron (Zofran): last given at 0955  tranxemic acid @ 0813   Local Meds Yes - Local Cocktail (morphine, ropivacaine, epinephrine, Toradol)   Antibiotics cefazolin (Ancef) - last given at 0951     Pain Patient Currently in Pain: yes  Comfort: tolerable with discomfort  Pain Control: partially effective   PACU meds  fentanyl (Sublimaze): 75 mcg (total dose) last given at 1140   hydromorphone (Dilaudid): 1 mg (total dose) last given at 1104   ketorolac (Toradol): 15 mg last given at 1155   PCA / epidural No   Capnography     Telemetry ECG Rhythm: Normal sinus rhythm   Inpatient Telemetry Monitor Ordered? No        Labs Glucose Lab Results   Component Value Date     03/28/2019       Hgb Lab Results   Component Value Date    HGB 14.1 03/11/2019       INR No results found for: INR   PACU Imaging Not applicable     Wound/Incision Incision/Surgical  Site Left Knee (Active)   Number of days:        Incision/Surgical Site 03/28/19 Right Knee (Active)   Incision Assessment UTV 3/28/2019 11:00 AM   Closure Liquid bandage 3/28/2019 11:00 AM   Incision Drainage Amount None 3/28/2019 11:00 AM   Dressing Intervention Clean, dry, intact 3/28/2019 11:00 AM   Number of days: 0      CMS        Equipment ice pack and Pt needs knee imbolizer on continuously/must be on for up and out of bed and can be open when in bed. Please place when patient arrives upstairs. NO CPM.   Other LDA       IV Access Peripheral IV 07/21/11 Left Hand (Active)   Number of days: 2807       Peripheral IV 03/28/19 Right Hand (Active)   Site Assessment WDL 3/28/2019 10:31 AM   Line Status Infusing 3/28/2019 10:31 AM   Phlebitis Scale 0-->no symptoms 3/28/2019 10:31 AM   Number of days: 0      Blood Products Not applicable EBL 25 mL   Intake/Output Date 03/28/19 0700 - 03/29/19 0659   Shift 5795-8634 4365-6499 5681-6212 24 Hour Total   INTAKE   I.V. 1350   1350   Shift Total(mL/kg) 1350(23.4)   1350(23.4)   OUTPUT   Urine 400   400   Blood 25   25   Shift Total(mL/kg) 425(7.37)   425(7.37)   Weight (kg) 57.7 57.7 57.7 57.7      Drains / Smith Closed/Suction Drain Left Knee Accordion 10 Armenian (Active)   Number of days: 2807       Closed/Suction Drain 1 Right Knee Accordion 10 Armenian (Active)   Site Description UTV 3/28/2019 10:31 AM   Dressing Status Normal: Clean, Dry & Intact 3/28/2019 10:31 AM   Drainage Appearance Bloody/Bright Red 3/28/2019 10:31 AM   Status Other (Comment) 3/28/2019 10:31 AM   Number of days: 0      Time of void PreOp Void Prior to Procedure: 0615 (03/28/19 0638)    PostOp Voided (mL): 400 mL(voided on bedpan freely.) (03/28/19 1145)    Diapered? No   Bladder Scan     PO    ice chips     Vitals    B/P: 126/77  T: 97.6  F (36.4  C)    Temp src: Axillary  P:  Pulse: 79 (03/28/19 1115)    Heart Rate: 78 (03/28/19 1115)     R: 14  O2:  SpO2: 99 %    O2 Device: (declines nasal canula  due to discomfort.) (03/28/19 1100)    Oxygen Delivery: 7 LPM (03/28/19 1031)         Family/support present Alan, .   Patient belongings     Patient transported on bed   DC meds/scripts (obs/outpt) Not applicable   Inpatient Pain Meds Released? Yes       Special needs/considerations needs knee immobilizer on.   Tasks needing completion Place knee immobilizer.       Tracy Colón RN  [facility] [id number]

## 2019-03-28 NOTE — ANESTHESIA POSTPROCEDURE EVALUATION
Anesthesia POST Procedure Evaluation    Patient: Nisha Perez   MRN:     [id number] Gender:   female   Age:    63 year old :      1955        Preoperative Diagnosis: Arthritis Of Right Knee   Procedure(s):  Right Total Knee Arthroplasty   Postop Comments: No value filed.       Anesthesia Type:  Regional, MAC    Reportable Event: NO     PAIN: Uncomplicated   Sign Out status: Comfortable, Well controlled pain     PONV: No PONV   Sign Out status:  No Nausea or Vomiting     Neuro/Psych: Uneventful perioperative course   Sign Out Status: Preoperative baseline; Age appropriate mentation     Airway/Resp.: Uneventful perioperative course   Sign Out Status: Non labored breathing, age appropriate RR; Resp. Status within EXPECTED Parameters     CV: Uneventful perioperative course   Sign Out status: Appropriate BP and perfusion indices; Appropriate HR/Rhythm     Disposition:   Sign Out in:  PACU  Disposition:  Floor  Recovery Course: Uneventful  Follow-Up: Not required     Comments/Narrative:  Pt doing well.  No pain.  Pt may go  To the floor.           Last Anesthesia Record Vitals:  CRNA VITALS  3/28/2019 0959 - 3/28/2019 1059      3/28/2019             Resp Rate (observed):  16          Last PACU/Preop Vitals:  Vitals:    19 1200 19 1215 19 1230   BP: 92/40 127/82 129/77   Pulse: 78 83 75   Resp: 17 13 8   Temp: 36.4  C (97.6  F)     SpO2: 100% 98% 95%         Electronically Signed By: Cha Fam MD, 2019, 12:57 PM

## 2019-03-28 NOTE — ANESTHESIA PROCEDURE NOTES
Spinal/LP Procedure Note    Spinal Block  Staff:     Anesthesiologist:  Cha Fam MD    Resident/CRNA:  Jose E Mar MD    Spinal/LP performed by resident/CRNA in presence of a teaching physician.    Location: OR  Procedure Start/Stop Times:      3/28/2019 7:40 AM     3/28/2019 7:45 AM    patient identified, IV checked, site marked, risks and benefits discussed, informed consent, monitors and equipment checked, pre-op evaluation, at physician/surgeon's request and post-op pain management      Correct Patient: Yes      Correct Position: Yes      Correct Site: Yes      Correct Procedure: Yes      Correct Laterality:  Yes    Site Marked:  Yes  Procedure:     Procedure:  Intrathecal    ASA:  2    Position:  Sitting    Sterile Prep: chloraprep and sterile gloves      Insertion site:  L3-4    Approach:  Midline    Needle Type:  Mckenna    Needle gauge (G):  25    Local Skin Infiltration:  1% lidocaine    amount (ml):  2    Needle Length (in):  3.5    Introducer used: Yes      Introducer gauge:  20 G    Attempts:  2    Redirects:  3    CSF:  Clear    Paresthesias:  No    Time injected:  07:45  Assessment/Narrative:     Sensory Level:  T5 and T6

## 2019-03-28 NOTE — BRIEF OP NOTE
[facility], [place]    Brief Operative Note    Pre-operative diagnosis: Arthritis Of Right Knee  Post-operative diagnosis Right knee medial and patellofemoral DJD  Procedure: Procedure(s):  Right Total Knee Arthroplasty  Surgeon: Surgeon(s) and Role:     * Monie Dai MD - Primary     * Asael Batista MD - Resident - Assisting  Anesthesia: Spinal   Estimated blood loss: Less than 50 ml  Drains: Hemovac  Specimens: Bone fragments  Findings:   None.  Complications: None.  Implants: Materials Involved:  Metallic and poly  Grafts:  None.

## 2019-03-28 NOTE — PLAN OF CARE
Pt arrived to unit at 1300 and was oriented to room and call light  VS: VSS   O2: >90% on RA   Output: Adequate; first PVR 17   Last BM: 3/27   Activity: WBAt; 1A with FWW. Worked with PT this afternoon   Up for meals? Yes   Skin: Incision R knee; HV   Pain: 5mg oxycodone   CMS: Intact after spinal wore off   Dressing: CDI   Diet: Regular   LDA: PIV infusing; HV patent   Equipment: PCDs, CPM, IV pole, capnography, FWW, gait belt, BSC   Plan: TBD   Additional Info: Pt received spinal anesthesia + cocktail. EBL 25.

## 2019-03-28 NOTE — CONSULTS
HOSPITALIST CONSULTATION     REQUESTING PHYSICIAN: Monie Dai MD    REASON FOR CONSULTATION: Evaluation, Recommendations and co management of Medical Comorbidities.     ASSESSMENT & PLAN :     Nisha Perez is a 63 year old female admitted on 3/28/2019 following procedure, s/p R TKA> for OA. By Dr Dai. No complication reported.     Currently doing well  Pain controlled.   No acute medical concern.     No significant PMH- no heart or lung problem.     # GERD: continue PPI  # Insomnia: continue Ambien    # Orthopedics: POD # 0  Hemodynamics: stable.   continue on IV fluids, until adequate PO.    Monitor hgb for anemia of acute blood loss. Transfuse for hgb <7.0    Analgesia: Per Orthopedic team.   DVT prophylaxis:- Per orthopedic team  Activity per orthopedic team.   Antibiotics and wound care as per primary team.   Encourage Incentive spirometry to prevent atelectasis   Minimize use of narcotics as able. Consider bowel regimen with narcotics.       Thank you for letting us get involved in care of Ms Perez.   Please page with any questions.      Doni Brandon MD   Hospitalist (Internal Medicine)  [facility]  Pager: [phone].     CHIEF COMPLAINT: Post Op. Doing well.     HISTORY OF PRESENT ILLNESS: Obtained from the patient and chart review including Pre op evaluation, procedure note.    63 year old year old female  with above discussed medical problems s/p above procedure admitted on 3/28/2019  for post op care and monitoring  (for further details for indication of surgery and operative note, please refer to Monie Dai MD note). Medicine consulted to evaluate, recommend and/or co manage medical co morbidities.   No documented hypotension, hypoxemia or other significant complications intra or post operative.   Currently: Incisional Pain controlled. Denies any chest pain, shortness of breath or LH or palpitations. Denies any nausea, vomiting or pain abdomen. No  fever or chills. Denies any dysuria.  Denies any new rash.   Medical issues as discussed above.   Denies any other medical concern.     PAST MEDICAL HISTORY:   Past Medical History:   Diagnosis Date     Anxiety disorder      Gastro-oesophageal reflux disease     esophagitis     Injury of knee, left      Insomnia      Osteoarthritis      Osteoporosis        PAST SURGICAL HISTORY:   Past Surgical History:   Procedure Laterality Date     APPENDECTOMY       ARTHRODESIS ANKLE      left     ARTHROPLASTY PATELLO-FEMORAL (KNEE)  2011    Procedure:ARTHROPLASTY PATELLO-FEMORAL (KNEE); Left Knee [place] Arthrolplasty Prosthesis, Removal of hardware left knee; Surgeon:TORI LLOYD; Location:[facility]     ARTHROSCOPY KNEE      left     ARTHROSCOPY KNEE      right      SECTION      X 4     TONSILLECTOMY         FH: reviewed.     Family History   Problem Relation Age of Onset     Colon Cancer Mother      Diabetes No family hx of      Coronary Artery Disease No family hx of      Hypertension No family hx of      Hyperlipidemia No family hx of      Cerebrovascular Disease No family hx of      Breast Cancer No family hx of      Anxiety Disorder No family hx of      Osteoporosis No family hx of      Obesity No family hx of      Depression No family hx of      Prostate Cancer No family hx of      Other Cancer No family hx of      Mental Illness No family hx of      Substance Abuse No family hx of      Asthma No family hx of      Genetic Disorder No family hx of      Thyroid Disease No family hx of      Anesthesia Reaction No family hx of         SH: reviewed.     Social History     Socioeconomic History     Marital status:      Spouse name: None     Number of children: None     Years of education: None     Highest education level: None   Occupational History     None   Social Needs     Financial resource strain: None     Food insecurity:     Worry: None     Inability: None     Transportation needs:     Medical: None      Non-medical: None   Tobacco Use     Smoking status: Never Smoker     Smokeless tobacco: Never Used   Substance and Sexual Activity     Alcohol use: Yes     Comment: 4-6 drinks per week     Drug use: No     Sexual activity: Not Currently     Partners: Male   Lifestyle     Physical activity:     Days per week: None     Minutes per session: None     Stress: None   Relationships     Social connections:     Talks on phone: None     Gets together: None     Attends Gnosticism service: None     Active member of club or organization: None     Attends meetings of clubs or organizations: None     Relationship status: None     Intimate partner violence:     Fear of current or ex partner: None     Emotionally abused: None     Physically abused: None     Forced sexual activity: None   Other Topics Concern     Parent/sibling w/ CABG, MI or angioplasty before 65F 55M? Not Asked   Social History Narrative     None       ALLERGIES:   Allergies   Allergen Reactions     Latex Itching     burning     Latex      PN: Converted from LW Latex Sensitivity Flag         HOME MEDICATIONS:     Prior to Admission medications    Medication Sig Start Date End Date Taking? Authorizing Provider   dexlansoprazole (DEXILANT) 60 MG CPDR CR capsule Take 1 capsule (60 mg) by mouth daily 3/11/19  Yes Emerson Spear PA-C   diphenhydrAMINE (BENADRYL) 25 MG tablet Take 25 mg by mouth every 6 hours as needed.   Yes Reported, Patient   fluticasone (FLONASE) 50 MCG/ACT nasal spray Spray 1 spray into both nostrils 2 times daily   Yes Reported, Patient   Pseudoephedrine HCl (SUDAFED PO) Take  by mouth at onset of headache.   Yes Reported, Patient   zolpidem ER (AMBIEN CR) 6.25 MG CR tablet Take 6.25 mg by mouth nightly as needed for sleep   Yes Reported, Patient       CURRENT MEDICATIONS:    Current Facility-Administered Medications   Medication     [START ON 3/31/2019] acetaminophen (TYLENOL) tablet 650 mg     acetaminophen (TYLENOL) tablet 975 mg      "alum & mag hydroxide-simethicone (MYLANTA ES/MAALOX  ES) suspension 30 mL     aspirin (ASA) EC tablet 325 mg     benzocaine-menthol (CEPACOL) 15-3.6 MG lozenge 1-2 lozenge     [START ON 3/29/2019] bisacodyl (DULCOLAX) Suppository 10 mg     ceFAZolin (ANCEF) 1 g vial to attach to  ml bag for ADULT or 50 ml bag for PEDS     dexlansoprazole (DEXILANT) CR capsule 60 mg     diphenhydrAMINE (BENADRYL) capsule 25 mg    Or     diphenhydrAMINE (BENADRYL) injection 25 mg     fluticasone (FLONASE) 50 MCG/ACT spray 1 spray     HYDROmorphone (PF) (DILAUDID) injection 0.3-0.5 mg     [START ON 3/29/2019] influenza recomb quadrivalent PF (FLUBLOK) injection 0.5 mL     ketorolac (TORADOL) injection 15 mg     lactated ringers infusion     lidocaine (LMX4) cream     lidocaine 1 % 1 mL     naloxone (NARCAN) injection 0.1-0.4 mg     naloxone (NARCAN) injection 0.1-0.4 mg     ondansetron (ZOFRAN-ODT) ODT tab 4 mg    Or     ondansetron (ZOFRAN) injection 4 mg     oxyCODONE (ROXICODONE) tablet 5-10 mg     pseudoePHEDrine (SUDAFED) tablet 30 mg     senna-docusate (SENOKOT-S/PERICOLACE) 8.6-50 MG per tablet 1 tablet    Or     senna-docusate (SENOKOT-S/PERICOLACE) 8.6-50 MG per tablet 2 tablet     sodium chloride (PF) 0.9% PF flush 3 mL     sodium chloride (PF) 0.9% PF flush 3 mL     zolpidem ER (AMBIEN CR) CR tablet 6.25 mg         ROS: 10 point ROS neg other than the symptoms noted above in the HPI.    PHYSICAL EXAMINATION:     /77   Pulse 75   Temp 97.6  F (36.4  C) (Axillary)   Resp 8   Ht 1.448 m (4' 9.01\")   Wt 57.7 kg (127 lb 3.3 oz)   SpO2 95%   BMI 27.52 kg/m    Temp (24hrs), Av.8  F (36.6  C), Min:97.6  F (36.4  C), Max:98.4  F (36.9  C)      BMI= Body mass index is 27.52 kg/m .      Intake/Output Summary (Last 24 hours) at 3/28/2019 1342  Last data filed at 3/28/2019 1230  Gross per 24 hour   Intake 1400 ml   Output 465 ml   Net 935 ml       General: Alert, interactive, NAD.   HEENT: AT/NC. MONA. " Anicteric.Moist MM.    Neck: Supple. No JVD. No Lymphadenopathy.  Heart/CVS: Normal S1 and S2. Regular. No m/r/g .   Chest/Respi: Non labored breathing. CTA BL.   Abdomen/GI: Soft, non distended, non tender.   Extremities/MSK: Distal pulses 2+, well perfused. Rest per ortho.   Neuro: Alert and oriented x4. Cranial nerves II-XII are grossly intact.    Skin:  No new rash over exposed areas.       LABORATORY DATA: reviewed.     Recent Results (from the past 24 hour(s))   UA with Microscopic reflex to Culture    Collection Time: 03/28/19  6:15 AM   Result Value Ref Range    Color Urine Straw     Appearance Urine Clear     Glucose Urine Negative NEG^Negative mg/dL    Bilirubin Urine Negative NEG^Negative    Ketones Urine Negative NEG^Negative mg/dL    Specific Gravity Urine 1.000 (L) 1.003 - 1.035    Blood Urine Negative NEG^Negative    pH Urine 7.0 5.0 - 7.0 pH    Protein Albumin Urine Negative NEG^Negative mg/dL    Urobilinogen mg/dL Normal 0.0 - 2.0 mg/dL    Nitrite Urine Negative NEG^Negative    Leukocyte Esterase Urine Negative NEG^Negative    Source Midstream Urine     WBC Urine <1 0 - 5 /HPF    RBC Urine <1 0 - 2 /HPF   ABO/Rh type and screen    Collection Time: 03/28/19  6:27 AM   Result Value Ref Range    ABO A     RH(D) Pos     Antibody Screen Neg     Test Valid Only At          [facility]    Specimen Expires 03/31/2019    Glucose    Collection Time: 03/28/19  6:27 AM   Result Value Ref Range    Glucose 105 (H) 70 - 99 mg/dL       No results found for this or any previous visit (from the past 24 hour(s)).        Doni Brandon MD

## 2019-03-28 NOTE — BRIEF OP NOTE
Orthopaedic Surgery Brief Op-Note      Patient: Nisha Perez  : 1955  Date of Service: 3/28/2019 10:33 AM    Pre-operative Diagnosis: Arthritis Of Right Knee  Post-operative Diagnosis: same    Procedure(s) Performed: Procedure(s):  Right Total Knee Arthroplasty    Staff: Dr. Dai  Assistants:   Asael Batista MD    Anesthesia: General  EBL: 25 cc  UOP: see anesthesia record  Tourniquet Time: 90 minutes at 250 mmHg    Implants:   Implant Name Type Inv. Item Serial No.  Lot No. LRB No. Used   BONE CEMENT RADIOPAQUE SIMPLEX P SPEEDSET 6192-1-001 Cement, Bone BONE CEMENT RADIOPAQUE SIMPLEX P SPEEDSET 6192-1-001  [employer] [id number] Right 1   IMP BASEPLATE TIBIAL HOWM TRI 2 5520-B-200 Total Joint Component/Insert IMP BASEPLATE TIBIAL HOWM TRI 2 5520-B-200  [employer] [id number] Right 1   IMP COMP FEM STRK TRIATHLN CR RT 2 5510-F-202 Total Joint Component/Insert IMP COMP FEM STRK TRIATHLN CR RT 2 5510-F-202  [employer] [id number] Right 1   IMP INSERT TIBIAL HOWM TRI 6X09MM 5531-G-209 Total Joint Component/Insert IMP INSERT TIBIAL HOWM TRI 6X09MM 5531-G-209  [employer] [id number] Right 1     Drains: hemovac  Intra-op Labs/Cxs/Specimens: None  Complications: No apparent complications during procedure  Findings: Please see dictated operative note for details    Disposition: Stable to PACU, then admit to Orthopaedics.    Post-Op Plan:  Assessment/Plan: Nisha Perez is a 63 year old female s/p Procedure(s):  Right Total Knee Arthroplasty on 3/28/2019 with Dr. Dai.    Activity: Up with assist and assistive devices as needed until independent. Knee ROM as tolerated. Advance CPM as tolerated to goal of 0 to 90 degrees.  Weight bearing status: WBAT    Antibiotics: Cefazolin x 24 hours  Diet: Begin with clear fluids and progress diet as tolerated. Bowel regimen. Anti-emetics PRN.    DVT prophylaxis: Aspirin 325mg daily x 4 weeks  Elevation: Elevate heels off of bed on  pillows, no pillows behind the knee at any time    Wound Care: Dressing change at bedside by Ortho on POD #1  Drains: Document output per shift, Ortho will discontinue on POD #1-2.    Pain management: Orals PRN, IV for breakthrough only  X-rays: AP/Lat operative knee XR on POD #1 or immediately before discharge, whichever comes first  Physical Therapy: Mobilization, ROM, ADL's  Occupational Therapy: ADL's  Labs: Trend Hgb on PODs #1 & 2  Cultures: None  Consults: PT, OT. Hospitalist, appreciate assistance in caring for this patient throughout the perioperative period    Future Appointments   Date Time Provider Department Center   3/29/2019 10:15 AM Pretty Cook, PT [facility] [place]   3/29/2019  1:00 PM Tania Lopez, OT [facility] [place]   3/29/2019  2:15 PM Geno Dhaliwal, PT [facility] [place]   5/2/2019  9:00 AM Nurse, [facility] Orth[facility]   5/10/2019 10:30 AM Mehran Salgado MD [facility]   5/10/2019 12:00 PM Saira Steele, OT [facility]   5/28/2019 11:45 AM Monie Dai MD [facility]       Disposition: Pending progress with therapies, pain control on orals, and medical stability, anticipate discharge to Home vs TCU on POD #1-2.    Asael Batista  Orthopaedic PGY4  Pager: [phone]

## 2019-03-28 NOTE — PROGRESS NOTES
03/28/19 1546   Quick Adds   Type of Visit Initial PT Evaluation       Present no   Language English   Living Environment   Lives With spouse   Living Arrangements house   Home Accessibility stairs to enter home   Number of Stairs, Main Entrance 3  (very small platform steps)   Stair Railings, Main Entrance none   Transportation Anticipated family or friend will provide   Self-Care   Usual Activity Tolerance good   Current Activity Tolerance moderate   Regular Exercise Yes   Activity/Exercise Type strength training   Exercise Amount/Frequency 3-5 times/wk   Equipment Currently Used at Home none  (has crutches and walker)   Functional Level Prior   Ambulation 0-->independent   Transferring 0-->independent   Toileting 0-->independent   Bathing 0-->independent   Communication 0-->understands/communicates without difficulty   Swallowing 0-->swallows foods/liquids without difficulty   Cognition 0 - no cognition issues reported   Fall history within last six months no   Which of the above functional risks had a recent onset or change? ambulation;transferring   General Information   Onset of Illness/Injury or Date of Surgery - Date 03/28/19   Referring Physician Monie Dai MD   Patient/Family Goals Statement Get back to activities, play with grandchildren   Pertinent History of Current Problem (include personal factors and/or comorbidities that impact the POC) s/p R TKA   Precautions/Limitations fall precautions   Weight-Bearing Status - LUE full weight-bearing   Weight-Bearing Status - RUE full weight-bearing   Weight-Bearing Status - LLE full weight-bearing   Weight-Bearing Status - RLE weight-bearing as tolerated   General Observations Supine in bed upon arrival, pleasant and agreeable   General Info Comments IV, hemovac, capno   Cognitive Status Examination   Orientation orientation to person, place and time   Level of Consciousness alert   Follows Commands and Answers Questions 100% of  the time;able to follow multistep instructions   Personal Safety and Judgment intact   Memory intact   Pain Assessment   Patient Currently in Pain Yes, see Vital Sign flowsheet   Integumentary/Edema   Integumentary/Edema Comments Patient with normal post-surgical swelling present   Posture    Posture Forward head position;Protracted shoulders;Kyphosis   Posture Comments Mild sitting EOB and standing   Range of Motion (ROM)   ROM Comment R knee AAROM=0-5-93   Strength   Strength Comments Did not formally assess, able to demonstrate independent SLR on R   Bed Mobility   Bed Mobility Comments Completes supine<>sit transfer with SBA   Transfer Skills   Transfer Comments Completes sit<>stand transfer with SBA and use of walker   Gait   Gait Comments Tolerated ambulating a good distance in the bazan with SBA and use of walker, steady throughout   Balance   Balance Comments Independent sitting balance, SBA for standing balance with UE support from walker   Sensory Examination   Sensory Perception no deficits were identified   General Therapy Interventions   Planned Therapy Interventions balance training;bed mobility training;gait training;ROM;strengthening;transfer training;risk factor education;home program guidelines;progressive activity/exercise   Clinical Impression   Criteria for Skilled Therapeutic Intervention yes, treatment indicated   PT Diagnosis impaired functional mobility   Influenced by the following impairments increased post-op pain, decreased R LE strength and ROM   Functional limitations due to impairments impaired bed mobility, transfers and ambulation   Clinical Presentation Stable/Uncomplicated   Clinical Presentation Rationale s/p R TKA, mobilizing well   Clinical Decision Making (Complexity) Low complexity   Therapy Frequency` 2 times/day   Predicted Duration of Therapy Intervention (days/wks) 3 days   Anticipated Equipment Needs at Discharge (has walker and crutches)   Anticipated Discharge  "Disposition Home with Assist;Home with Home Therapy  (Eventual transition to OP PT)   Risk & Benefits of therapy have been explained Yes   Patient, Family & other staff in agreement with plan of care Yes   [facility] TM \"6 Clicks\"   2016, Trustees of [facility], under license to [employer].  All rights reserved.   6 Clicks Short Forms Basic Mobility Inpatient Short Form   [facility]  \"6 Clicks\" V.2 Basic Mobility Inpatient Short Form   1. Turning from your back to your side while in a flat bed without using bedrails? 4 - None   2. Moving from lying on your back to sitting on the side of a flat bed without using bedrails? 4 - None   3. Moving to and from a bed to a chair (including a wheelchair)? 4 - None   4. Standing up from a chair using your arms (e.g., wheelchair, or bedside chair)? 4 - None   5. To walk in hospital room? 4 - None   6. Climbing 3-5 steps with a railing? 3 - A Little   Basic Mobility Raw Score (Score out of 24.Lower scores equate to lower levels of function) 23   Total Evaluation Time   Total Evaluation Time (Minutes) 8     "

## 2019-03-28 NOTE — LETTER
Transition Communication Hand-off for Care Transitions to Next Level of Care Provider    Name: Nisha Perez  : 1955  MRN #: [id number]  Primary Care Provider: Emerson Spear     Primary Clinic: [address] [place] [id number]     Reason for Hospitalization:  Arthritis Of Right Knee  Status post knee surgery  Admit Date/Time: 3/28/2019  5:43 AM  Discharge Date: 3/30/2019  Payor Source: Payor: [employer] / Plan: [employer] INDIVIDUAL FAMILY PLANS WITH [employer] / Product Type: HMO /          Reason for Communication Hand-off Referral: Avoidable readmission within 30 days    Discharge Plan: Home with home care.  [facility]  Phone  [phone]  Fax  [phone]     Discharge Needs Assessment:  Needs      Most Recent Value   Equipment Currently Used at Home  raised toilet [has crutches, walker]        Follow-up specialty is recommended: Yes    Follow-up plan:    Future Appointments   Date Time Provider Department Center   3/30/2019 11:00 AM Laura Friend, PT [facility] [place]   3/30/2019  1:30 PM Laura Friend, PT [facility] [place]   2019  9:00 AM Nurse, [facility]   5/10/2019 10:30 AM Mehran Salgado MD [facility]   5/10/2019 12:00 PM Saira Steele OT [facility]   2019 11:45 AM Monie Dai MD [facility]       Any outstanding tests or procedures:        Referrals     Future Labs/Procedures    Home care nursing referral     Comments:    [facility]  Phone  [phone]  Fax  [phone]     RN skilled nursing visit.   RN to assess vital signs and weight, respiratory and cardiac status, pain level and activity tolerance, hydration, nutrition and bowel status   RN to complete home safety evaluation.  RN to provide lab draws as needed and communicate results to PCP      Physical Therapy to evaluate and treat    Your provider has ordered home care nursing services. If you have not been contacted within 2 days of your discharge please call the  inpatient department phone number at [phone] .            Shavonne Valente RN, BSN  Care Coordinator, 8A  Phone [phone]  Pager [phone]      AVS/Discharge Summary is the source of truth; this is a helpful guide for improved communication of patient story

## 2019-03-28 NOTE — PLAN OF CARE
Discharge Planner PT   Patient plan for discharge: Home with assist and home PT  Current status: PT evaluation complete and treatment initiated. Overall doing very well. Completes supine<>sit transfer with SBA. Sits EOB with good tolerance. Completes sit<>stand transfer with SBA and use of walker. Able to complete toilet transfer, independent with hygiene cares. Ambulated in bazan SBA using walker, safe throughout utilizing step through gait pattern. R knee AAROM=0-5-93. Ended in bed with needs in reach.  Barriers to return to prior living situation: safety, independence, pain, decreased R LE strength and ROM, stairs  Recommendations for discharge: Home with assist and home PT on POD #1 vs #2.  Rationale for recommendations: For safety evaluation and progression per TKA protocol. Quick progression from home PT to OP PT.       Entered by: Michaela Eagle 03/28/2019 4:00 PM

## 2019-03-29 ENCOUNTER — APPOINTMENT (OUTPATIENT)
Dept: PHYSICAL THERAPY | Facility: CLINIC | Age: 64
DRG: 470 | End: 2019-03-29
Attending: ORTHOPAEDIC SURGERY
Payer: COMMERCIAL

## 2019-03-29 ENCOUNTER — APPOINTMENT (OUTPATIENT)
Dept: GENERAL RADIOLOGY | Facility: CLINIC | Age: 64
DRG: 470 | End: 2019-03-29
Attending: ORTHOPAEDIC SURGERY
Payer: COMMERCIAL

## 2019-03-29 ENCOUNTER — APPOINTMENT (OUTPATIENT)
Dept: OCCUPATIONAL THERAPY | Facility: CLINIC | Age: 64
DRG: 470 | End: 2019-03-29
Attending: ORTHOPAEDIC SURGERY
Payer: COMMERCIAL

## 2019-03-29 LAB — HGB BLD-MCNC: 9.4 G/DL (ref 11.7–15.7)

## 2019-03-29 PROCEDURE — 25000131 ZZH RX MED GY IP 250 OP 636 PS 637: Performed by: STUDENT IN AN ORGANIZED HEALTH CARE EDUCATION/TRAINING PROGRAM

## 2019-03-29 PROCEDURE — 25000128 H RX IP 250 OP 636: Performed by: STUDENT IN AN ORGANIZED HEALTH CARE EDUCATION/TRAINING PROGRAM

## 2019-03-29 PROCEDURE — 36415 COLL VENOUS BLD VENIPUNCTURE: CPT | Performed by: ORTHOPAEDIC SURGERY

## 2019-03-29 PROCEDURE — 25000132 ZZH RX MED GY IP 250 OP 250 PS 637: Performed by: STUDENT IN AN ORGANIZED HEALTH CARE EDUCATION/TRAINING PROGRAM

## 2019-03-29 PROCEDURE — 25000128 H RX IP 250 OP 636: Performed by: INTERNAL MEDICINE

## 2019-03-29 PROCEDURE — 97116 GAIT TRAINING THERAPY: CPT | Mod: GP | Performed by: PHYSICAL THERAPIST

## 2019-03-29 PROCEDURE — 99232 SBSQ HOSP IP/OBS MODERATE 35: CPT | Performed by: INTERNAL MEDICINE

## 2019-03-29 PROCEDURE — 73560 X-RAY EXAM OF KNEE 1 OR 2: CPT | Mod: RT

## 2019-03-29 PROCEDURE — 12000001 ZZH R&B MED SURG/OB UMMC

## 2019-03-29 PROCEDURE — 85018 HEMOGLOBIN: CPT | Performed by: ORTHOPAEDIC SURGERY

## 2019-03-29 PROCEDURE — 97535 SELF CARE MNGMENT TRAINING: CPT | Mod: GO | Performed by: OCCUPATIONAL THERAPIST

## 2019-03-29 PROCEDURE — 97165 OT EVAL LOW COMPLEX 30 MIN: CPT | Mod: GO | Performed by: OCCUPATIONAL THERAPIST

## 2019-03-29 PROCEDURE — 97110 THERAPEUTIC EXERCISES: CPT | Mod: GP | Performed by: PHYSICAL THERAPIST

## 2019-03-29 RX ORDER — ACETAMINOPHEN 325 MG/1
975 TABLET ORAL EVERY 8 HOURS
Qty: 100 TABLET | Refills: 0 | Status: SHIPPED | OUTPATIENT
Start: 2019-03-30

## 2019-03-29 RX ORDER — PROCHLORPERAZINE MALEATE 5 MG
5-10 TABLET ORAL EVERY 6 HOURS PRN
Status: DISCONTINUED | OUTPATIENT
Start: 2019-03-29 | End: 2019-03-30 | Stop reason: HOSPADM

## 2019-03-29 RX ORDER — OXYCODONE HYDROCHLORIDE 5 MG/1
5-10 TABLET ORAL EVERY 4 HOURS PRN
Qty: 40 TABLET | Refills: 0 | Status: SHIPPED | OUTPATIENT
Start: 2019-03-29 | End: 2019-03-30

## 2019-03-29 RX ORDER — AMOXICILLIN 250 MG
2 CAPSULE ORAL 2 TIMES DAILY
Qty: 120 TABLET | Refills: 0 | Status: SHIPPED | OUTPATIENT
Start: 2019-03-29 | End: 2019-05-09

## 2019-03-29 RX ORDER — ASPIRIN 325 MG
325 TABLET, DELAYED RELEASE (ENTERIC COATED) ORAL DAILY
Qty: 30 TABLET | Refills: 0 | Status: SHIPPED | OUTPATIENT
Start: 2019-03-30 | End: 2019-06-11

## 2019-03-29 RX ADMIN — ONDANSETRON 4 MG: 4 TABLET, ORALLY DISINTEGRATING ORAL at 09:51

## 2019-03-29 RX ADMIN — HYDROMORPHONE HYDROCHLORIDE 0.3 MG: 1 INJECTION, SOLUTION INTRAMUSCULAR; INTRAVENOUS; SUBCUTANEOUS at 21:24

## 2019-03-29 RX ADMIN — KETOROLAC TROMETHAMINE 15 MG: 30 INJECTION, SOLUTION INTRAMUSCULAR; INTRAVENOUS at 06:21

## 2019-03-29 RX ADMIN — PROCHLORPERAZINE EDISYLATE 5 MG: 5 INJECTION INTRAMUSCULAR; INTRAVENOUS at 20:53

## 2019-03-29 RX ADMIN — SENNOSIDES AND DOCUSATE SODIUM 2 TABLET: 8.6; 5 TABLET ORAL at 08:19

## 2019-03-29 RX ADMIN — ACETAMINOPHEN 975 MG: 325 TABLET, FILM COATED ORAL at 18:00

## 2019-03-29 RX ADMIN — OXYCODONE HYDROCHLORIDE 10 MG: 5 TABLET ORAL at 03:14

## 2019-03-29 RX ADMIN — OXYCODONE HYDROCHLORIDE 10 MG: 5 TABLET ORAL at 09:53

## 2019-03-29 RX ADMIN — DEXLANSOPRAZOLE 60 MG: 60 CAPSULE, DELAYED RELEASE ORAL at 08:19

## 2019-03-29 RX ADMIN — OXYCODONE HYDROCHLORIDE 5 MG: 5 TABLET ORAL at 06:21

## 2019-03-29 RX ADMIN — ACETAMINOPHEN 975 MG: 325 TABLET, FILM COATED ORAL at 08:18

## 2019-03-29 RX ADMIN — OXYCODONE HYDROCHLORIDE 10 MG: 5 TABLET ORAL at 19:20

## 2019-03-29 RX ADMIN — OXYCODONE HYDROCHLORIDE 10 MG: 5 TABLET ORAL at 13:08

## 2019-03-29 RX ADMIN — ONDANSETRON 4 MG: 4 TABLET, ORALLY DISINTEGRATING ORAL at 16:01

## 2019-03-29 RX ADMIN — ASPIRIN 325 MG: 325 TABLET, COATED ORAL at 08:19

## 2019-03-29 RX ADMIN — OXYCODONE HYDROCHLORIDE 5 MG: 5 TABLET ORAL at 16:01

## 2019-03-29 RX ADMIN — FLUTICASONE PROPIONATE 1 SPRAY: 50 SPRAY, METERED NASAL at 08:20

## 2019-03-29 RX ADMIN — KETOROLAC TROMETHAMINE 15 MG: 30 INJECTION, SOLUTION INTRAMUSCULAR; INTRAVENOUS at 11:58

## 2019-03-29 ASSESSMENT — ACTIVITIES OF DAILY LIVING (ADL)
ADLS_ACUITY_SCORE: 12

## 2019-03-29 NOTE — PLAN OF CARE
VS: Stable.   O2: RA while awake, 1/2 L via oxymask while asleep because pt desats to upper 80's, pt refused capno, cont pulse ox on.   Output: Voiding adequately in bathroom, PVR 0.   Last BM: 3/28/19.   Activity: SBA with walker, WBAT.   Skin: Incision, drain.   Pain: Aching pain in right knee managed with oxycodone, toradol, scheduled tylenol, and ice.   CMS: Intact.   Dressing: CDI.   Diet: Regular.   LDA: PIV right hand SL. Hemovac with bloody/red output, bag marked.   Equipment: IV pole, cont. pulse ox, CPM 0-50, refused capno, refused PCDs, pillows, call light within reach.   Plan: Continue to monitor.   Additional Info:

## 2019-03-29 NOTE — PLAN OF CARE
Discharge Planner OT   Patient plan for discharge: Home with assist  Current status: OT eval and tx completed. Pt educated on modified strategies for ADLs following surgery. Pt demonstrated (I) with LE dressing and SBA for tub/shower transfer simulated to home - educated on options for obtaining AE and pt verbalized understanding. Pt reported mod (I) with toileting/transfer. No concerns regarding discharge home.  Barriers to return to prior living situation: none  Recommendations for discharge: Home with assist  Rationale for recommendations: pt moving well post op; has met all IP OT goals       Entered by: Tania Lopez 03/29/2019 11:26 AM    Occupational Therapy Discharge Summary    Reason for therapy discharge:    All goals and outcomes met, no further needs identified.    Progress towards therapy goal(s). See goals on Care Plan in [facility] electronic health record for goal details.  Goals met    Therapy recommendation(s):    No further therapy is recommended.

## 2019-03-29 NOTE — PROGRESS NOTES
[facility]    Medicine Progress Note - Hospitalist Service       Date of Admission:  3/28/2019  Assessment & Plan     Nisha Perez is a 63 year old female admitted on 3/28/2019 following procedure, s/p R TKA> for OA. By Dr Dai. No complication reported.      Currently doing well. Pain controlled. Nausea+  No other medical concern.    No significant PMH     # GERD: continue PPI  # Insomnia: continue Ambien     # Orthopedics:   Hemodynamics: stable.   continue on IV fluids, until adequate PO.    Monitor hgb for anemia of acute blood loss. Transfuse for hgb <7.0     Analgesia: Per Orthopedic team.   DVT prophylaxis:- Per orthopedic team  Activity per orthopedic team.   Antibiotics and wound care as per primary team.   Encourage Incentive spirometry to prevent atelectasis   Minimize use of narcotics as able. Consider bowel regimen with narcotics.         Thank you for letting us get involved in care of Ms Perez.   Please page with any questions    Rest per ortho      The patient's care was discussed with the Bedside Nurse. Zac Brandon MD  Hospitalist Service  [facility]    ______________________________________________________________________    Interval History   States doing well  Denies fever or chills.   No cough or cp or sob.   No LH or dizziness.   Nausea+ no vomiting or pain abdomen.   No dysuria or bowel complaint.   No new sensory or motor complaint.   No new rash.    No other new or acute medical concern      Data reviewed today: I reviewed all medications, new labs and imaging results over the last 24 hours. I personally reviewed no images or EKG's today.    Physical Exam   Vital Signs: Temp: 98.2  F (36.8  C) Temp src: Oral BP: 116/67 Pulse: 76 Heart Rate: 79 Resp: 16 SpO2: 94 % O2 Device: None (Room air) Oxygen Delivery: 1/2 LPM  Weight: 127 lbs 3.29 oz     General: Alert, interactive, NAD.   HEENT: AT/NC.  Anicteric.Moist MM.    Heart/CVS: Normal S1 and S2.   Chest/Respi: Non labored breathing. CTA BL.   Abdomen/GI: Soft, non distended, non tender.   Extremities/MSK: Distal pulses 2+, well perfused. Rest per ortho.   Neuro: non focal.   Skin:  No new rash over exposed areas.         Data   Recent Labs   Lab 03/29/19  0608 03/28/19  0627   HGB 9.4*  --    GLC  --  105*     Recent Results (from the past 24 hour(s))   XR Knee Right 1/2 Views    Narrative    2 views right knee radiographs 3/29/2019 10:56 AM    History: s/p R TKA    Comparison: X-ray 2/5/2019    Findings:    AP and crosstable lateral views of the right knee were obtained.     Status post right total knee arthroplasty with surgical drain and  expected post surgical subcutaneous emphysema. The tibial and femoral  components are well-seated without evidence of periprosthetic  fracture. Mild mineralization around the knee. Moderate joint  effusion. Prominent soft tissue swelling anteriorly.      Impression    Impression: New post surgical changes of total knee arthroplasty.    I have personally reviewed the examination and initial interpretation  and I agree with the findings.    WENDY KRUGER     Medications     lactated ringers Stopped (03/29/19 0320)       acetaminophen  975 mg Oral Q8H     aspirin  325 mg Oral Daily     bisacodyl  10 mg Rectal Daily     dexlansoprazole  60 mg Oral Daily     fluticasone  1 spray Both Nostrils BID     influenza vaccine adult (product based on age)  0.5 mL Intramuscular Prior to discharge     senna-docusate  1 tablet Oral BID    Or     senna-docusate  2 tablet Oral BID     sodium chloride (PF)  3 mL Intracatheter Q8H

## 2019-03-29 NOTE — PROGRESS NOTES
"Orthopedic Surgery Progress Note    Subjective: NAEON. Pain well controlled. Feeling good this morning. Denies any numbness, tingling, motor weakness. No f/c, CP, SOB, or n/v.    Exam:  /53   Pulse 76   Temp 98.2  F (36.8  C)   Resp 16   Ht 1.448 m (4' 9.01\")   Wt 57.7 kg (127 lb 3.3 oz)   SpO2 98%   BMI 27.52 kg/m    Gen: Awake, alert, NAD  Resp: breathing equal and non-labored  Extremities:  RLE: Dressing c/d/i. 5/5 EHL/FHL/TA/Gsc. SILT in s/s/dp/sp/t distributions. 2+ DP and PT pulses. Toes WWP, brisk cap refill.    Drain: 40 / 255 / 75    Labs:  Recent Labs   Lab 03/29/19  0608   HGB 9.4*     Recent Labs   Lab 03/28/19  0627   *     No lab results found in last 7 days.    Imaging:  R knee: pending    Assessment:   Assessment/Plan: Nisha Perez is a 63 year old female s/p Procedure(s):  Right Total Knee Arthroplasty on 3/28/2019 with Dr. Dai.     Activity: Up with assist and assistive devices as needed until independent. Knee ROM as tolerated. Advance CPM as tolerated to goal of 0 to 90 degrees.  Weight bearing status: WBAT    Antibiotics: Cefazolin x 24 hours  Diet: Begin with clear fluids and progress diet as tolerated. Bowel regimen. Anti-emetics PRN.    DVT prophylaxis: Aspirin 325mg daily x 4 weeks  Elevation: Elevate heels off of bed on pillows, no pillows behind the knee at any time    Wound Care: Dressing change at bedside by Ortho on POD #1  Drains: Document output per shift, Ortho will discontinue on POD #1-2.    Pain management: Orals PRN, IV for breakthrough only  X-rays: AP/Lat operative knee XR on POD #1 or immediately before discharge, whichever comes first  Physical Therapy: Mobilization, ROM, ADL's  Occupational Therapy: ADL's  Labs: Trend Hgb on PODs #1 & 2  Cultures: None  Consults: PT, OT. Hospitalist, appreciate assistance in caring for this patient throughout the perioperative period  Disposition: Pending progress with therapies, pain control on orals, and medical " stability, anticipate discharge to Home today.    Future Appointments   Date Time Provider Department Center   3/29/2019  9:30 AM Tania Lopez, OT [facility] [place]   3/29/2019  2:00 PM Paulette Smith, PT [facility] [place]   5/2/2019  9:00 AM Nurse,  [facility]   5/10/2019 10:30 AM Mehran Salgado MD [facility]   5/10/2019 12:00 PM Saira Steele, OT [facility]   5/28/2019 11:45 AM Monie Dai MD [facility]        Asael Jacobson [facility]  Orthopaedic PGY4  [phone] (pager)

## 2019-03-29 NOTE — PLAN OF CARE
PT: CX- pt reports feeling too nauseous. States nausea starting soon after taking morning meds and has continued all day. Will reschedule for tomorrow morning.

## 2019-03-29 NOTE — PROGRESS NOTES
Care Coordinator Progress Note    Admission Date/Time:  3/28/2019  Attending MD:  Monie Dai MD    Data  Chart reviewed, discussed with interdisciplinary team.   Patient was admitted for: Status post knee surgery.    Concerns with insurance coverage for discharge needs: None.  Current Living Situation: Patient lives with spouse.  Support System: Supportive and Involved  Services Involved: Home Care  Transportation at Discharge: Family or friend will provide  Transportation to Medical Appointments:   - Name of caregiver: Alan,   Barriers to Discharge: NA    Coordination of Care and Referrals: Provided patient/family with options for Home Care.        Assessment  Met with patient at bedside to discuss discharge planning. A referral for skilled nursing and physical therapy was sent to [facility]. No further discharge concerns at this time. All therapy orders completed. RNCC available as needed.    [facility]  Phone  [phone]  Fax  [phone]     Plan  Anticipated Discharge Date:  3/30/2019  Anticipated Discharge Plan:  Home with home care.    Shavonne Valente RN, BSN  Care Coordinator, 8A  Phone [phone]  Pager [phone]

## 2019-03-29 NOTE — PROGRESS NOTES
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) and can assist as needed   Self-Care   Usual Activity Tolerance good   Current Activity Tolerance moderate   Regular Exercise Yes   Activity/Exercise Type strength training   Exercise Amount/Frequency 3-5 times/wk   Equipment Currently Used at Home raised toilet  (has crutches, walker)   Activity/Exercise/Self-Care Comment Pt was (I) with functional transfers and mobility with no AD   Functional Level   Ambulation 0-->independent   Transferring 0-->independent   Toileting 0-->independent   Bathing 0-->independent   Dressing 0-->independent   Eating 0-->independent   Communication 0-->understands/communicates without difficulty   Swallowing 0-->swallows foods/liquids without difficulty   Cognition 0 - no cognition issues reported   Fall history within last six months no   Which of the above functional risks had a recent onset or change? ambulation;transferring;toileting;bathing;dressing   Prior Functional Level Comment Pt was (I) with all ADL/IADLs including driving; pt is retired   General Information   Onset of Illness/Injury or Date of Surgery - Date 03/28/19   Referring Physician Monie Dai MD   Additional Occupational Profile Info/Pertinent History of Current Problem Pt is a 63 year old female admitted on 3/28/2019 following procedure, s/p R TKA> for OA   Weight-Bearing Status - LUE full weight-bearing   Weight-Bearing Status - RUE full weight-bearing   Weight-Bearing Status - LLE full weight-bearing   Weight-Bearing Status - RLE weight-bearing as tolerated   Cognitive Status Examination   Orientation orientation to person,  place and time   Level of Consciousness alert   Follows Commands (Cognition) WNL   Memory intact   Visual Perception   Visual Perception Wears glasses  (all the time)   Visual Perception Comments Pt reports new floaters in L eye starting a few weeks ago - planning to f/u with provider   Sensory Examination   Sensory Quick Adds Other (describe)   Sensory Comments has numbness in B hands from CTS   Pain Assessment   Patient Currently in Pain Yes, see Vital Sign flowsheet   Range of Motion (ROM)   ROM Comment BUEs WFL   Mobility   Bed Mobility Bed mobility skill: Sit to supine;Bed mobility skill: Scooting/Bridging   Bed Mobility Skill: Scooting/Bridging   Level of [place]: Scooting/Bridging independent   Bed Mobility Skill: Sit to Supine   Level of [place]: Sit/Supine stand-by assist   Transfer Skill: Sit to Stand   Level of [place]: Sit/Stand stand-by assist   Activities of Daily Living Analysis   Impairments Contributing to Impaired Activities of Daily Living balance impaired;pain;post surgical precautions   General Therapy Interventions   Planned Therapy Interventions ADL retraining;IADL retraining;bed mobility training;ROM;strengthening;stretching;transfer training;home program guidelines;progressive activity/exercise   Clinical Impression   Criteria for Skilled Therapeutic Interventions Met yes, treatment indicated   OT Diagnosis decreased ADL/IADL (I)   Influenced by the following impairments pain, surgical precautions, impaired balance   Assessment of Occupational Performance 3-5 Performance Deficits   Identified Performance Deficits functional transfers, bathing, toileting, dressing, household chores   Clinical Decision Making (Complexity) Low complexity   Therapy Frequency daily   Predicted Duration of Therapy Intervention (days/wks) 2 days   Anticipated Equipment Needs at Discharge shower chair   Anticipated Discharge Disposition Home with Assist   Risks and Benefits of Treatment have been  "explained. Yes   Patient, Family & other staff in agreement with plan of care Yes   [facility] AM-PAC TM \"6 Clicks\"   2016, Trustees of [facility], under license to [employer].  All rights reserved.   6 Clicks Short Forms Daily Activity Inpatient Short Form   [facility] AM-PAC  \"6 Clicks\" Daily Activity Inpatient Short Form   1. Putting on and taking off regular lower body clothing? 3 - A Little   2. Bathing (including washing, rinsing, drying)? 3 - A Little   3. Toileting, which includes using toilet, bedpan or urinal? 4 - None   4. Putting on and taking off regular upper body clothing? 4 - None   5. Taking care of personal grooming such as brushing teeth? 4 - None   6. Eating meals? 4 - None   Daily Activity Raw Score (Score out of 24.Lower scores equate to lower levels of function) 22   Total Evaluation Time   Total Evaluation Time (Minutes) 8     "

## 2019-03-29 NOTE — PROGRESS NOTES
"  VS: Stable   O2: Room air saturations 95%.    Output: Up to bathroom. Voiding in good amounts.    Last BM: Wed 3/27/2019 reports patient   Activity: Up with walker   Skin: Intact. Unable to assess under right knee dressing   Pain: Using Oxycodone 10 mg for pain every 3 hours. Also asking for Toradol every 6 hours. Complained of nausea this am and required Zofran x 1. Poor appetite in am.    CMS: Intact. Using CPM from 0-50 degrees. Patient states\" I really like having it on and using it all the time.\"    Dressing: Right knee dressing is CDI   Diet: Regular. Poor appetite in am. Sipping on gingerale and allowing Zofran to work. Will continue to monitor in this area.    LDA: Hemovac to suction. Dr Batista stated\" she may come back later in evening to discontinue.\"    Equipment: Hemovac/IS/Walker/continuous Oximetery   Plan: Patient is hoping to discharge to home yesterday. She is moving great with SBA   Additional Info:        "

## 2019-03-30 ENCOUNTER — APPOINTMENT (OUTPATIENT)
Dept: PHYSICAL THERAPY | Facility: CLINIC | Age: 64
DRG: 470 | End: 2019-03-30
Attending: ORTHOPAEDIC SURGERY
Payer: COMMERCIAL

## 2019-03-30 VITALS
WEIGHT: 127.21 LBS | OXYGEN SATURATION: 99 % | SYSTOLIC BLOOD PRESSURE: 145 MMHG | HEART RATE: 96 BPM | DIASTOLIC BLOOD PRESSURE: 74 MMHG | TEMPERATURE: 99 F | BODY MASS INDEX: 27.44 KG/M2 | HEIGHT: 57 IN | RESPIRATION RATE: 16 BRPM

## 2019-03-30 LAB — HGB BLD-MCNC: 9.7 G/DL (ref 11.7–15.7)

## 2019-03-30 PROCEDURE — 25000132 ZZH RX MED GY IP 250 OP 250 PS 637: Performed by: STUDENT IN AN ORGANIZED HEALTH CARE EDUCATION/TRAINING PROGRAM

## 2019-03-30 PROCEDURE — 25000131 ZZH RX MED GY IP 250 OP 636 PS 637: Performed by: STUDENT IN AN ORGANIZED HEALTH CARE EDUCATION/TRAINING PROGRAM

## 2019-03-30 PROCEDURE — 97530 THERAPEUTIC ACTIVITIES: CPT | Mod: GP | Performed by: PHYSICAL THERAPIST

## 2019-03-30 PROCEDURE — 25000132 ZZH RX MED GY IP 250 OP 250 PS 637: Performed by: INTERNAL MEDICINE

## 2019-03-30 PROCEDURE — 97116 GAIT TRAINING THERAPY: CPT | Mod: GP | Performed by: PHYSICAL THERAPIST

## 2019-03-30 PROCEDURE — 36415 COLL VENOUS BLD VENIPUNCTURE: CPT | Performed by: ORTHOPAEDIC SURGERY

## 2019-03-30 PROCEDURE — 97110 THERAPEUTIC EXERCISES: CPT | Mod: GP | Performed by: PHYSICAL THERAPIST

## 2019-03-30 PROCEDURE — 99207 ZZC CDG-CUT & PASTE-POTENTIAL IMPACT ON LEVEL: CPT | Performed by: INTERNAL MEDICINE

## 2019-03-30 PROCEDURE — 99232 SBSQ HOSP IP/OBS MODERATE 35: CPT | Performed by: INTERNAL MEDICINE

## 2019-03-30 PROCEDURE — 25000128 H RX IP 250 OP 636: Performed by: INTERNAL MEDICINE

## 2019-03-30 PROCEDURE — 85018 HEMOGLOBIN: CPT | Performed by: ORTHOPAEDIC SURGERY

## 2019-03-30 RX ORDER — HYDROMORPHONE HYDROCHLORIDE 2 MG/1
2-4 TABLET ORAL
Qty: 40 TABLET | Refills: 0 | Status: SHIPPED | OUTPATIENT
Start: 2019-03-30 | End: 2019-04-09

## 2019-03-30 RX ORDER — ONDANSETRON 4 MG/1
4 TABLET, ORALLY DISINTEGRATING ORAL EVERY 4 HOURS PRN
Qty: 30 TABLET | Refills: 0 | Status: SHIPPED | OUTPATIENT
Start: 2019-03-30 | End: 2019-06-13

## 2019-03-30 RX ORDER — HYDROMORPHONE HYDROCHLORIDE 2 MG/1
2-4 TABLET ORAL
Qty: 40 TABLET | Refills: 0 | Status: SHIPPED | OUTPATIENT
Start: 2019-03-30 | End: 2019-03-30

## 2019-03-30 RX ORDER — ONDANSETRON 4 MG/1
4 TABLET, ORALLY DISINTEGRATING ORAL EVERY 4 HOURS PRN
Status: DISCONTINUED | OUTPATIENT
Start: 2019-03-30 | End: 2019-03-30 | Stop reason: HOSPADM

## 2019-03-30 RX ORDER — PROCHLORPERAZINE MALEATE 5 MG
5-10 TABLET ORAL EVERY 6 HOURS PRN
Qty: 20 TABLET | Refills: 0 | Status: SHIPPED | OUTPATIENT
Start: 2019-03-30 | End: 2019-04-22

## 2019-03-30 RX ORDER — HYDROMORPHONE HYDROCHLORIDE 2 MG/1
2-4 TABLET ORAL
Status: DISCONTINUED | OUTPATIENT
Start: 2019-03-30 | End: 2019-03-30 | Stop reason: HOSPADM

## 2019-03-30 RX ADMIN — OXYCODONE HYDROCHLORIDE 10 MG: 5 TABLET ORAL at 03:16

## 2019-03-30 RX ADMIN — HYDROMORPHONE HYDROCHLORIDE 2 MG: 2 TABLET ORAL at 10:44

## 2019-03-30 RX ADMIN — ONDANSETRON 4 MG: 4 TABLET, ORALLY DISINTEGRATING ORAL at 09:24

## 2019-03-30 RX ADMIN — DEXLANSOPRAZOLE 60 MG: 60 CAPSULE, DELAYED RELEASE ORAL at 10:44

## 2019-03-30 RX ADMIN — OXYCODONE HYDROCHLORIDE 10 MG: 5 TABLET ORAL at 00:09

## 2019-03-30 RX ADMIN — PROCHLORPERAZINE MALEATE 5 MG: 5 TABLET, FILM COATED ORAL at 09:57

## 2019-03-30 RX ADMIN — FLUTICASONE PROPIONATE 1 SPRAY: 50 SPRAY, METERED NASAL at 07:40

## 2019-03-30 RX ADMIN — OXYCODONE HYDROCHLORIDE 10 MG: 5 TABLET ORAL at 06:33

## 2019-03-30 RX ADMIN — SENNOSIDES AND DOCUSATE SODIUM 2 TABLET: 8.6; 5 TABLET ORAL at 07:39

## 2019-03-30 RX ADMIN — ASPIRIN 325 MG: 325 TABLET, COATED ORAL at 07:39

## 2019-03-30 RX ADMIN — ACETAMINOPHEN 975 MG: 325 TABLET, FILM COATED ORAL at 10:44

## 2019-03-30 RX ADMIN — ACETAMINOPHEN 975 MG: 325 TABLET, FILM COATED ORAL at 03:16

## 2019-03-30 RX ADMIN — HYDROMORPHONE HYDROCHLORIDE 2 MG: 2 TABLET ORAL at 14:24

## 2019-03-30 RX ADMIN — BISACODYL 10 MG: 10 SUPPOSITORY RECTAL at 11:56

## 2019-03-30 ASSESSMENT — ACTIVITIES OF DAILY LIVING (ADL)
ADLS_ACUITY_SCORE: 12

## 2019-03-30 NOTE — PLAN OF CARE
Discharge Planner PT   Patient plan for discharge: home with assist, home PT. Pt hoping for today. Felt better with AM PT visit. Nausea under control. Pt still hoping to further ensure adequate pain control.   Current status: PT 8A: Pt was able to work on seated R TKA stretches/ex with cues from PT, achieved knee flexion to 97 deg. Pt SBA for bed mobility and transfers with FWW. Pt was able to ambulate 140 ft x 2 with FWW, SBA WBAT R LE. Pt navigated up and down 3 stairs with B rails, SBA with appropriate technique. Pt open to PM visit to further work on ex, then hoping she may be well enough to discharge home today.   Barriers to return to prior living situation: medical status  Recommendations for discharge: home with spouse assist, home PT  Rationale for recommendations: progress in PT       Entered by: Laura Friend 03/30/2019 12:21 PM     PM PT: Pt reports feeling ready for discharge.     Physical Therapy Discharge Summary    Reason for therapy discharge:    Discharged to home with home therapy.    Progress towards therapy goal(s). See goals on Care Plan in [facility] electronic health record for goal details.  Goals partially met.  Barriers to achieving goals:   discharge from facility.  pt close to meeting all goals    Therapy recommendation(s):    Continued therapy is recommended.  Rationale/Recommendations:  to maximize ROM, strength and functional safety at home. .

## 2019-03-30 NOTE — PLAN OF CARE
VS: VSS and afebrile   O2: In RA, denies SOB   Output: Voids spontaneously without difficulty   Last BM: 3/28 and passing gas    Activity: Up to BR with SBA   Skin: Dry and intact   Pain: Pt c/o of uncontrolled pain around 2000. Given IV dilaudid 1x.  Given PO Oxycodone q 3H and ice pack applied.    CMS: Intact   Dressing: C/D/I   Diet: Regular.  C/o nausea- Given IV Compazine 1x. Crackers and ginger ale given   LDA:  PIV at R hand : Patent and SL    Equipment: PCD, IV pole and FWW, personal belongings, CPM   Plan: Discharge today to home with home care.    Additional Info:

## 2019-03-30 NOTE — PROGRESS NOTES
Focus: Patients plan for discharge to home  DB: Patient to be discharged to home. Patient is having much more pain relief with PO Dilaudid. Patient also is less nauseated with oral Compazine. Patient was able to pass PT and participated with PT recommendations.   I: Went over all of patient discharge instructions verbally and on paper. Talked about follow up appointments, numbers to call for questions or concerns and hygiene and incision cares. Also taught patient a lot about medications and side effects and weaning of pain medications.   E: Patient seemed to have a full understanding of plan of cares for discharge. Patient was sent with all her belongings, and  filled scripts and CPM. Patient was escorted to front door in wheelchair to be discharged to home with .

## 2019-03-30 NOTE — PROGRESS NOTES
"Focus: patients complaints of nausea  DB: Patient states\" I may need to stay overnight to get that IV compazine.\"   I: Called Dr Viramontes to see if we could try changing pain med's to Oral Dilaudid. Medicated pt with Zofran and Compazine.   E: 1/2 hour after medicating patient with antiemetics patient started complaining of feeling of chills along with nausea. Patient states\" I just don't know if I am going to be able to go home if I keep having all these problems.\" Status of patient will continue to be monitored.    "

## 2019-03-30 NOTE — PROGRESS NOTES
[facility]    Medicine Progress Note - Hospitalist Service       Date of Admission:  3/28/2019  Assessment & Plan     Nisha Perez is a 63 year old female admitted on 3/28/2019 following procedure, s/p R TKA> for OA. By Dr Dai. No complication reported.      Currently doing well. Pain controlled. Nausea+  No other medical concern.    No significant PMH     # PONV  # GERD:   -continue PPI  - antiemetics prn  - minimize narcotics as able.   - bowel regimen.     # Insomnia: continue Ambien     # Orthopedics:   Hemodynamics: stable.   Discontinue ivf as amarjit po   Monitor hgb for anemia of acute blood loss. Transfuse for hgb <7.0     Analgesia: Per Orthopedic team.   DVT prophylaxis:- Per orthopedic team  Activity per orthopedic team.   Antibiotics and wound care as per primary team.   Encourage Incentive spirometry to prevent atelectasis   Minimize use of narcotics as able. Consider bowel regimen with narcotics.         Thank you for letting us get involved in care of Ms Perez.   Please page with any questions    Rest per ortho      The patient's care was discussed with the Bedside Nurse.     Doni Brandon MD  Hospitalist Service  [facility]    ______________________________________________________________________    Interval History      Nausea+ -- better  no vomiting or pain abdomen @ current.     No other concern  Denies fever or chills.   No cough or cp or sob.   No LH or dizziness.   No dysuria or bowel complaint.       Data reviewed today: I reviewed all medications, new labs and imaging results over the last 24 hours. I personally reviewed no images or EKG's today.    Physical Exam   Vital Signs: Temp: 99  F (37.2  C) Temp src: Oral BP: 145/74 Pulse: 96 Heart Rate: 98 Resp: 16 SpO2: 99 % O2 Device: None (Room air)    Weight: 127 lbs 3.29 oz     General: Alert, interactive, NAD.   HEENT: AT/NC. Anicteric.Moist MM.    Heart/CVS:  Normal S1 and S2.   Chest/Respi: Non labored breathing. CTA BL.   Abdomen/GI: Soft, non distended, non tender.   Extremities/MSK: Distal pulses 2+, well perfused. Rest per ortho.   Neuro: non focal.   Skin:  No new rash over exposed areas.         Data   Recent Labs   Lab 03/30/19  0631 03/29/19  0608 03/28/19  0627   HGB 9.7* 9.4*  --    GLC  --   --  105*     No results found for this or any previous visit (from the past 24 hour(s)).  Medications     lactated ringers Stopped (03/29/19 0320)       acetaminophen  975 mg Oral Q8H     aspirin  325 mg Oral Daily     bisacodyl  10 mg Rectal Daily     dexlansoprazole  60 mg Oral Daily     fluticasone  1 spray Both Nostrils BID     senna-docusate  1 tablet Oral BID    Or     senna-docusate  2 tablet Oral BID     sodium chloride (PF)  3 mL Intracatheter Q8H

## 2019-03-30 NOTE — PROGRESS NOTES
"Orthopedic Surgery Progress Note    Subjective: NAEO. Pain well controlled. Having issues with nausea. Apprehensive about going home but doing well with PT.   Denies any numbness, tingling, motor weakness. No f/c, CP, SOB    Exam:  /74 (BP Location: Left arm)   Pulse 96   Temp 99  F (37.2  C) (Oral)   Resp 16   Ht 1.448 m (4' 9.01\")   Wt 57.7 kg (127 lb 3.3 oz)   SpO2 99%   BMI 27.52 kg/m    Gen: Awake, alert, NAD  Resp: breathing equal and non-labored  Extremities:  RLE: Dressing c/d/i. 5/5 EHL/FHL/TA/Gsc. SILT in s/s/dp/sp/t distributions. 2+ DP and PT pulses. Toes WWP, brisk cap refill.      Labs:    Recent Labs   Lab 03/30/19  0631 03/29/19  0608   HGB 9.7* 9.4*     Recent Labs   Lab 03/28/19  0627   *     No lab results found in last 7 days.    Imaging:  R knee: appropriate changes and alignment s/p TKA    Assessment:   Assessment/Plan: Nisha Perez is a 63 year old female s/p Right Total Knee Arthroplasty on 3/28/2019 with Dr. Dai.     Activity: Up with assist and assistive devices as needed until independent. Knee ROM as tolerated. Advance CPM as tolerated to goal of 0 to 90 degrees.  Weight bearing status: WBAT    Antibiotics: completed  Diet: Begin with clear fluids and progress diet as tolerated. Bowel regimen. Anti-emetics PRN.    DVT prophylaxis: Aspirin 325mg daily x 4 weeks  Elevation: Elevate heels off of bed on pillows, no pillows behind the knee at any time    Wound Care: Dressing changed at bedside POD 1, now PRN  Drains: removed 3/29     Pain management: Orals PRN, IV for breakthrough only  X-rays: AP/Lat operative knee XR - completed and reviewed  Physical Therapy: Mobilization, ROM, ADL's  Occupational Therapy: ADL's  Labs: Trend Hgb on PODs #1 & 2  Cultures: None  Consults: PT, OT. Hospitalist, appreciate assistance in caring for this patient throughout the perioperative period  Disposition: Pending progress with therapies, pain control on orals, and medical stability, " anticipate discharge to Home Saturday    Future Appointments   Date Time Provider Department Center   3/29/2019  9:30 AM Tania Lopez, OT [facility] [place]   3/29/2019  2:00 PM Paulette Smith, PT [facility] [place]   5/2/2019  9:00 AM Nurse, [place] [facility]   5/10/2019 10:30 AM Mehran Salgaod MD [facility]   5/10/2019 12:00 PM Saira Steele, OT [facility]   5/28/2019 11:45 AM Monie Dai MD [facility]        Mehran Viramontes MD   Orthopaedic Surgery Resident, PGY-4  Pager: [phone]

## 2019-04-01 ENCOUNTER — PATIENT OUTREACH (OUTPATIENT)
Dept: CARE COORDINATION | Facility: CLINIC | Age: 64
End: 2019-04-01

## 2019-04-01 ENCOUNTER — TELEPHONE (OUTPATIENT)
Dept: ORTHOPEDICS | Facility: CLINIC | Age: 64
End: 2019-04-01

## 2019-04-01 ASSESSMENT — ACTIVITIES OF DAILY LIVING (ADL): DEPENDENT_IADLS:: TRANSPORTATION;INDEPENDENT

## 2019-04-01 NOTE — LETTER
Health Care Home - Access Care Plan    About Me  Patient Name:  Nisha Perez    YOB: 1955  Age:                             63 year old   [place] MRN:            [id number] Telephone Information:   Home Phone [phone]   Mobile [phone]       Address:    [address] [place] [address] Email address:  [email or web address]      Emergency Contact(s)  Name Relationship Lgl Grd Work Phone Home Phone Mobile Phone   FREDRICK SEGOVIA Spouse   [phone]             Health Maintenance: Routine Health maintenance Reviewed: Due/Overdue   Health Maintenance Due   Topic Date Due     PREVENTIVE CARE VISIT  1955     HIV SCREEN (SYSTEM ASSIGNED)  08/30/1973     HEPATITIS C SCREENING  08/30/1973     ZOSTER IMMUNIZATION (1 of 2) 08/30/2005     INFLUENZA VACCINE (1) 09/01/2018     HPV Q5 YEARS (Complete with PAP)  04/09/2019     Pt to talk with provider about what is needed or can continue wait.        My Access Plan  Medical Emergency 919   Questions or concerns during clinic hours Primary Clinic Line, I will call the clinic directly: [facility] - [phone]   24 Hour Appointment Line [phone] or  [phone]) (toll free)   24 Hour Nurse Line [phone] (toll free)   Questions or concerns outside clinic hours 24 Hour Appointment Line, I will call the after-hours on-call line:   [facility] [phone] or [phone]) (toll-free)   Preferred Urgent Care [facility], [phone]   [facility]  [phone]   Preferred Pharmacy [facility] [address] [place] [address] [facility] [place]     Behavioral Health Crisis Line The National Suicide Prevention Lifeline at 1-920.999.8419 or 911     My Care Team Members  Patient Care Team       Relationship Specialty Notifications Start End    Emerson Spear PA-C PCP - General  Physician Assistant  2/21/19     Phone: [phone] Fax: [phone] [address] [address]    Monie Dai MD MD Orthopedics  2/1/19     Phone: [phone] Fax: [phone]         [address] [facility] [address]    Mehran Salgado MD MD Hand Surgery  2/11/19     Phone: [phone] Fax: [phone]         [address] [facility] [address]    Emerson Spear PA-C Assigned PCP   3/24/19     Phone: [phone] Fax: [phone]         [address] [address]    [facility]  3/29/19     Phone: [phone]         Kamilah Noel RN Clinic Care Coordinator Primary Care - CC Admissions 4/1/19     Phone: [phone] Fax: [phone]               My Medical and Care Information  Problem List   Patient Active Problem List   Diagnosis     iamJOINT DERANGEMENT NEC-L/LEG     Subluxation of patella, acquired     Aftercare following joint replacement     Knee joint replacement by other means     Abnormal gait     Anxiety disorder     Advanced directives, counseling/discussion     Phobia, flying     Gastroesophageal reflux disease without esophagitis     Bilateral thumb pain     Primary osteoarthritis of both first carpometacarpal joints     Status post knee surgery      Current Medications and Allergies:  See printed Medication Report

## 2019-04-01 NOTE — PROGRESS NOTES
Clinic Care Coordination Contact  [facility]/Voicemail    Referral Source: IP Report    Clinical Data: Care Coordinator Outreach, discharge from [facility] 3/30/19 to home with [facility], SN/PT. Patient had (R) TKA done. She is scheduled to see surgeon 5/2/19.    Outreach attempted x 1.  Left message on voicemail with call back information and requested return call.    Plan: Care Coordinator will mail out care coordination introduction letter with care coordinator contact information and explanation of care coordination services. Care Coordinator will try to reach patient again in 1-2 business days.    Kamilah BRUNNER, RN, PHN, [facility]  Primary Care Clinic RN Care Coordinator  [facility]   [email or web address]  Office:  [phone]

## 2019-04-01 NOTE — TELEPHONE ENCOUNTER
Reached out to patient to reschedule surgery with Dr. Salgado, from 5/3 to 5/24. Reschedule post-ops to 5/31. Patient is aware of new dates. Added patient onto waitlist incase something opens up sooner.

## 2019-04-01 NOTE — TELEPHONE ENCOUNTER
[facility] Call Center    Phone Message    May a detailed message be left on voicemail: yes    Reason for Call: Order(s): Home Care Orders: Physical Therapy (PT): EVAL AND TREAT and Skilled Nursinx/wk for 3 wks and 3 prn visits.     Action Taken: Message routed to:  [facility]): Orthopedic

## 2019-04-01 NOTE — LETTER
[place] CARE COORDINATION  [facility]   [address] [place] [address]   [phone]    April 1, 2019    Nisha Perez  [address] [place] [address]      Dear Nisha,    I am a clinic care coordinator who works with Emerson Spear PA-C at [facility]. I have been trying to reach you recently to introduce Clinic Care Coordination and to see if there was anything I could assist you with.  I wanted to introduce myself and provide you with my contact information so that you can call me with questions or concerns about your health care. Below is a description of clinic care coordination and how I can further assist you.     The clinic care coordinator is a registered nurse and/or  who understand the health care system. The goal of clinic care coordination is to help you manage your health and improve access to the [facility] system in the most efficient manner. The registered nurse can assist you in meeting your health care goals by providing education, coordinating services, and strengthening the communication among your providers. The  can assist you with financial, behavioral, psychosocial, chemical dependency, counseling, and/or psychiatric resources.    Please feel free to contact me at [phone], with any questions or concerns. We at [facility] are focused on providing you with the highest-quality healthcare experience possible and that all starts with you.     Sincerely,     Kamilah BRUNNER, RN, PHN, [facility]  Primary Care Clinic RN Care Coordinator  [facility]   [email or web address]  Office:  [phone]      Enclosed: I have enclosed a copy of a 24 Hour Access Plan. This has helpful phone numbers for you to call when needed. Please keep this in an easy to access place to use as needed.

## 2019-04-01 NOTE — PROGRESS NOTES
Clinic Care Coordination Contact    Clinic Care Coordination Contact  OUTREACH    Referral Information:  Referral Source: IP Report    Primary Diagnosis: Orthopedic    Chief Complaint   Patient presents with     Clinic Care Coordination - Post Hospital     Nurse: d/c from [facility] 3/30/19-[facility]        [place] Utilization: No concerns at this time.   Clinic Utilization  Difficulty keeping appointments:: No  Compliance Concerns: No  No-Show Concerns: No  No PCP office visit in Past Year: No  Utilization    Last refreshed: 4/1/2019  2:03 PM:  Hospital Admissions 1           Last refreshed: 4/1/2019  2:03 PM:  ED Visits 0           Last refreshed: 4/1/2019  2:03 PM:  No Show Count (past year) 0              Current as of: 4/1/2019  2:03 PM              Clinical Concerns:  Patient returned Care Coordinator RN call. She was discharged from [facility] 3/30/19 after having R TKA done. She is scheduled to see surgeon 5/2/19. She states home care nurse was there yesterday and is coming back on Thursday. She waiting to hear from PT. She denies having any questions or concerns regarding her discharge instructions. She states she is only taking tylenol for pain.   Current Medical Concerns:    Patient Active Problem List   Diagnosis     iamJOINT DERANGEMENT NEC-L/LEG     Subluxation of patella, acquired     Aftercare following joint replacement     Knee joint replacement by other means     Abnormal gait     Anxiety disorder     Advanced directives, counseling/discussion     Phobia, flying     Gastroesophageal reflux disease without esophagitis     Bilateral thumb pain     Primary osteoarthritis of both first carpometacarpal joints     Status post knee surgery         Current Behavioral Concerns: No concerns at this time.       Education Provided to patient: RN CC educated about Care Coordination Services, discharge instructions, medications reviewed and follow up.      Pain  Pain (GOAL):: Yes  Type: Acute (<3mo)  Location of chronic  pain:: R TKA  Progression: Improving  Chronic pain severity:: 3  Alleviating Factors: Pain Medication, Rest  Aggravating Factors: Activity  Health Maintenance Reviewed: Due/Overdue   Health Maintenance Due   Topic Date Due     PREVENTIVE CARE VISIT  1955     HIV SCREEN (SYSTEM ASSIGNED)  08/30/1973     HEPATITIS C SCREENING  08/30/1973     ZOSTER IMMUNIZATION (1 of 2) 08/30/2005     INFLUENZA VACCINE (1) 09/01/2018     HPV Q5 YEARS (Complete with PAP)  04/09/2019       Clinical Pathway: None    Medication Management:  Patient independent in medication management and verbalizes adherence and understanding of medication regimen.        Functional Status:  Dependent ADLs:: Independent  Dependent IADLs:: Transportation, Independent  Bed or wheelchair confined:: No  Mobility Status: Independent w/Device  Fallen 2 or more times in the past year?: No  Any fall with injury in the past year?: No    Living Situation:  Current living arrangement:: I live in a private home with spouse  Type [facility]    Diet/Exercise/Sleep:  Diet:: Regular  Inadequate nutrition (GOAL):: No  Food Insecurity: No  Tube Feeding: No  Exercise:: Currently not exercising  Inadequate activity/exercise (GOAL):: No  Significant changes in sleep pattern (GOAL): No    Transportation:  Transportation concerns (GOAL):: No  Transportation means:: Family, Friend, Regular car     Psychosocial:  Cheondoism or spiritual beliefs that impact treatment:: No  Mental health DX:: Yes  Informal Support system:: Family, Spouse, Friends     Financial/Insurance:   Financial/Insurance concerns (GOAL):: No  Denies having any concerns.     Resources and Interventions:  Current Resources: RN CC mailed out to patient intro letter, access care plan and care coordination services brochure.    List of home care services:: Skilled Nursing, Physicial Therapy;   Community Resources: Home Care  Supplies used at home:: Other(CPM machine)  Equipment  Currently Used at Home: raised toilet, walker, standard, crutches(has crutches, walker)    Advance Care Plan/Directive  Advanced Care Plans/Directives on file:: No    Referrals Placed: None       Future Appointments              In 1 month Nurse, Sanjuanita QUIROGA [facility], [facility]    In 1 month Monie Dai MD [facility] Orthopaedic Clinic, [facility]    In 2 months Mehran Salgado MD [facility] Orthopaedic Clinic, [facility]    In 2 months Raven Rivera, [facility] Hand Therapy, [facility]          Plan:   Patient will continue to follow treatment plan as directed and follow up with PCP with concerns ongoing.   No further Care Coordination needs identified at this time. Patient may be referred to Care Coordination in the future if additional needs arise.  Pt encouraged to contact Care Coordinator through the clinic if situation changes and assistance is needed. No follow-up planned.    Kamilah BRUNNER, RN, PHN, [facility]  Primary Care Clinic RN Care Coordinator  [facility]   [email or web address]  Office:  [phone]

## 2019-04-02 ENCOUNTER — TELEPHONE (OUTPATIENT)
Dept: ORTHOPEDICS | Facility: CLINIC | Age: 64
End: 2019-04-02

## 2019-04-02 NOTE — TELEPHONE ENCOUNTER
[facility] Call [facility]    Phone Message    May a detailed message be left on voicemail: yes    Reason for Call: Order(s): Home Care Orders: Physical Therapy (PT): 1 more time this week then 2 x week for 2 weeks    Action Taken: Message routed to:  [facility]): orthopedics

## 2019-04-08 ENCOUNTER — TELEPHONE (OUTPATIENT)
Dept: ORTHOPEDICS | Facility: CLINIC | Age: 64
End: 2019-04-08

## 2019-04-08 DIAGNOSIS — Z98.890 STATUS POST KNEE SURGERY: ICD-10-CM

## 2019-04-08 NOTE — TELEPHONE ENCOUNTER
[facility] Call Center    Phone Message    May a detailed message be left on voicemail: no    Reason for Call: Medication Refill Request    Has the patient contacted the pharmacy for the refill? Yes   Name of medication being requested: HYDROmorphone (DILAUDID) 2 MG tablet  Provider who prescribed the medication: Dr. Dai  Pharmacy: Please mail to patient  Date medication is needed: 16 pills left. Pt wants a call when the script has been mailed out.    Action Taken: Message routed to:  [facility] ([facility]): [facility] ORTHOPEDICS [facility]

## 2019-04-09 RX ORDER — HYDROMORPHONE HYDROCHLORIDE 2 MG/1
2-4 TABLET ORAL EVERY 4 HOURS PRN
Qty: 40 TABLET | Refills: 0 | Status: SHIPPED | OUTPATIENT
Start: 2019-04-09 | End: 2019-04-18

## 2019-04-09 NOTE — TELEPHONE ENCOUNTER
DOS: 3/28/19 Right total knee arthroplasty.    Refilled per standing orders. Prescription mailed to patient per patient request.

## 2019-04-10 NOTE — TELEPHONE ENCOUNTER
Patient was called and told that her prescription was placed in the mail yesterday.  Her pop appointment was also changed form 5/2/219 to 5/3/2019 at 11:00am.  She was agreeable with this.

## 2019-04-11 ENCOUNTER — TELEPHONE (OUTPATIENT)
Dept: ORTHOPEDICS | Facility: CLINIC | Age: 64
End: 2019-04-11

## 2019-04-11 DIAGNOSIS — M25.569 KNEE PAIN: Primary | ICD-10-CM

## 2019-04-11 RX ORDER — HYDROXYZINE PAMOATE 25 MG/1
CAPSULE ORAL
Qty: 30 CAPSULE | Refills: 1 | Status: SHIPPED | OUTPATIENT
Start: 2019-04-11 | End: 2019-06-13

## 2019-04-11 NOTE — TELEPHONE ENCOUNTER
[facility] Call Center    Phone Message    May a detailed message be left on voicemail: no    Reason for Call: Other: Pt reports her knee is getting very tight and difficult to move and would like to be prescribed a muscle relaxer to help with her range of motion and tightness. Please send any scripts to Tatiana Ferguson. Please call Pt back to discuss.     Action Taken: Message routed to:  [facility] ([facility]): [facility] ORTHOPEDICS [facility]

## 2019-04-11 NOTE — TELEPHONE ENCOUNTER
[facility] Call Center    Phone Message    May a detailed message be left on voicemail: yes    Reason for Call: Other: [facility] in [place] just called stating they just received an [facility] Rx for pt of Dr. Dai's.  The medication is Hydroxyzine and the pharmacist says that is off the market and needs a call back, please call the pharmacy at [phone]     Action Taken: Message routed to:  [facility] ([facility]): Ortho

## 2019-04-18 ENCOUNTER — TELEPHONE (OUTPATIENT)
Dept: ORTHOPEDICS | Facility: CLINIC | Age: 64
End: 2019-04-18

## 2019-04-18 DIAGNOSIS — Z98.890 STATUS POST KNEE SURGERY: ICD-10-CM

## 2019-04-18 RX ORDER — HYDROMORPHONE HYDROCHLORIDE 2 MG/1
2-4 TABLET ORAL EVERY 4 HOURS PRN
Qty: 40 TABLET | Refills: 0 | Status: SHIPPED | OUTPATIENT
Start: 2019-04-18 | End: 2019-05-03

## 2019-04-18 NOTE — TELEPHONE ENCOUNTER
[facility] Call [facility]    Phone Message    May a detailed message be left on voicemail: yes    Reason for Call: Medication Refill Request    Has the patient contacted the pharmacy for the refill? Yes   Name of medication being requested: Hydromorphone 2mg  Provider who prescribed the medication: Dr. Dai  Pharmacy: [facility], [address] [place] [address]  Date medication is needed: Today, please call pt when this has been mailed out to pharmacy       Action Taken: Message routed to:  [facility] ([facility]): Ortho

## 2019-04-22 ENCOUNTER — OFFICE VISIT (OUTPATIENT)
Dept: PODIATRY | Facility: CLINIC | Age: 64
End: 2019-04-22
Payer: COMMERCIAL

## 2019-04-22 ENCOUNTER — MEDICAL CORRESPONDENCE (OUTPATIENT)
Dept: HEALTH INFORMATION MANAGEMENT | Facility: CLINIC | Age: 64
End: 2019-04-22

## 2019-04-22 ENCOUNTER — ANCILLARY PROCEDURE (OUTPATIENT)
Dept: GENERAL RADIOLOGY | Facility: CLINIC | Age: 64
End: 2019-04-22
Attending: PODIATRIST
Payer: COMMERCIAL

## 2019-04-22 VITALS
HEIGHT: 57 IN | WEIGHT: 122 LBS | DIASTOLIC BLOOD PRESSURE: 86 MMHG | BODY MASS INDEX: 26.32 KG/M2 | SYSTOLIC BLOOD PRESSURE: 120 MMHG

## 2019-04-22 DIAGNOSIS — M77.52 BURSITIS OF LEFT FOOT: ICD-10-CM

## 2019-04-22 DIAGNOSIS — M21.622 TAILOR'S BUNION OF LEFT FOOT: Primary | ICD-10-CM

## 2019-04-22 PROCEDURE — 73630 X-RAY EXAM OF FOOT: CPT | Mod: TC

## 2019-04-22 PROCEDURE — 99213 OFFICE O/P EST LOW 20 MIN: CPT | Performed by: PODIATRIST

## 2019-04-22 ASSESSMENT — MIFFLIN-ST. JEOR: SCORE: 982.39

## 2019-04-22 ASSESSMENT — PAIN SCALES - GENERAL: PAINLEVEL: MILD PAIN (3)

## 2019-04-22 NOTE — PROGRESS NOTES
HPI:  Nisha Perez is a 63 year old female who is seen in consultation at the request of self.    Pt presents for eval of:   (Onset, Location, L/R, Character, Treatments, Injury if yes)    XR Left foot today, 4/22/2019    DOS 3/28/2019 - Right Total Knee Arthroplasty     Onset early Jan 2019, lateral Left foot pain. No injury noted. Presents today with lace up non supportive casual shoes.  With activity, Constant, sharp, stabbing, pain 3    Retired.    Weight management plan: Patient was referred to their PCP to discuss a diet and exercise plan.     Patient to follow up with Primary Care provider regarding elevated blood pressure.    ROS:  10 point ROS neg other than the symptoms noted above in the HPI.    Patient Active Problem List   Diagnosis     iamJOINT DERANGEMENT NEC-L/LEG     Subluxation of patella, acquired     Aftercare following joint replacement     Knee joint replacement by other means     Abnormal gait     Anxiety disorder     Advanced directives, counseling/discussion     Phobia, flying     Gastroesophageal reflux disease without esophagitis     Bilateral thumb pain     Primary osteoarthritis of both first carpometacarpal joints     Status post knee surgery       PAST MEDICAL HISTORY:   Past Medical History:   Diagnosis Date     Anxiety disorder      Gastro-oesophageal reflux disease     esophagitis     Injury of knee, left      Insomnia      Osteoarthritis      Osteoporosis         PAST SURGICAL HISTORY:   Past Surgical History:   Procedure Laterality Date     APPENDECTOMY       ARTHRODESIS ANKLE      left     ARTHROPLASTY KNEE Right 3/28/2019    Procedure: Right Total Knee Arthroplasty;  Surgeon: Monie Lloyd MD;  Location:  OR     ARTHROPLASTY PATELLO-FEMORAL (KNEE)  7/21/2011    Procedure:ARTHROPLASTY PATELLO-FEMORAL (KNEE); Left Knee Latia Arthrolplasty Prosthesis, Removal of hardware left knee; Surgeon:MONIE LLOYD; Location: OR     ARTHROSCOPY KNEE      left     ARTHROSCOPY  KNEE      right      SECTION      X 4     TONSILLECTOMY          MEDICATIONS:   Current Outpatient Medications:      aspirin (ASA) 325 MG EC tablet, Take 1 tablet (325 mg) by mouth daily, Disp: 30 tablet, Rfl: 0     dexlansoprazole (DEXILANT) 60 MG CPDR CR capsule, Take 1 capsule (60 mg) by mouth daily, Disp: 90 capsule, Rfl: 3     diphenhydrAMINE (BENADRYL) 25 MG tablet, Take 25 mg by mouth every 6 hours as needed., Disp: , Rfl:      fluticasone (FLONASE) 50 MCG/ACT nasal spray, Spray 1 spray into both nostrils 2 times daily, Disp: , Rfl:      HYDROmorphone (DILAUDID) 2 MG tablet, Take 1-2 tablets (2-4 mg) by mouth every 4 hours as needed for moderate to severe pain, Disp: 40 tablet, Rfl: 0     hydrOXYzine (VISTARIL) 25 MG capsule, Take one capsule by mouth every 6 hours prn adjuvant pain and muscle spasm., Disp: 30 capsule, Rfl: 1     zolpidem ER (AMBIEN CR) 6.25 MG CR tablet, Take 6.25 mg by mouth nightly as needed for sleep, Disp: , Rfl:      acetaminophen (TYLENOL) 325 MG tablet, Take 3 tablets (975 mg) by mouth every 8 hours, Disp: 100 tablet, Rfl: 0     ondansetron (ZOFRAN-ODT) 4 MG ODT tab, Take 1 tablet (4 mg) by mouth every 4 hours as needed for nausea or vomiting, Disp: 30 tablet, Rfl: 0     order for DME, Equipment being ordered: CPM: Advance as tolerated. Range 0-90 degree flexion Treatment Diagnosis: Status post Total knee replacement, Disp: 1 Units, Rfl: 0     Pseudoephedrine HCl (SUDAFED PO), Take  by mouth at onset of headache., Disp: , Rfl:      senna-docusate (SENOKOT-S/PERICOLACE) 8.6-50 MG tablet, Take 2 tablets by mouth 2 times daily, Disp: 120 tablet, Rfl: 0     ALLERGIES:    Allergies   Allergen Reactions     Latex Itching     burning     Latex      PN: Converted from LW Latex Sensitivity Flag        SOCIAL HISTORY:   Social History     Socioeconomic History     Marital status:      Spouse name: Not on file     Number of children: Not on file     Years of education: Not on file      Highest education level: Not on file   Occupational History     Not on file   Social Needs     Financial resource strain: Not on file     Food insecurity:     Worry: Not on file     Inability: Not on file     Transportation needs:     Medical: Not on file     Non-medical: Not on file   Tobacco Use     Smoking status: Never Smoker     Smokeless tobacco: Never Used   Substance and Sexual Activity     Alcohol use: Yes     Comment: 4-6 drinks per week     Drug use: No     Sexual activity: Not Currently     Partners: Male   Lifestyle     Physical activity:     Days per week: Not on file     Minutes per session: Not on file     Stress: Not on file   Relationships     Social connections:     Talks on phone: Not on file     Gets together: Not on file     Attends Taoism service: Not on file     Active member of club or organization: Not on file     Attends meetings of clubs or organizations: Not on file     Relationship status: Not on file     Intimate partner violence:     Fear of current or ex partner: Not on file     Emotionally abused: Not on file     Physically abused: Not on file     Forced sexual activity: Not on file   Other Topics Concern     Parent/sibling w/ CABG, MI or angioplasty before 65F 55M? Not Asked   Social History Narrative     Not on file        FAMILY HISTORY:   Family History   Problem Relation Age of Onset     Colon Cancer Mother      Diabetes No family hx of      Coronary Artery Disease No family hx of      Hypertension No family hx of      Hyperlipidemia No family hx of      Cerebrovascular Disease No family hx of      Breast Cancer No family hx of      Anxiety Disorder No family hx of      Osteoporosis No family hx of      Obesity No family hx of      Depression No family hx of      Prostate Cancer No family hx of      Other Cancer No family hx of      Mental Illness No family hx of      Substance Abuse No family hx of      Asthma No family hx of      Genetic Disorder No family hx of       "Thyroid Disease No family hx of      Anesthesia Reaction No family hx of         EXAM:Vitals: /86 (BP Location: Left arm, Cuff Size: Adult Regular)   Ht 1.448 m (4' 9.01\")   Wt 55.3 kg (122 lb)   BMI 26.39 kg/m    BMI= Body mass index is 26.39 kg/m .    General appearance: Patient is alert and fully cooperative with history & exam.  No sign of distress is noted during the visit.     Psychiatric: Affect is pleasant & appropriate.  Patient appears motivated to improve health.     Respiratory: Breathing is regular & unlabored while sitting.     HEENT: Hearing is intact to spoken word.  Speech is clear.  No gross evidence of visual impairment that would impact ambulation.     Vascular: DP & PT pulses are intact & regular bilaterally.  No significant edema or varicosities noted.  CFT and skin temperature is normal to both lower extremities.     Neurologic: Lower extremity sensation is intact to light touch.  No evidence of weakness or contracture in the lower extremities.  No evidence of neuropathy.    Dermatologic: Skin is intact to both lower extremities with adequate texture, turgor and tone about the integument.  No paronychia or evidence of soft tissue infection is noted.     Musculoskeletal: Patient is ambulatory without assistive device or brace.  No gross ankle deformity noted.  No foot or ankle joint effusion is noted.  Subtle talar bunion or prominence about the lateral fifth metatarsal head with symptoms on the left but not on the right.  No palpable bursa is noted directly over the prominence but rather about 1 cm proximal.  Most of this discomfort is plantarly not dorsally.  Manual muscle strength +5/5 to all 4 quadrants.  No pain along the neck or shaft of the metatarsal dorsally no palpable edema or fluctuance is noted.    Radiographs: 3 views left foot demonstrate mildly elevated lateral deviation angle intermetatarsal angle of the fifth metatarsal.  Mild tailor's bunion deformity.   "   ASSESSMENT:       ICD-10-CM    1. Tailor's bunion of left foot M21.622    2. Bursitis of left foot M77.52         PLAN:  Reviewed patient[facility] chart in [facility].      4/22/2019   This appears to be overuse associated with walking placement.  She does have subtle deformity and we discussed how to accommodate this.  We discussed injections and oral anti-inflammatories however within 30 days of her knee replacement she would rather not utilize these treatments and her symptoms are quite mild so that seems reasonable at this time.  If this remains symptomatic over the next month we may consider adding an injection or oral anti-inflammatories.  I suspect she can accommodate this quite well and continue activities as tolerated.  All questions were answered and she will follow-up in the next month or so if this remains symptomatic.    Bakari Montgomery DPM

## 2019-04-22 NOTE — LETTER
4/22/2019         RE: Nisha Perez  [address] [place] [id number]        Dear Colleague,    Thank you for referring your patient, Nisha Perez, to the [facility]. Please see a copy of my visit note below.    HPI:  Nisha Perez is a 63 year old female who is seen in consultation at the request of self.    Pt presents for eval of:   (Onset, Location, L/R, Character, Treatments, Injury if yes)    XR Left foot today, 4/22/2019    DOS 3/28/2019 - Right Total Knee Arthroplasty     Onset early Jan 2019, lateral Left foot pain. No injury noted. Presents today with lace up non supportive casual shoes.  With activity, Constant, sharp, stabbing, pain 3    Retired.    Weight management plan: Patient was referred to their PCP to discuss a diet and exercise plan.     Patient to follow up with Primary Care provider regarding elevated blood pressure.    ROS:  10 point ROS neg other than the symptoms noted above in the HPI.    Patient Active Problem List   Diagnosis     iamJOINT DERANGEMENT NEC-L/LEG     Subluxation of patella, acquired     Aftercare following joint replacement     Knee joint replacement by other means     Abnormal gait     Anxiety disorder     Advanced directives, counseling/discussion     Phobia, flying     Gastroesophageal reflux disease without esophagitis     Bilateral thumb pain     Primary osteoarthritis of both first carpometacarpal joints     Status post knee surgery       PAST MEDICAL HISTORY:   Past Medical History:   Diagnosis Date     Anxiety disorder      Gastro-oesophageal reflux disease     esophagitis     Injury of knee, left      Insomnia      Osteoarthritis      Osteoporosis         PAST SURGICAL HISTORY:   Past Surgical History:   Procedure Laterality Date     APPENDECTOMY       ARTHRODESIS ANKLE      left     ARTHROPLASTY KNEE Right 3/28/2019    Procedure: Right Total Knee Arthroplasty;  Surgeon: Monie Dai MD;  Location: [facility]     ARTHROPLASTY  PATELLO-FEMORAL (KNEE)  2011    Procedure:ARTHROPLASTY PATELLO-FEMORAL (KNEE); Left Knee Latia Arthrolplasty Prosthesis, Removal of hardware left knee; Surgeon:TORI LLOYD; Location:[facility]     ARTHROSCOPY KNEE      left     ARTHROSCOPY KNEE      right      SECTION      X 4     TONSILLECTOMY          MEDICATIONS:   Current Outpatient Medications:      aspirin (ASA) 325 MG EC tablet, Take 1 tablet (325 mg) by mouth daily, Disp: 30 tablet, Rfl: 0     dexlansoprazole (DEXILANT) 60 MG CPDR CR capsule, Take 1 capsule (60 mg) by mouth daily, Disp: 90 capsule, Rfl: 3     diphenhydrAMINE (BENADRYL) 25 MG tablet, Take 25 mg by mouth every 6 hours as needed., Disp: , Rfl:      fluticasone (FLONASE) 50 MCG/ACT nasal spray, Spray 1 spray into both nostrils 2 times daily, Disp: , Rfl:      HYDROmorphone (DILAUDID) 2 MG tablet, Take 1-2 tablets (2-4 mg) by mouth every 4 hours as needed for moderate to severe pain, Disp: 40 tablet, Rfl: 0     hydrOXYzine (VISTARIL) 25 MG capsule, Take one capsule by mouth every 6 hours prn adjuvant pain and muscle spasm., Disp: 30 capsule, Rfl: 1     zolpidem ER (AMBIEN CR) 6.25 MG CR tablet, Take 6.25 mg by mouth nightly as needed for sleep, Disp: , Rfl:      acetaminophen (TYLENOL) 325 MG tablet, Take 3 tablets (975 mg) by mouth every 8 hours, Disp: 100 tablet, Rfl: 0     ondansetron (ZOFRAN-ODT) 4 MG ODT tab, Take 1 tablet (4 mg) by mouth every 4 hours as needed for nausea or vomiting, Disp: 30 tablet, Rfl: 0     order for DME, Equipment being ordered: CPM: Advance as tolerated. Range 0-90 degree flexion Treatment Diagnosis: Status post Total knee replacement, Disp: 1 Units, Rfl: 0     Pseudoephedrine HCl (SUDAFED PO), Take  by mouth at onset of headache., Disp: , Rfl:      senna-docusate (SENOKOT-S/PERICOLACE) 8.6-50 MG tablet, Take 2 tablets by mouth 2 times daily, Disp: 120 tablet, Rfl: 0     ALLERGIES:    Allergies   Allergen Reactions     Latex Itching     burning      Latex      PN: Converted from LW Latex Sensitivity Flag        SOCIAL HISTORY:   Social History     Socioeconomic History     Marital status:      Spouse name: Not on file     Number of children: Not on file     Years of education: Not on file     Highest education level: Not on file   Occupational History     Not on file   Social Needs     Financial resource strain: Not on file     Food insecurity:     Worry: Not on file     Inability: Not on file     Transportation needs:     Medical: Not on file     Non-medical: Not on file   Tobacco Use     Smoking status: Never Smoker     Smokeless tobacco: Never Used   Substance and Sexual Activity     Alcohol use: Yes     Comment: 4-6 drinks per week     Drug use: No     Sexual activity: Not Currently     Partners: Male   Lifestyle     Physical activity:     Days per week: Not on file     Minutes per session: Not on file     Stress: Not on file   Relationships     Social connections:     Talks on phone: Not on file     Gets together: Not on file     Attends Oriental orthodox service: Not on file     Active member of club or organization: Not on file     Attends meetings of clubs or organizations: Not on file     Relationship status: Not on file     Intimate partner violence:     Fear of current or ex partner: Not on file     Emotionally abused: Not on file     Physically abused: Not on file     Forced sexual activity: Not on file   Other Topics Concern     Parent/sibling w/ CABG, MI or angioplasty before 65F 55M? Not Asked   Social History Narrative     Not on file        FAMILY HISTORY:   Family History   Problem Relation Age of Onset     Colon Cancer Mother      Diabetes No family hx of      Coronary Artery Disease No family hx of      Hypertension No family hx of      Hyperlipidemia No family hx of      Cerebrovascular Disease No family hx of      Breast Cancer No family hx of      Anxiety Disorder No family hx of      Osteoporosis No family hx of      Obesity No family hx  "of      Depression No family hx of      Prostate Cancer No family hx of      Other Cancer No family hx of      Mental Illness No family hx of      Substance Abuse No family hx of      Asthma No family hx of      Genetic Disorder No family hx of      Thyroid Disease No family hx of      Anesthesia Reaction No family hx of         EXAM:Vitals: /86 (BP Location: Left arm, Cuff Size: Adult Regular)   Ht 1.448 m (4' 9.01\")   Wt 55.3 kg (122 lb)   BMI 26.39 kg/m     BMI= Body mass index is 26.39 kg/m .    General appearance: Patient is alert and fully cooperative with history & exam.  No sign of distress is noted during the visit.     Psychiatric: Affect is pleasant & appropriate.  Patient appears motivated to improve health.     Respiratory: Breathing is regular & unlabored while sitting.     HEENT: Hearing is intact to spoken word.  Speech is clear.  No gross evidence of visual impairment that would impact ambulation.     Vascular: DP & PT pulses are intact & regular bilaterally.  No significant edema or varicosities noted.  CFT and skin temperature is normal to both lower extremities.     Neurologic: Lower extremity sensation is intact to light touch.  No evidence of weakness or contracture in the lower extremities.  No evidence of neuropathy.    Dermatologic: Skin is intact to both lower extremities with adequate texture, turgor and tone about the integument.  No paronychia or evidence of soft tissue infection is noted.     Musculoskeletal: Patient is ambulatory without assistive device or brace.  No gross ankle deformity noted.  No foot or ankle joint effusion is noted.  Subtle talar bunion or prominence about the lateral fifth metatarsal head with symptoms on the left but not on the right.  No palpable bursa is noted directly over the prominence but rather about 1 cm proximal.  Most of this discomfort is plantarly not dorsally.  Manual muscle strength +5/5 to all 4 quadrants.  No pain along the neck or " shaft of the metatarsal dorsally no palpable edema or fluctuance is noted.    Radiographs: 3 views left foot demonstrate mildly elevated lateral deviation angle intermetatarsal angle of the fifth metatarsal.  Mild tailor's bunion deformity.     ASSESSMENT:       ICD-10-CM    1. Tailor's bunion of left foot M21.622    2. Bursitis of left foot M77.52         PLAN:  Reviewed patient's chart in [facility].      4/22/2019   This appears to be overuse associated with walking placement.  She does have subtle deformity and we discussed how to accommodate this.  We discussed injections and oral anti-inflammatories however within 30 days of her knee replacement she would rather not utilize these treatments and her symptoms are quite mild so that seems reasonable at this time.  If this remains symptomatic over the next month we may consider adding an injection or oral anti-inflammatories.  I suspect she can accommodate this quite well and continue activities as tolerated.  All questions were answered and she will follow-up in the next month or so if this remains symptomatic.    Bakari Montgomery DPM      Again, thank you for allowing me to participate in the care of your patient.        Sincerely,        Bakari Montgomery DPM

## 2019-04-22 NOTE — PATIENT INSTRUCTIONS
Reliable shoe stores: To maximize your experience and provide the best possible fit.  Be sure to show them your foot concerns and tell them Dr. Montgomery sent you.      Stores listed in bold have only athletic shoes, and stores that are not bold are mostly casual or variety of shoes    [employer]  [address] [place] [address]  [phone]    [employer] [place]  [address] [place] [address]  [phone]     [employer] [place]  [address] [place] [address]  [phone]    [employer]  [address] [facility]  [facility] [address]  [phone]    [employer]  [address] [place], [place] [address] [phone]    [employer]  [address] [place] [address]  [phone]                         [employer] Locations:     [address] [place] [address]   [phone]     [address] [place] [address] [address] [place] [address]   [phone]     [address] [place] [address]   [phone]     [address] [facility] [place] [address]   [phone]     [address] [place] [address]   [phone]     [address] [place] [address]   [phone]     [address] [facility] [address]   [place] [address]   [phone]     [address] [facility]   [place] [address]   [phone]             How to find reasonable shoes          The correct width    Correct Fitting    Correct Length      Foot Distortion    Posture Distortion                          Torsional Rigidity      Grasp behind the heel and underneath the foot and twist      Bad    Excessive torsion/twist in midfoot     Less torsion/twist in midfoot is better                   Heel Counter Rigidity      Grasp just above   midsole and squeeze      Bad    Soft heel counter      Good    Rigid Heel Counter      Flexion Rigidity      Grasp shoe and bend from forefoot to rearfoot

## 2019-04-26 ENCOUNTER — THERAPY VISIT (OUTPATIENT)
Dept: PHYSICAL THERAPY | Facility: CLINIC | Age: 64
End: 2019-04-26
Payer: COMMERCIAL

## 2019-04-26 DIAGNOSIS — Z47.1 AFTERCARE FOLLOWING RIGHT KNEE JOINT REPLACEMENT SURGERY: ICD-10-CM

## 2019-04-26 DIAGNOSIS — R26.9 ABNORMAL GAIT: Primary | ICD-10-CM

## 2019-04-26 DIAGNOSIS — R60.0 LOCALIZED EDEMA: ICD-10-CM

## 2019-04-26 DIAGNOSIS — Z96.651 AFTERCARE FOLLOWING RIGHT KNEE JOINT REPLACEMENT SURGERY: ICD-10-CM

## 2019-04-26 PROCEDURE — 97161 PT EVAL LOW COMPLEX 20 MIN: CPT | Mod: GP | Performed by: PHYSICAL THERAPIST

## 2019-04-26 PROCEDURE — 97110 THERAPEUTIC EXERCISES: CPT | Mod: GP | Performed by: PHYSICAL THERAPIST

## 2019-04-26 ASSESSMENT — ACTIVITIES OF DAILY LIVING (ADL)
HOW_WOULD_YOU_RATE_THE_OVERALL_FUNCTION_OF_YOUR_KNEE_DURING_YOUR_USUAL_DAILY_ACTIVITIES?: ABNORMAL
HOW_WOULD_YOU_RATE_THE_CURRENT_FUNCTION_OF_YOUR_KNEE_DURING_YOUR_USUAL_DAILY_ACTIVITIES_ON_A_SCALE_FROM_0_TO_100_WITH_100_BEING_YOUR_LEVEL_OF_KNEE_FUNCTION_PRIOR_TO_YOUR_INJURY_AND_0_BEING_THE_INABILITY_TO_PERFORM_ANY_OF_YOUR_USUAL_DAILY_ACTIVITIES?: 50
GO UP STAIRS: ACTIVITY IS SOMEWHAT DIFFICULT
STIFFNESS: THE SYMPTOM AFFECTS MY ACTIVITY MODERATELY
GO DOWN STAIRS: ACTIVITY IS SOMEWHAT DIFFICULT
RISE FROM A CHAIR: ACTIVITY IS MINIMALLY DIFFICULT
SIT WITH YOUR KNEE BENT: ACTIVITY IS MINIMALLY DIFFICULT
SWELLING: THE SYMPTOM AFFECTS MY ACTIVITY MODERATELY
AS_A_RESULT_OF_YOUR_KNEE_INJURY,_HOW_WOULD_YOU_RATE_YOUR_CURRENT_LEVEL_OF_DAILY_ACTIVITY?: ABNORMAL
LIMPING: I HAVE THE SYMPTOM BUT IT DOES NOT AFFECT MY ACTIVITY
KNEE_ACTIVITY_OF_DAILY_LIVING_SCORE: 57.14
KNEE_ACTIVITY_OF_DAILY_LIVING_SUM: 40
WEAKNESS: THE SYMPTOM AFFECTS MY ACTIVITY SLIGHTLY
GIVING WAY, BUCKLING OR SHIFTING OF KNEE: I DO NOT HAVE THE SYMPTOM
STAND: ACTIVITY IS SOMEWHAT DIFFICULT
KNEEL ON THE FRONT OF YOUR KNEE: I AM UNABLE TO DO THE ACTIVITY
PAIN: THE SYMPTOM AFFECTS MY ACTIVITY MODERATELY
SQUAT: ACTIVITY IS FAIRLY DIFFICULT
RAW_SCORE: 40
WALK: ACTIVITY IS SOMEWHAT DIFFICULT

## 2019-04-26 NOTE — PROGRESS NOTES
[facility] Initial Evaluation  Subjective:  The history is provided by the patient. No  was used.   Nisha Perez is a 63 year old female with a right knee condition.  Condition occurred with:  Repetition/overuse and degenerative joint disease.  Condition occurred: other.  This is a chronic condition  S/P R TKA 3/28/19.    Patient reports pain:  In the joint, anterior and posterior.    Pain is described as aching and sharp and is intermittent and reported as 4/10.  Associated symptoms:  Edema, loss of motion/stiffness, loss of strength and numbness. Pain is the same all the time.  Symptoms are exacerbated by walking and transfers (avoiding squatting) and relieved by rest, ice and analgesics.  Since onset symptoms are gradually improving.  Special tests:  X-ray.  Previous treatment includes physical therapy (in home and in hospital).  There was moderate improvement following previous treatment.  General health as reported by patient is good.  Pertinent medical history includes:  Osteoarthritis and menopausal.  Medical allergies: yes.  Other surgeries include:  Orthopedic surgery (L MCL repair and L lateral partial knee replacemet).  Current medications:  Sleep medication, pain medication and anti-inflammatory.  Current occupation is None; Lives at home w/  and 1 cat; Hobbies include kayaking, gardening, caring for grandkids (ages 1-13).    Primary job tasks include:  Lifting.    Barriers include:  Transportation (1 step).    Red flags:  None as reported by the patient.                        Objective:    Gait:    Gait Type:  Antalgic   Assistive Devices:  None                                                   Hip Evaluation    Hip Strength:    Flexion:   Left: 4/5   Pain:  Right: 3/5   Pain:                    Extension:  Left: 4/5  Pain:Right: 3/5    Pain:    Abduction:  Left: 3/5     Pain:Right: 3+/5    Pain:                           Knee Evaluation:  ROM:     AROM    Hyperextension:  Left:  0    Right: 0  Extension:  Left: 0    Right:  0  Flexion: Left: 145    Right: 95 (103 after ROM exercises)        Strength:       Flexion:  Left: 4/5   Pain:      Right: 2/5   Pain:    Quad Set Left: Good    Pain:   Quad Set Right: Fair and delayed    Pain:        Edema:  Edema of the knee: edema visually noted in R anterior knee             General     ROS    Assessment/Plan:    Patient is a 63 year old female with right side knee complaints.    Patient has the following significant findings with corresponding treatment plan.                Diagnosis 1:  S/P R TKA  Pain -  hot/cold therapy, manual therapy, splint/taping/bracing/orthotics, self management, education and home program  Decreased ROM/flexibility - manual therapy, therapeutic exercise, therapeutic activity and home program  Decreased strength - therapeutic exercise, therapeutic activities and home program  Impaired balance - neuro re-education, therapeutic activities and home program  Edema - vasopneumatics and self management/home program  Impaired gait - gait training and home program  Impaired muscle performance - neuro re-education and home program  Decreased function - therapeutic activities and home program    Therapy Evaluation Codes:   1) History comprised of:   Personal factors that impact the plan of care:      Age, Past/current experiences and Time since onset of symptoms.    Comorbidity factors that impact the plan of care are:      Menopausal and Osteoarthritis.     Medications impacting care: Anti-inflammatory, Pain and Sleep.  2) Examination of Body Systems comprised of:   Body structures and functions that impact the plan of care:      Knee.   Activity limitations that impact the plan of care are:      Bathing, Bending, Driving, Sitting, Squatting/kneeling, Stairs, Standing, Walking, Sleeping and Laying down.  3) Clinical presentation characteristics  are:   Stable/Uncomplicated.  4) Decision-Making    Low complexity using standardized patient assessment instrument and/or measureable assessment of functional outcome.  Cumulative Therapy Evaluation is: Low complexity.    Previous and current functional limitations:  (See Goal Flow Sheet for this information)    Short term and Long term goals: (See Goal Flow Sheet for this information)     Communication ability:  Patient appears to be able to clearly communicate and understand verbal and written communication and follow directions correctly.  Treatment Explanation - The following has been discussed with the patient:   RX ordered/plan of care  Anticipated outcomes  Possible risks and side effects  This patient would benefit from PT intervention to resume normal activities.   Rehab potential is excellent.    Frequency:  2 X week, once daily  Duration:  for 3 weeks tapering to 1 X a week over 6 weeks  Discharge Plan:  Achieve all LTG.  Independent in home treatment program.  Reach maximal therapeutic benefit.    Please refer to the daily flowsheet for treatment today, total treatment time and time spent performing 1:1 timed codes.

## 2019-04-26 NOTE — LETTER
[facility]  [address]  [facility] [id number] [phone]    2019    Re: Nisha Perez   :   1955  MRN:  [id number]   REFERRING PHYSICIAN:   Monie Dai    [facility]    Date of Initial Evaluation:  ***  Visits:  Rxs Used: 1  Reason for Referral:     Aftercare following right knee joint replacement surgery  Localized edema  Abnormal gait    EVALUATION SUMMARY    [facility] Initial Evaluation  Subjective:  The history is provided by the patient. No  was used.   Nisha Perez is a 63 year old female with a right knee condition.  Condition occurred with:  Repetition/overuse and degenerative joint disease.  Condition occurred: other.  This is a chronic condition  S/P R TKA 3/28/19.    Patient reports pain:  In the joint, anterior and posterior.    Pain is described as aching and sharp and is intermittent and reported as 4/10.  Associated symptoms:  Edema, loss of motion/stiffness, loss of strength and numbness. Pain is the same all the time.  Symptoms are exacerbated by walking and transfers (avoiding squatting) and relieved by rest, ice and analgesics.  Since onset symptoms are gradually improving.  Special tests:  X-ray.  Previous treatment includes physical therapy (in home and in hospital).  There was moderate improvement following previous treatment.  General health as reported by patient is good.  Pertinent medical history includes:  Osteoarthritis and menopausal.  Medical allergies: yes.  Other surgeries include:  Orthopedic surgery (L MCL repair and L lateral partial knee replacemet).  Current medications:  Sleep medication, pain medication and anti-inflammatory.  Current occupation is None; Lives at home w/  and 1 cat; Hobbies include kayaking, gardening, caring for grandkids (ages 1-13).    Primary job tasks include:   Lifting.    Barriers include:  Transportation (1 step).    Red flags:  None as reported by the patient.                        Objective:    Gait:    Gait Type:  Antalgic   Assistive Devices:  None                                                   Hip Evaluation    Hip Strength:    Flexion:   Left: 4/5   Pain:  Right: 3/5   Pain:                    Extension:  Left: 4/5  Pain:Right: 3/5    Pain:    Abduction:  Left: 3/5     Pain:Right: 3+/5    Pain:                           Knee Evaluation:  ROM:    AROM    Hyperextension:  Left:  0    Right: 0  Extension:  Left: 0    Right:  0  Flexion: Left: 145    Right: 95 (103 after ROM exercises)        Strength:       Flexion:  Left: 4/5   Pain:      Right: 2/5   Pain:    Quad Set Left: Good    Pain:   Quad Set Right: Fair and delayed    Pain:        Edema:  Edema of the knee: edema visually noted in R anterior knee             General     ROS    Assessment/Plan:    Patient is a 63 year old female with right side knee complaints.    Patient has the following significant findings with corresponding treatment plan.                Diagnosis 1:  S/P R TKA  Pain -  hot/cold therapy, manual therapy, splint/taping/bracing/orthotics, self management, education and home program  Decreased ROM/flexibility - manual therapy, therapeutic exercise, therapeutic activity and home program  Decreased strength - therapeutic exercise, therapeutic activities and home program  Impaired balance - neuro re-education, therapeutic activities and home program  Edema - vasopneumatics and self management/home program  Impaired gait - gait training and home program  Impaired muscle performance - neuro re-education and home program  Decreased function - therapeutic activities and home program    Therapy Evaluation Codes:   1) History comprised of:   Personal factors that impact the plan of care:      Age, Past/current experiences and Time since onset of symptoms.    Comorbidity factors that impact the  plan of care are:      Menopausal and Osteoarthritis.     Medications impacting care: Anti-inflammatory, Pain and Sleep.  2) Examination of Body Systems comprised of:   Body structures and functions that impact the plan of care:      Knee.   Activity limitations that impact the plan of care are:      Bathing, Bending, Driving, Sitting, Squatting/kneeling, Stairs, Standing, Walking, Sleeping and Laying down.  3) Clinical presentation characteristics are:   Stable/Uncomplicated.  4) Decision-Making    Low complexity using standardized patient assessment instrument and/or measureable assessment of functional outcome.  Cumulative Therapy Evaluation is: Low complexity.    Previous and current functional limitations:  (See Goal Flow Sheet for this information)    Short term and Long term goals: (See Goal Flow Sheet for this information)     Communication ability:  Patient appears to be able to clearly communicate and understand verbal and written communication and follow directions correctly.  Treatment Explanation - The following has been discussed with the patient:   RX ordered/plan of care  Anticipated outcomes  Possible risks and side effects  This patient would benefit from PT intervention to resume normal activities.   Rehab potential is excellent.    Frequency:  2 X week, once daily  Duration:  for 3 weeks tapering to 1 X a week over 6 weeks  Discharge Plan:  Achieve all LTG.  Independent in home treatment program.  Reach maximal therapeutic benefit.      Thank you for your referral.    INQUIRIES  Therapist:   [facility]  [address]  [facility] [address]  Phone: [phone]  Fax: [phone]

## 2019-04-27 PROBLEM — R60.0 LOCALIZED EDEMA: Status: ACTIVE | Noted: 2019-04-27

## 2019-04-27 PROBLEM — Z47.1 AFTERCARE FOLLOWING RIGHT KNEE JOINT REPLACEMENT SURGERY: Status: ACTIVE | Noted: 2019-04-27

## 2019-04-27 PROBLEM — Z96.651 AFTERCARE FOLLOWING RIGHT KNEE JOINT REPLACEMENT SURGERY: Status: ACTIVE | Noted: 2019-04-27

## 2019-04-30 ENCOUNTER — THERAPY VISIT (OUTPATIENT)
Dept: PHYSICAL THERAPY | Facility: CLINIC | Age: 64
End: 2019-04-30
Payer: COMMERCIAL

## 2019-04-30 DIAGNOSIS — Z96.651 AFTERCARE FOLLOWING RIGHT KNEE JOINT REPLACEMENT SURGERY: ICD-10-CM

## 2019-04-30 DIAGNOSIS — R60.0 LOCALIZED EDEMA: ICD-10-CM

## 2019-04-30 DIAGNOSIS — R26.9 ABNORMAL GAIT: ICD-10-CM

## 2019-04-30 DIAGNOSIS — Z47.1 AFTERCARE FOLLOWING RIGHT KNEE JOINT REPLACEMENT SURGERY: ICD-10-CM

## 2019-04-30 PROCEDURE — 97110 THERAPEUTIC EXERCISES: CPT | Mod: GP | Performed by: PHYSICAL THERAPIST

## 2019-04-30 PROCEDURE — 97140 MANUAL THERAPY 1/> REGIONS: CPT | Mod: GP | Performed by: PHYSICAL THERAPIST

## 2019-05-02 ENCOUNTER — THERAPY VISIT (OUTPATIENT)
Dept: PHYSICAL THERAPY | Facility: CLINIC | Age: 64
End: 2019-05-02
Payer: COMMERCIAL

## 2019-05-02 DIAGNOSIS — Z47.1 AFTERCARE FOLLOWING RIGHT KNEE JOINT REPLACEMENT SURGERY: ICD-10-CM

## 2019-05-02 DIAGNOSIS — Z96.651 AFTERCARE FOLLOWING RIGHT KNEE JOINT REPLACEMENT SURGERY: ICD-10-CM

## 2019-05-02 DIAGNOSIS — R26.9 ABNORMAL GAIT: ICD-10-CM

## 2019-05-02 DIAGNOSIS — R60.0 LOCALIZED EDEMA: ICD-10-CM

## 2019-05-02 PROCEDURE — 97140 MANUAL THERAPY 1/> REGIONS: CPT | Mod: GP | Performed by: PHYSICAL THERAPIST

## 2019-05-02 PROCEDURE — 97110 THERAPEUTIC EXERCISES: CPT | Mod: GP | Performed by: PHYSICAL THERAPIST

## 2019-05-03 ENCOUNTER — OFFICE VISIT (OUTPATIENT)
Dept: ORTHOPEDICS | Facility: CLINIC | Age: 64
End: 2019-05-03
Payer: COMMERCIAL

## 2019-05-03 DIAGNOSIS — Z96.651 S/P TOTAL KNEE ARTHROPLASTY, RIGHT: Primary | ICD-10-CM

## 2019-05-03 DIAGNOSIS — Z98.890 STATUS POST KNEE SURGERY: ICD-10-CM

## 2019-05-03 RX ORDER — HYDROMORPHONE HYDROCHLORIDE 2 MG/1
2 TABLET ORAL EVERY 8 HOURS PRN
Qty: 10 TABLET | Refills: 0 | Status: SHIPPED | OUTPATIENT
Start: 2019-05-03 | End: 2019-06-13

## 2019-05-03 NOTE — PROGRESS NOTES
Reason for visit:    Nisha Perez came in to the clinic for a two week post op check.    Her surgery was done on 3/28/2019 by Dr Dai.  She had a right TKA     Assessment:    Nisha came into the clinic in walking with no assistive device.    The Surgical wounds were exposed and found to be well-healed; so the Dermabond was removed and a steri strip was placed over one spot that was still needing to heal.     She has started out patient PT and her ROM was 0-115 at her last visit.      She is still taking 2-3 Diluadid a day with her exercises and at night before bed.  It was discussed today to step down to another pain medication and Jonathan Easton PA-C will be consulted.   He decided to give her 10 more pills of the Dilaudid and have her start to step down to 1 pill at night and maybe 1/2 pill before PT.  She will have no more refills after this.     Plan:     She has an appointment to see Dr. Dai at that time Dr. Dai will determine further restrictions.    She has our phone number and will call with questions or problems.

## 2019-05-06 ENCOUNTER — TELEPHONE (OUTPATIENT)
Dept: ORTHOPEDICS | Facility: CLINIC | Age: 64
End: 2019-05-06

## 2019-05-06 ENCOUNTER — THERAPY VISIT (OUTPATIENT)
Dept: PHYSICAL THERAPY | Facility: CLINIC | Age: 64
End: 2019-05-06
Payer: COMMERCIAL

## 2019-05-06 DIAGNOSIS — Z96.651 AFTERCARE FOLLOWING RIGHT KNEE JOINT REPLACEMENT SURGERY: ICD-10-CM

## 2019-05-06 DIAGNOSIS — R60.0 LOCALIZED EDEMA: ICD-10-CM

## 2019-05-06 DIAGNOSIS — R26.9 ABNORMAL GAIT: ICD-10-CM

## 2019-05-06 DIAGNOSIS — Z47.1 AFTERCARE FOLLOWING RIGHT KNEE JOINT REPLACEMENT SURGERY: ICD-10-CM

## 2019-05-06 PROCEDURE — 97110 THERAPEUTIC EXERCISES: CPT | Mod: GP | Performed by: PHYSICAL THERAPIST

## 2019-05-06 PROCEDURE — 97140 MANUAL THERAPY 1/> REGIONS: CPT | Mod: GP | Performed by: PHYSICAL THERAPIST

## 2019-05-06 PROCEDURE — 97530 THERAPEUTIC ACTIVITIES: CPT | Mod: GP | Performed by: PHYSICAL THERAPIST

## 2019-05-06 NOTE — TELEPHONE ENCOUNTER
Patient called to cancel up coming surgery with Dr. Salgado. She says she would like to reschedule to the Fall. She Will contact us in August when she wishes to reschedule.

## 2019-05-09 ENCOUNTER — ALLIED HEALTH/NURSE VISIT (OUTPATIENT)
Dept: FAMILY MEDICINE | Facility: CLINIC | Age: 64
End: 2019-05-09
Payer: COMMERCIAL

## 2019-05-09 ENCOUNTER — THERAPY VISIT (OUTPATIENT)
Dept: PHYSICAL THERAPY | Facility: CLINIC | Age: 64
End: 2019-05-09
Payer: COMMERCIAL

## 2019-05-09 DIAGNOSIS — R60.0 LOCALIZED EDEMA: ICD-10-CM

## 2019-05-09 DIAGNOSIS — Z96.651 AFTERCARE FOLLOWING RIGHT KNEE JOINT REPLACEMENT SURGERY: ICD-10-CM

## 2019-05-09 DIAGNOSIS — R26.9 ABNORMAL GAIT: ICD-10-CM

## 2019-05-09 DIAGNOSIS — H93.90 EAR PROBLEM: Primary | ICD-10-CM

## 2019-05-09 DIAGNOSIS — Z47.1 AFTERCARE FOLLOWING RIGHT KNEE JOINT REPLACEMENT SURGERY: ICD-10-CM

## 2019-05-09 PROCEDURE — 97530 THERAPEUTIC ACTIVITIES: CPT | Mod: GP | Performed by: PHYSICAL THERAPIST

## 2019-05-09 PROCEDURE — 97110 THERAPEUTIC EXERCISES: CPT | Mod: GP | Performed by: PHYSICAL THERAPIST

## 2019-05-09 PROCEDURE — 97140 MANUAL THERAPY 1/> REGIONS: CPT | Mod: GP | Performed by: PHYSICAL THERAPIST

## 2019-05-09 PROCEDURE — 99207 ZZC NO CHARGE NURSE ONLY: CPT

## 2019-05-13 ENCOUNTER — THERAPY VISIT (OUTPATIENT)
Dept: PHYSICAL THERAPY | Facility: CLINIC | Age: 64
End: 2019-05-13
Payer: COMMERCIAL

## 2019-05-13 DIAGNOSIS — R26.9 ABNORMAL GAIT: ICD-10-CM

## 2019-05-13 DIAGNOSIS — Z47.1 AFTERCARE FOLLOWING RIGHT KNEE JOINT REPLACEMENT SURGERY: ICD-10-CM

## 2019-05-13 DIAGNOSIS — R60.0 LOCALIZED EDEMA: ICD-10-CM

## 2019-05-13 DIAGNOSIS — Z96.651 AFTERCARE FOLLOWING RIGHT KNEE JOINT REPLACEMENT SURGERY: ICD-10-CM

## 2019-05-13 PROCEDURE — 97110 THERAPEUTIC EXERCISES: CPT | Mod: GP | Performed by: PHYSICAL THERAPIST

## 2019-05-13 PROCEDURE — 97140 MANUAL THERAPY 1/> REGIONS: CPT | Mod: GP | Performed by: PHYSICAL THERAPIST

## 2019-05-13 PROCEDURE — 97112 NEUROMUSCULAR REEDUCATION: CPT | Mod: GP | Performed by: PHYSICAL THERAPIST

## 2019-05-16 ENCOUNTER — THERAPY VISIT (OUTPATIENT)
Dept: PHYSICAL THERAPY | Facility: CLINIC | Age: 64
End: 2019-05-16
Payer: COMMERCIAL

## 2019-05-16 DIAGNOSIS — Z47.1 AFTERCARE FOLLOWING RIGHT KNEE JOINT REPLACEMENT SURGERY: ICD-10-CM

## 2019-05-16 DIAGNOSIS — Z96.651 AFTERCARE FOLLOWING RIGHT KNEE JOINT REPLACEMENT SURGERY: ICD-10-CM

## 2019-05-16 DIAGNOSIS — R26.9 ABNORMAL GAIT: ICD-10-CM

## 2019-05-16 DIAGNOSIS — R60.0 LOCALIZED EDEMA: ICD-10-CM

## 2019-05-16 PROCEDURE — 97110 THERAPEUTIC EXERCISES: CPT | Mod: GP

## 2019-05-16 PROCEDURE — 97112 NEUROMUSCULAR REEDUCATION: CPT | Mod: GP

## 2019-05-16 PROCEDURE — 97140 MANUAL THERAPY 1/> REGIONS: CPT | Mod: GP

## 2019-05-22 ENCOUNTER — THERAPY VISIT (OUTPATIENT)
Dept: PHYSICAL THERAPY | Facility: CLINIC | Age: 64
End: 2019-05-22
Payer: COMMERCIAL

## 2019-05-22 DIAGNOSIS — Z47.1 AFTERCARE FOLLOWING RIGHT KNEE JOINT REPLACEMENT SURGERY: ICD-10-CM

## 2019-05-22 DIAGNOSIS — R26.9 ABNORMAL GAIT: ICD-10-CM

## 2019-05-22 DIAGNOSIS — R60.0 LOCALIZED EDEMA: ICD-10-CM

## 2019-05-22 DIAGNOSIS — Z96.651 AFTERCARE FOLLOWING RIGHT KNEE JOINT REPLACEMENT SURGERY: ICD-10-CM

## 2019-05-22 PROCEDURE — 97140 MANUAL THERAPY 1/> REGIONS: CPT | Mod: GP | Performed by: PHYSICAL THERAPIST

## 2019-05-22 PROCEDURE — 97530 THERAPEUTIC ACTIVITIES: CPT | Mod: GP | Performed by: PHYSICAL THERAPIST

## 2019-05-22 PROCEDURE — 97110 THERAPEUTIC EXERCISES: CPT | Mod: GP | Performed by: PHYSICAL THERAPIST

## 2019-05-22 NOTE — PROGRESS NOTES
"Subjective:  HPI                    Objective:  System    Physical Exam    General     ROS    Assessment/Plan:    PROGRESS  REPORT    Progress reporting period is from 4/26/19 to 5/22/19.       SUBJECTIVE  Subjective changes noted by patient:  .  Subjective: Pt reports she tolerated working in her garden for a couple of hours yesterday, states is stiff today. No new complaints. Pain today rated at 2-3/10 level     Current pain level is 2/10 Current Pain level: (2-3/10 ).     Previous pain level was  3/10 Initial Pain level: 4/10.   Changes in function:  Yes (See Goal flowsheet attached for changes in current functional level)  Adverse reaction to treatment or activity: None    OBJECTIVE  Changes noted in objective findings:    Objective: Gait with mild limp and list remaining on the R side. ROM today flexion to 115 (heel slide), passive to 122, extension to -1. Patellar mobility normal medial, lateral and inferior, mild tightness superior glide, Scar mobility near normal. Girth at knee joint line R 40 cm, L 36cm. Quad eccentric control good to 4\" retro step down, concentric control to 6\".      ASSESSMENT/PLAN  Updated problem list and treatment plan: Diagnosis 1:  S/p TKA R   Pain -  hot/cold therapy, self management, education, directional preference exercise and home program  Decreased ROM/flexibility - manual therapy, therapeutic exercise and home program  Decreased joint mobility - manual therapy, therapeutic exercise and home program  Decreased strength - therapeutic exercise, therapeutic activities and home program  Inflammation - cold therapy and self management/home program  Impaired gait - gait training and home program  Impaired muscle performance - neuro re-education and home program  Decreased function - therapeutic activities and home program  STG/LTGs have been met or progress has been made towards goals:  Yes (See Goal flow sheet completed today.)  Assessment of Progress: The patient's condition is " improving.  The patient's condition has potential to improve.  Self Management Plans:  Patient has been instructed in a home treatment program.  Patient  has been instructed in self management of symptoms.    Nisha continues to require the following intervention to meet STG and LTG's:  PT    Recommendations:  This patient would benefit from continued therapy.     Frequency:  1 X week, once daily  Duration:  for 4 weeks        Please refer to the daily flowsheet for treatment today, total treatment time and time spent performing 1:1 timed codes.

## 2019-05-23 DIAGNOSIS — Z96.651 S/P TOTAL KNEE ARTHROPLASTY, RIGHT: Primary | ICD-10-CM

## 2019-05-28 ENCOUNTER — ANCILLARY PROCEDURE (OUTPATIENT)
Dept: GENERAL RADIOLOGY | Facility: CLINIC | Age: 64
End: 2019-05-28
Attending: ORTHOPAEDIC SURGERY
Payer: COMMERCIAL

## 2019-05-28 ENCOUNTER — OFFICE VISIT (OUTPATIENT)
Dept: ORTHOPEDICS | Facility: CLINIC | Age: 64
End: 2019-05-28
Payer: COMMERCIAL

## 2019-05-28 DIAGNOSIS — Z98.890 S/P KNEE SURGERY: Primary | ICD-10-CM

## 2019-05-28 DIAGNOSIS — Z96.651 S/P TOTAL KNEE ARTHROPLASTY, RIGHT: ICD-10-CM

## 2019-05-28 ASSESSMENT — ENCOUNTER SYMPTOMS
BACK PAIN: 0
HOT FLASHES: 1
STIFFNESS: 1
MUSCLE WEAKNESS: 0
MUSCLE CRAMPS: 0
ARTHRALGIAS: 1
NECK PAIN: 0
JOINT SWELLING: 0
MYALGIAS: 0

## 2019-05-28 NOTE — PROGRESS NOTES
Date of surgery: 3/28/2019.  Procedure: Right total knee arthroplasty.    HPI: The patient is a 63-year-old female now about 9 weeks status post the above procedure.  She reports that she has been doing well.  She continues to work with physical therapy.  She no longer is taking any narcotic pain medication.  She does take an Advil a few times per week to help with pain.  She has mild swelling and stiffness of the right knee.  She reports she has been able to flex to 122 degrees at physical therapy.  She is happy with her progress, and very happy had the procedure.    (Important to note that she has a knee manipulation in Left 2008 following an MPFL/TTO.  She regained motion to 150 on the left after a Left Patellofemoral arthroplasty w/out surgical intervention).    Exam:  General: Well-appearing 63-year-old female, no apparent distress.  Respiratory: Breathing unlabored on room air, no wheezing.  Cardiovascular: Extremities are warm and well-perfused, no peripheral edema.  Right lower extremity: Focused examination reveals no deformity.  The surgical incision is well-healed without erythema, dehiscence, or drainage.  No significant knee effusion.  Knee range of motion is full extension to 108 degrees of flexion.  Knee is stable to varus and valgus stress at 0 and 30 degrees of flexion.  Stable to anterior and posterior stress at 90 degrees of flexion.  Distally neurovascularly intact.    Imaging: 3 views of the right knee from today's date are reviewed and reveal a well fixed and well aligned right total knee arthroplasty without evidence of fracture or loosening.    Assessment: 63-year-old female 9 weeks status post right total knee arthroplasty, doing well postoperatively.    Plan: We had a good discussion with patient about her findings today.  We would like her to continue to work on range of motion of the right knee.  She is confident she will be able to attain increased range of motion by working on a  stationary bike as well as her day-to-day activities.      She can continue physical therapy if she feels it is helpful, otherwise she can discontinue.  She will follow-up in a few months if she is having difficulty obtaining full range of motion.      Otherwise she will follow-up at the one-year anniversary after her surgery.  She was in agreement with this plan, and all questions were answered.    The patient was seen and discussed with staff surgeon Dr. Dai who was in agreement with the above assessment and plan.     Demond Buck MD  PGY-4  Orthopaedic Surgery  [phone]    I have personally examined this patient and have reviewed the clinical presentation and progress note with the resident.  I agree with the treatment plan as outlined.  The plan was formulated with the resident on the day of the resident dictation.    Monie Dai MD  Answers for HPI/ROS submitted by the patient on 5/28/2019   General Symptoms: No  Skin Symptoms: No  HENT Symptoms: No  EYE SYMPTOMS: No  HEART SYMPTOMS: No  LUNG SYMPTOMS: No  INTESTINAL SYMPTOMS: No  URINARY SYMPTOMS: No  GYNECOLOGIC SYMPTOMS: Yes  BREAST SYMPTOMS: No  SKELETAL SYMPTOMS: Yes  BLOOD SYMPTOMS: No  NERVOUS SYSTEM SYMPTOMS: No  MENTAL HEALTH SYMPTOMS: No  Back pain: No  Muscle aches: No  Neck pain: No  Swollen joints: No  Joint pain: Yes  Bone pain: No  Muscle cramps: No  Muscle weakness: No  Joint stiffness: Yes  Bone fracture: No  Bleeding or spotting between periods: No  Heavy or painful periods: No  Irregular periods: No  Vaginal discharge: No  Hot flashes: Yes

## 2019-05-28 NOTE — LETTER
5/28/2019       RE: Nisha Perez  [address] [place] [id number]     Dear Colleague,    Thank you for referring your patient, Nisha Perez, to [facility]. Please see a copy of my visit note below.    Date of surgery: 3/28/2019.  Procedure: Right total knee arthroplasty.    HPI: The patient is a 63-year-old female now about 9 weeks status post the above procedure.  She reports that she has been doing well.  She continues to work with physical therapy.  She no longer is taking any narcotic pain medication.  She does take an Advil a few times per week to help with pain.  She has mild swelling and stiffness of the right knee.  She reports she has been able to flex to 122 degrees at physical therapy.  She is happy with her progress, and very happy had the procedure.    (Important to note that she has a knee manipulation in Left 2008 following an MPFL/TTO.  She regained motion to 150 on the left after a Left Patellofemoral arthroplasty w/out surgical intervention).    Exam:  General: Well-appearing 63-year-old female, no apparent distress.  Respiratory: Breathing unlabored on room air, no wheezing.  Cardiovascular: Extremities are warm and well-perfused, no peripheral edema.  Right lower extremity: Focused examination reveals no deformity.  The surgical incision is well-healed without erythema, dehiscence, or drainage.  No significant knee effusion.  Knee range of motion is full extension to 108 degrees of flexion.  Knee is stable to varus and valgus stress at 0 and 30 degrees of flexion.  Stable to anterior and posterior stress at 90 degrees of flexion.  Distally neurovascularly intact.    Imaging: 3 views of the right knee from today's date are reviewed and reveal a well fixed and well aligned right total knee arthroplasty without evidence of fracture or loosening.    Assessment: 63-year-old female 9 weeks status post right total knee  arthroplasty, doing well postoperatively.    Plan: We had a good discussion with patient about her findings today.  We would like her to continue to work on range of motion of the right knee.  She is confident she will be able to attain increased range of motion by working on a stationary bike as well as her day-to-day activities.      She can continue physical therapy if she feels it is helpful, otherwise she can discontinue.  She will follow-up in a few months if she is having difficulty obtaining full range of motion.      Otherwise she will follow-up at the one-year anniversary after her surgery.  She was in agreement with this plan, and all questions were answered.    The patient was seen and discussed with staff surgeon Dr. Dai who was in agreement with the above assessment and plan.     Demond Buck MD  PGY-4  Orthopaedic Surgery  [phone]    I have personally examined this patient and have reviewed the clinical presentation and progress note with the resident.  I agree with the treatment plan as outlined.  The plan was formulated with the resident on the day of the resident dictation.    Monie Dai MD

## 2019-05-28 NOTE — NURSING NOTE
Reason For Visit:   Chief Complaint   Patient presents with     RECHECK     DOS 3/28/19 Right TKA       Primary MD: Emerson Spear    ?  No  Currently working? No.  Work status?  Retired.  Date of surgery: 3/28/19  Type of surgery: Right TKA.  Smoker: No  Request smoking cessation information: No    There were no vitals taken for this visit.    Pain Assessment  Patient Currently in Pain: Yes  0-10 Pain Scale: 2  Primary Pain Location: Knee(Right)  Pain Descriptors: Tightness, Aching          Day Cota ATC

## 2019-06-03 ENCOUNTER — TELEPHONE (OUTPATIENT)
Dept: FAMILY MEDICINE | Facility: OTHER | Age: 64
End: 2019-06-03

## 2019-06-03 DIAGNOSIS — Z12.31 VISIT FOR SCREENING MAMMOGRAM: Primary | ICD-10-CM

## 2019-06-03 NOTE — TELEPHONE ENCOUNTER
Spoke to patient and scheduled appointment. Patient was also requesting that JM put a note in her chart stating that she can have her ears cleaned on the float schedule PRN. She had them cleaned on 5/9/19 at [place]. No need to call patient back if this is able to be done.  Michaela Ruby, [facility]

## 2019-06-03 NOTE — TELEPHONE ENCOUNTER
Reason for Call: Request for an order or referral:    Order or referral being requested: Yearly Mammo    Date needed: as soon as possible    Has the patient been seen by the PCP for this problem? YES    Additional comments: Patient needs her yearly mammogram. Need an order to schedule this.     Phone number Patient can be reached at:  Home number on file [phone] (home)    Best Time:  any    Can we leave a detailed message on this number?  YES    Call taken on 6/3/2019 at 11:54 AM by Laura Tse

## 2019-06-06 PROBLEM — M18.0 PRIMARY OSTEOARTHRITIS OF BOTH FIRST CARPOMETACARPAL JOINTS: Status: RESOLVED | Noted: 2019-02-21 | Resolved: 2019-06-06

## 2019-06-06 PROBLEM — M79.645 BILATERAL THUMB PAIN: Status: RESOLVED | Noted: 2019-02-21 | Resolved: 2019-06-06

## 2019-06-06 PROBLEM — M79.644 BILATERAL THUMB PAIN: Status: RESOLVED | Noted: 2019-02-21 | Resolved: 2019-06-06

## 2019-06-11 ENCOUNTER — OFFICE VISIT (OUTPATIENT)
Dept: GASTROENTEROLOGY | Facility: CLINIC | Age: 64
End: 2019-06-11
Attending: PHYSICIAN ASSISTANT
Payer: COMMERCIAL

## 2019-06-11 VITALS
DIASTOLIC BLOOD PRESSURE: 97 MMHG | HEART RATE: 73 BPM | WEIGHT: 124.2 LBS | SYSTOLIC BLOOD PRESSURE: 167 MMHG | BODY MASS INDEX: 26.8 KG/M2 | OXYGEN SATURATION: 98 % | HEIGHT: 57 IN

## 2019-06-11 DIAGNOSIS — K21.9 GASTROESOPHAGEAL REFLUX DISEASE, ESOPHAGITIS PRESENCE NOT SPECIFIED: Primary | ICD-10-CM

## 2019-06-11 PROCEDURE — 99204 OFFICE O/P NEW MOD 45 MIN: CPT | Performed by: INTERNAL MEDICINE

## 2019-06-11 RX ORDER — ESOMEPRAZOLE MAGNESIUM 40 MG/1
40 CAPSULE, DELAYED RELEASE ORAL
COMMUNITY

## 2019-06-11 ASSESSMENT — MIFFLIN-ST. JEOR: SCORE: 992.88

## 2019-06-11 ASSESSMENT — PAIN SCALES - GENERAL: PAINLEVEL: MILD PAIN (2)

## 2019-06-11 NOTE — PATIENT INSTRUCTIONS
Schedule EGD with MAC     Get pre-op with Emerson Spear within 30 days prior     Continue nexium daily.

## 2019-06-11 NOTE — PROGRESS NOTES
GASTROENTEROLOGY NEW PATIENT CLINIC VISIT    CC/REFERRING MD:    Emerson Spear    REASON FOR CONSULTATION:   Emerson Spear for   Chief Complaint   Patient presents with     Consult     GERD     HISTORY OF PRESENT ILLNESS:    Nisha Perez is 63 year old female who presents for evaluation of GERD.  She notes that she has had long-standing symptoms of GERD.  She reports that for at least the past 8 years she has been on PPI therapy.  She is on minimal to no relief with omeprazole twice daily pantoprazole, lansoprazole, AcipHex.  She was previously prescribed Dexilant and this provided moderate relief of symptoms to where she was only having breakthrough reflux events a few times monthly.  She notes that this was not covered by insurance so she was paying out of pocket for and it was very expensive.  Recently, she has changed to over-the-counter Nexium and she notes that this is providing moderate relief of symptoms.  When she has breakthrough symptoms, she uses Gaviscon on an as-needed basis.  She did undergo upper endoscopy 8 years ago and she reports this was unremarkable (records are unavailable.)  She notes that her symptoms are heartburn and burning in the throat.  She does not get regurgitation.  She does not get nocturnal symptoms.  She does get increased symptoms when eating tomato based foods.  She notes that she has had some nausea since a recent knee repair and she is taking Advil daily.  She is not on narcotic pain medication.  She notes that she has a daughter with celiac disease.  The patient reports that she currently eats a gluten-free diet and this is provided some relief of joint pain symptoms.        PROBLEM LIST  Patient Active Problem List    Diagnosis Date Noted     Aftercare following right knee joint replacement surgery 04/27/2019     Priority: Medium     Localized edema 04/27/2019     Priority: Medium     Status post knee surgery 03/28/2019     Priority: Medium     Phobia,  flying 2017     Priority: Medium     Gastroesophageal reflux disease without esophagitis 2017     Priority: Medium     Advanced directives, counseling/discussion 2015     Priority: Medium     Patient has a health directive--copy to be obtained as able.  Roderick Hunter MD         Anxiety disorder      Priority: Medium     Subluxation of patella, acquired 2011     Priority: Medium     Aftercare following joint replacement 2011     Priority: Medium     Knee joint replacement by other means 2011     Priority: Medium     Abnormal gait 2011     Priority: Medium     iamJOINT DERANGEMENT NEC-L/LEG 2006     Priority: Medium       PERTINENT PAST MEDICAL HISTORY:  (I personally reviewed this history with the patient at today's visit)   Past Medical History:   Diagnosis Date     Anxiety disorder      Gastro-oesophageal reflux disease     esophagitis     Injury of knee, left      Insomnia      Osteoarthritis      Osteoporosis          PREVIOUS SURGERIES: (I personally reviewed this history with the patient at today's visit)   Past Surgical History:   Procedure Laterality Date     APPENDECTOMY       ARTHRODESIS ANKLE      left     ARTHROPLASTY KNEE Right 3/28/2019    Procedure: Right Total Knee Arthroplasty;  Surgeon: Tori Lloyd MD;  Location: [facility]     ARTHROPLASTY PATELLO-FEMORAL (KNEE)  2011    Procedure:ARTHROPLASTY PATELLO-FEMORAL (KNEE); Left Knee Rigby Arthrolplasty Prosthesis, Removal of hardware left knee; Surgeon:TORI LLOYD; Location:[facility]     ARTHROSCOPY KNEE      left     ARTHROSCOPY KNEE      right      SECTION      X 4     TONSILLECTOMY           ALLERGIES:     Allergies   Allergen Reactions     Latex Itching     burning     Latex      PN: Converted from LW Latex Sensitivity Flag       PERTINENT MEDICATIONS:    Current Outpatient Medications:      acetaminophen (TYLENOL) 325 MG tablet, Take 3 tablets (975 mg) by mouth every 8 hours,  Disp: 100 tablet, Rfl: 0     diphenhydrAMINE (BENADRYL) 25 MG tablet, Take 25 mg by mouth every 6 hours as needed., Disp: , Rfl:      esomeprazole (NEXIUM) 40 MG DR capsule, Take 40 mg by mouth every morning (before breakfast) Take 30-60 minutes before eating., Disp: , Rfl:      fluticasone (FLONASE) 50 MCG/ACT nasal spray, Spray 1 spray into both nostrils 2 times daily, Disp: , Rfl:      ibuprofen (ADVIL/MOTRIN) 100 MG tablet, Take 100 mg by mouth every 4 hours as needed, Disp: , Rfl:      Pseudoephedrine HCl (SUDAFED PO), Take  by mouth at onset of headache., Disp: , Rfl:      zolpidem ER (AMBIEN CR) 6.25 MG CR tablet, Take 6.25 mg by mouth nightly as needed for sleep, Disp: , Rfl:      dexlansoprazole (DEXILANT) 60 MG CPDR CR capsule, Take 1 capsule (60 mg) by mouth daily (Patient not taking: Reported on 6/11/2019), Disp: 90 capsule, Rfl: 3     HYDROmorphone (DILAUDID) 2 MG tablet, Take 1 tablet (2 mg) by mouth every 8 hours as needed for moderate to severe pain (Patient not taking: Reported on 6/11/2019), Disp: 10 tablet, Rfl: 0     hydrOXYzine (VISTARIL) 25 MG capsule, Take one capsule by mouth every 6 hours prn adjuvant pain and muscle spasm. (Patient not taking: Reported on 6/11/2019), Disp: 30 capsule, Rfl: 1     ondansetron (ZOFRAN-ODT) 4 MG ODT tab, Take 1 tablet (4 mg) by mouth every 4 hours as needed for nausea or vomiting (Patient not taking: Reported on 6/11/2019), Disp: 30 tablet, Rfl: 0    SOCIAL HISTORY:  Social History     Socioeconomic History     Marital status:      Spouse name: Not on file     Number of children: Not on file     Years of education: Not on file     Highest education level: Not on file   Occupational History     Not on file   Social Needs     Financial resource strain: Not on file     Food insecurity:     Worry: Not on file     Inability: Not on file     Transportation needs:     Medical: Not on file     Non-medical: Not on file   Tobacco Use     Smoking status: Never  Smoker     Smokeless tobacco: Never Used   Substance and Sexual Activity     Alcohol use: Yes     Comment: 4-6 drinks per week     Drug use: No     Sexual activity: Not Currently     Partners: Male   Lifestyle     Physical activity:     Days per week: Not on file     Minutes per session: Not on file     Stress: Not on file   Relationships     Social connections:     Talks on phone: Not on file     Gets together: Not on file     Attends Sikhism service: Not on file     Active member of club or organization: Not on file     Attends meetings of clubs or organizations: Not on file     Relationship status: Not on file     Intimate partner violence:     Fear of current or ex partner: Not on file     Emotionally abused: Not on file     Physically abused: Not on file     Forced sexual activity: Not on file   Other Topics Concern     Parent/sibling w/ CABG, MI or angioplasty before 65F 55M? Not Asked   Social History Narrative     Not on file       FAMILY HISTORY: (I personally reviewed this history with the patient at today's visit)  Family History   Problem Relation Age of Onset     Colon Cancer Mother      Diabetes No family hx of      Coronary Artery Disease No family hx of      Hypertension No family hx of      Hyperlipidemia No family hx of      Cerebrovascular Disease No family hx of      Breast Cancer No family hx of      Anxiety Disorder No family hx of      Osteoporosis No family hx of      Obesity No family hx of      Depression No family hx of      Prostate Cancer No family hx of      Other Cancer No family hx of      Mental Illness No family hx of      Substance Abuse No family hx of      Asthma No family hx of      Genetic Disorder No family hx of      Thyroid Disease No family hx of      Anesthesia Reaction No family hx of        ROS:    No fevers or chills  No weight loss  No blurry vision, double vision or change in vision  No sore throat  No lymphadenopathy  No headache, paraesthesias, or weakness in a  "limb  No shortness of breath or wheezing  No chest pain or pressure  No arthralgias or myalgias  No rashes or skin changes  No odynophagia or dysphagia  No BRBPR, hematochezia, melena  No dysuria, frequency or urgency  No hot/cold intolerance or polyria  No anxiety or depression  PHYSICAL EXAMINATION:  Constitutional: aaox3, cooperative, pleasant, not dyspneic/diaphoretic, no acute distress  Vitals reviewed: BP (!) 167/97   Pulse 73   Ht 1.449 m (4' 9.04\")   Wt 56.3 kg (124 lb 3.2 oz)   SpO2 98%   BMI 26.84 kg/m    Wt:   Wt Readings from Last 2 Encounters:   06/11/19 56.3 kg (124 lb 3.2 oz)   05/09/19 55.3 kg (122 lb)      Eyes: Sclera anicteric/injected  Ears/nose/mouth/throat: Normal oropharynx without ulcers or exudate, mucus membranes moist, hearing intact  Neck: supple, thyroid normal size  CV: No edema, RRR  Respiratory: Unlabored breathing, CTAB  Lymph: No submandibular, supraclavicular or inguinal lymphadenopathy  Abd: Nondistended, no masses, +bs, no hepatosplenomegaly, nontender, no peritoneal signs  Skin: warm, perfused, no jaundice  Psych: Normal affect  MSK: Normal gait      PERTINENT STUDIES: (I personally reviewed these laboratory studies today)  Most recent CBC:   Recent Labs   Lab Test 03/30/19  0631 03/29/19  0608 03/11/19  1043   WBC  --   --  4.5   HGB 9.7* 9.4* 14.1   HCT  --   --  44.4   PLT  --   --  245     Most recent hepatic panel:  No lab results found.    Invalid input(s): MAGDALENO, ALP  Most recent creatinine:  Recent Labs   Lab Test 03/11/19  1043   CR 0.69     ASSESSMENT/PLAN:    Nisha Perez is a 63 year old female who presents for evaluation of GERD refractory to PPI therapy.  She is failed several PPIs including omeprazole twice daily, pantoprazole, lansoprazole, AcipHex but has gotten some relief of symptoms with Nexium as well as Dexilant.  At the present time, I recommend continuing Nexium at 40 mg daily and she can continue to use Gaviscon as needed.  I recommend an EGD for " further evaluation of reflux refractory to PPI and this should be scheduled with MAC given prior history of incomplete response to moderate sedation.  If symptoms continue, further consideration could be given to pH testing.  If EGD is unremarkable, we could consider that this may have a component of esophageal hypersensitivity and consider a trial of neuromodulation with either nortriptyline or Effexor.  We will also evaluate her small intestine at the time of her upper endoscopy given a family history of a first-degree relative with celiac disease.  She is noted to be up-to-date with colonoscopy at this time.      Gastroesophageal reflux disease, esophagitis presence not specified    RTC 6 months    Thank you for this consultation.  It was a pleasure to participate in the care of this patient; please contact us with any further questions.     This note was created with voice recognition software, and while reviewed for accuracy, typos may remain.     Juan M Cox MD  Adjunct  of Medicine  Division of Gastroenterology, Hepatology and Nutrition  [facility]  [phone]

## 2019-06-11 NOTE — NURSING NOTE
"Nisha Perez's goals for this visit include:   Chief Complaint   Patient presents with     Consult     GERD       She requests these members of her care team be copied on today's visit information: yes    PCP: Emerson Spear    Referring Provider:  Emerson Spear PA-C  [address] [address]  [place] [address]    BP (!) 167/97   Pulse 73   Ht 1.449 m (4' 9.04\")   Wt 56.3 kg (124 lb 3.2 oz)   SpO2 98%   BMI 26.84 kg/m      Do you need any medication refills at today's visit? No    Fran Cottrell CMA      "

## 2019-06-12 NOTE — PROGRESS NOTES
[address]  [facility] [address]  [phone]  Dept: [phone]    PRE-OP EVALUATION:  Today's date: 2019    Nisha Perez (: 1955) presents for pre-operative evaluation assessment as requested by Dr. Chavez.  She requires evaluation and anesthesia risk assessment prior to undergoing surgery/procedure for treatment of Esophagogastroduodenoscopy, with CO2 insufflation .    Fax number for surgical facility: Available electronically  Primary Physician: Emerson Spear  Type of Anesthesia Anticipated: MAC    Patient has a Health Care Directive or Living Will:  YES    Preop Questions 2019   Who is doing your surgery? Dr. Cox   What are you having done? Endoscopy   Date of Surgery/Procedure: 19   Facility or Hospital where procedure/surgery will be performed: -   1.  Do you have a history of Heart attack, stroke, stent, coronary bypass surgery, or other heart surgery? No   2.  Do you ever have any pain or discomfort in your chest? No   3.  Do you have a history of  Heart Failure? No   4.   Are you troubled by shortness of breath when:  walking on a level surface, or up a slight hill, or at night? No   5.  Do you currently have a cold, bronchitis or other respiratory infection? No   6.  Do you have a cough, shortness of breath, or wheezing? No   7.  Do you sometimes get pains in the calves of your legs when you walk? No   8. Do you or anyone in your family have previous history of blood clots? No   9.  Do you or does anyone in your family have a serious bleeding problem such as prolonged bleeding following surgeries or cuts? No   10. Have you ever had problems with anemia or been told to take iron pills? No   11. Have you had any abnormal blood loss such as black, tarry or bloody stools, or abnormal vaginal bleeding? No   12. Have you ever had a blood transfusion? No   13. Have you or any of your relatives ever had problems with anesthesia?  No   14. Do you have sleep apnea, excessive snoring or daytime drowsiness? No   15. Do you have any prosthetic heart valves? No   16. Do you have prosthetic joints? YES - Right total knee replacement.    17. Is there any chance that you may be pregnant? No         HPI:     HPI related to upcoming procedure: Persistent GERD, unresponsive to PPIs.       See problem list for active medical problems.  Problems all longstanding and stable, except as noted/documented.  See ROS for pertinent symptoms related to these conditions.      MEDICAL HISTORY:     Patient Active Problem List    Diagnosis Date Noted     Aftercare following right knee joint replacement surgery 04/27/2019     Priority: Medium     Localized edema 04/27/2019     Priority: Medium     Status post knee surgery 03/28/2019     Priority: Medium     Phobia, flying 03/28/2017     Priority: Medium     Gastroesophageal reflux disease without esophagitis 03/28/2017     Priority: Medium     Advanced directives, counseling/discussion 11/13/2015     Priority: Medium     Patient has a health directive--copy to be obtained as able.  Roderick Hunter MD         Anxiety disorder      Priority: Medium     Subluxation of patella, acquired 08/04/2011     Priority: Medium     Aftercare following joint replacement 08/04/2011     Priority: Medium     Knee joint replacement by other means 08/04/2011     Priority: Medium     Abnormal gait 08/04/2011     Priority: Medium     iamJOINT DERANGEMENT NEC-L/LEG 12/05/2006     Priority: Medium      Past Medical History:   Diagnosis Date     Anxiety disorder      Gastro-oesophageal reflux disease     esophagitis     Injury of knee, left      Insomnia      Osteoarthritis      Osteoporosis      Past Surgical History:   Procedure Laterality Date     APPENDECTOMY       ARTHRODESIS ANKLE      left     ARTHROPLASTY KNEE Right 3/28/2019    Procedure: Right Total Knee Arthroplasty;  Surgeon: Monie Dai MD;  Location:  OR      ARTHROPLASTY PATELLO-FEMORAL (KNEE)  2011    Procedure:ARTHROPLASTY PATELLO-FEMORAL (KNEE); Left Knee Latia Arthrolplasty Prosthesis, Removal of hardware left knee; Surgeon:TORI LLOYD; [facility]     ARTHROSCOPY KNEE      left     ARTHROSCOPY KNEE      right      SECTION      X 4     TONSILLECTOMY       Current Outpatient Medications   Medication Sig Dispense Refill     acetaminophen (TYLENOL) 325 MG tablet Take 3 tablets (975 mg) by mouth every 8 hours 100 tablet 0     diphenhydrAMINE (BENADRYL) 25 MG tablet Take 25 mg by mouth every 6 hours as needed.       esomeprazole (NEXIUM) 40 MG DR capsule Take 40 mg by mouth every morning (before breakfast) Take 30-60 minutes before eating.       fluticasone (FLONASE) 50 MCG/ACT nasal spray Spray 1 spray into both nostrils 2 times daily       ibuprofen (ADVIL/MOTRIN) 100 MG tablet Take 100 mg by mouth every 4 hours as needed       Pseudoephedrine HCl (SUDAFED PO) Take  by mouth at onset of headache.       zolpidem ER (AMBIEN CR) 6.25 MG CR tablet Take 6.25 mg by mouth nightly as needed for sleep       dexlansoprazole (DEXILANT) 60 MG CPDR CR capsule Take 1 capsule (60 mg) by mouth daily (Patient not taking: Reported on 2019) 90 capsule 3     OTC products: None, except as noted above    Allergies   Allergen Reactions     Latex Itching     burning     Latex      PN: Converted from LW Latex Sensitivity Flag      Latex Allergy: YES: Precautions to take: NO LATEX    Social History     Tobacco Use     Smoking status: Never Smoker     Smokeless tobacco: Never Used   Substance Use Topics     Alcohol use: Yes     Comment: 4-6 drinks per week     History   Drug Use No       REVIEW OF SYSTEMS:   Constitutional, neuro, ENT, endocrine, pulmonary, cardiac, gastrointestinal, genitourinary, musculoskeletal, integument and psychiatric systems are negative, except as otherwise noted.    EXAM:   /88   Pulse 94   Temp 98.5  F (36.9  C) (Temporal)   Resp  14   Wt 55.3 kg (122 lb)   SpO2 100%   BMI 26.36 kg/m      GENERAL APPEARANCE: healthy, alert and no distress     EYES: EOMI, PERRL     HENT: ear canals and TM's normal and nose and mouth without ulcers or lesions     NECK: no adenopathy, no asymmetry, masses, or scars and thyroid normal to palpation     RESP: lungs clear to auscultation - no rales, rhonchi or wheezes     CV: regular rates and rhythm, normal S1 S2, no S3 or S4 and no murmur, click or rub     ABDOMEN:  soft, nontender, no HSM or masses and bowel sounds normal     MS: extremities normal- no gross deformities noted, no evidence of inflammation in joints, FROM in all extremities.     SKIN: no suspicious lesions or rashes     NEURO: Normal strength and tone, sensory exam grossly normal, mentation intact and speech normal     PSYCH: mentation appears normal. and affect normal/bright     LYMPHATICS: No cervical adenopathy    DIAGNOSTICS:   No labs or EKG required for low risk surgery (cataract, skin procedure, breast biopsy, etc)    Recent Labs   Lab Test 03/30/19  0631 03/29/19  0608 03/11/19  1043 07/22/11  0547   HGB 9.7* 9.4* 14.1 12.9   PLT  --   --  245  --    NA  --   --  139  --    POTASSIUM  --   --  4.0 4.4   CR  --   --  0.69  --         IMPRESSION:   Reason for surgery/procedure: persistent GERD, unresponsive to PPI    The proposed surgical procedure is considered LOW risk.    REVISED CARDIAC RISK INDEX  The patient has the following serious cardiovascular risks for perioperative complications such as (MI, PE, VFib and 3  AV Block):  No serious cardiac risks  INTERPRETATION: 0 risks: Class I (very low risk - 0.4% complication rate)    The patient has the following additional risks for perioperative complications:  No identified additional risks      ICD-10-CM    1. Preop general physical exam Z01.818    2. Gastroesophageal reflux disease without esophagitis K21.9    3. Latex allergy Z91.040    4. Elevated blood pressure reading without  diagnosis of hypertension R03.0        RECOMMENDATIONS:       Cardiovascular Risk  Performs 4 METs exercise without symptoms.       --Patient is to hold all medications on the day of surgery and resume after procedure.   --BP slightly elevated, patient will discontinue OTC sudafed, benadryl, and NSAIDs for now. Recommend repeat BP after procedure in clinic in 1 week to be sure it is back to her normal.   --PATIENT HAS LATEX ALLERGY  --PLEASE NOTE --  PATIENT WOKE UP DURING HER LAST ENDOSCOPY.    APPROVAL GIVEN to proceed with proposed procedure, without further diagnostic evaluation       Signed Electronically by: Yo Villegas PA-C    Copy of this evaluation report is provided to requesting physician.    [facility] Preop Guidelines    Revised Cardiac Risk Index

## 2019-06-13 ENCOUNTER — ANESTHESIA EVENT (OUTPATIENT)
Dept: SURGERY | Facility: AMBULATORY SURGERY CENTER | Age: 64
End: 2019-06-13

## 2019-06-13 RX ORDER — ONDANSETRON 2 MG/ML
4 INJECTION INTRAMUSCULAR; INTRAVENOUS EVERY 30 MIN PRN
Status: CANCELLED | OUTPATIENT
Start: 2019-06-13

## 2019-06-13 RX ORDER — OXYCODONE HYDROCHLORIDE 5 MG/1
5 TABLET ORAL EVERY 4 HOURS PRN
Status: CANCELLED | OUTPATIENT
Start: 2019-06-13

## 2019-06-13 RX ORDER — SODIUM CHLORIDE, SODIUM LACTATE, POTASSIUM CHLORIDE, CALCIUM CHLORIDE 600; 310; 30; 20 MG/100ML; MG/100ML; MG/100ML; MG/100ML
INJECTION, SOLUTION INTRAVENOUS CONTINUOUS
Status: CANCELLED | OUTPATIENT
Start: 2019-06-13

## 2019-06-13 RX ORDER — FENTANYL CITRATE 50 UG/ML
25-50 INJECTION, SOLUTION INTRAMUSCULAR; INTRAVENOUS
Status: CANCELLED | OUTPATIENT
Start: 2019-06-13

## 2019-06-13 RX ORDER — HYDROMORPHONE HYDROCHLORIDE 1 MG/ML
.3-.5 INJECTION, SOLUTION INTRAMUSCULAR; INTRAVENOUS; SUBCUTANEOUS EVERY 10 MIN PRN
Status: CANCELLED | OUTPATIENT
Start: 2019-06-13

## 2019-06-13 RX ORDER — MEPERIDINE HYDROCHLORIDE 25 MG/ML
12.5 INJECTION INTRAMUSCULAR; INTRAVENOUS; SUBCUTANEOUS
Status: CANCELLED | OUTPATIENT
Start: 2019-06-13

## 2019-06-13 RX ORDER — NALOXONE HYDROCHLORIDE 0.4 MG/ML
.1-.4 INJECTION, SOLUTION INTRAMUSCULAR; INTRAVENOUS; SUBCUTANEOUS
Status: CANCELLED | OUTPATIENT
Start: 2019-06-13 | End: 2019-06-14

## 2019-06-13 RX ORDER — ONDANSETRON 4 MG/1
4 TABLET, ORALLY DISINTEGRATING ORAL EVERY 30 MIN PRN
Status: CANCELLED | OUTPATIENT
Start: 2019-06-13

## 2019-06-14 ENCOUNTER — OFFICE VISIT (OUTPATIENT)
Dept: FAMILY MEDICINE | Facility: OTHER | Age: 64
End: 2019-06-14
Payer: COMMERCIAL

## 2019-06-14 VITALS
TEMPERATURE: 98.5 F | WEIGHT: 122 LBS | SYSTOLIC BLOOD PRESSURE: 140 MMHG | DIASTOLIC BLOOD PRESSURE: 88 MMHG | BODY MASS INDEX: 26.36 KG/M2 | OXYGEN SATURATION: 100 % | RESPIRATION RATE: 14 BRPM | HEART RATE: 94 BPM

## 2019-06-14 DIAGNOSIS — K21.9 GASTROESOPHAGEAL REFLUX DISEASE WITHOUT ESOPHAGITIS: ICD-10-CM

## 2019-06-14 DIAGNOSIS — Z91.040 LATEX ALLERGY: ICD-10-CM

## 2019-06-14 DIAGNOSIS — R03.0 ELEVATED BLOOD PRESSURE READING WITHOUT DIAGNOSIS OF HYPERTENSION: ICD-10-CM

## 2019-06-14 DIAGNOSIS — Z01.818 PREOP GENERAL PHYSICAL EXAM: Primary | ICD-10-CM

## 2019-06-14 PROCEDURE — 99214 OFFICE O/P EST MOD 30 MIN: CPT | Performed by: PHYSICIAN ASSISTANT

## 2019-06-17 ENCOUNTER — SURGERY (OUTPATIENT)
Age: 64
End: 2019-06-17
Payer: COMMERCIAL

## 2019-06-17 ENCOUNTER — ANESTHESIA (OUTPATIENT)
Dept: SURGERY | Facility: AMBULATORY SURGERY CENTER | Age: 64
End: 2019-06-17
Payer: COMMERCIAL

## 2019-06-17 ENCOUNTER — HOSPITAL ENCOUNTER (OUTPATIENT)
Facility: AMBULATORY SURGERY CENTER | Age: 64
Discharge: HOME | End: 2019-06-17
Attending: INTERNAL MEDICINE | Admitting: INTERNAL MEDICINE
Payer: COMMERCIAL

## 2019-06-17 VITALS
SYSTOLIC BLOOD PRESSURE: 128 MMHG | OXYGEN SATURATION: 96 % | TEMPERATURE: 98.3 F | DIASTOLIC BLOOD PRESSURE: 80 MMHG | RESPIRATION RATE: 16 BRPM

## 2019-06-17 VITALS — HEART RATE: 84 BPM

## 2019-06-17 LAB — UPPER GI ENDOSCOPY: NORMAL

## 2019-06-17 PROCEDURE — 43239 EGD BIOPSY SINGLE/MULTIPLE: CPT | Mod: 59 | Performed by: INTERNAL MEDICINE

## 2019-06-17 PROCEDURE — 43251 EGD REMOVE LESION SNARE: CPT

## 2019-06-17 PROCEDURE — G8918 PT W/O PREOP ORDER IV AB PRO: HCPCS

## 2019-06-17 PROCEDURE — 88305 TISSUE EXAM BY PATHOLOGIST: CPT | Performed by: INTERNAL MEDICINE

## 2019-06-17 PROCEDURE — 43251 EGD REMOVE LESION SNARE: CPT | Performed by: INTERNAL MEDICINE

## 2019-06-17 PROCEDURE — G8907 PT DOC NO EVENTS ON DISCHARG: HCPCS

## 2019-06-17 PROCEDURE — 43239 EGD BIOPSY SINGLE/MULTIPLE: CPT | Mod: XS

## 2019-06-17 PROCEDURE — 88342 IMHCHEM/IMCYTCHM 1ST ANTB: CPT | Performed by: INTERNAL MEDICINE

## 2019-06-17 RX ORDER — SODIUM CHLORIDE, SODIUM LACTATE, POTASSIUM CHLORIDE, CALCIUM CHLORIDE 600; 310; 30; 20 MG/100ML; MG/100ML; MG/100ML; MG/100ML
INJECTION, SOLUTION INTRAVENOUS CONTINUOUS
Status: DISCONTINUED | OUTPATIENT
Start: 2019-06-17 | End: 2019-06-18 | Stop reason: HOSPADM

## 2019-06-17 RX ORDER — LIDOCAINE 40 MG/G
CREAM TOPICAL
Status: DISCONTINUED | OUTPATIENT
Start: 2019-06-17 | End: 2019-06-18 | Stop reason: HOSPADM

## 2019-06-17 RX ORDER — PROPOFOL 10 MG/ML
INJECTION, EMULSION INTRAVENOUS PRN
Status: DISCONTINUED | OUTPATIENT
Start: 2019-06-17 | End: 2019-06-17

## 2019-06-17 RX ORDER — ONDANSETRON 2 MG/ML
4 INJECTION INTRAMUSCULAR; INTRAVENOUS
Status: DISCONTINUED | OUTPATIENT
Start: 2019-06-17 | End: 2019-06-18 | Stop reason: HOSPADM

## 2019-06-17 RX ORDER — LIDOCAINE HYDROCHLORIDE 20 MG/ML
INJECTION, SOLUTION INFILTRATION; PERINEURAL PRN
Status: DISCONTINUED | OUTPATIENT
Start: 2019-06-17 | End: 2019-06-17

## 2019-06-17 RX ADMIN — PROPOFOL 50 MG: 10 INJECTION, EMULSION INTRAVENOUS at 13:06

## 2019-06-17 RX ADMIN — PROPOFOL 20 MG: 10 INJECTION, EMULSION INTRAVENOUS at 13:13

## 2019-06-17 RX ADMIN — PROPOFOL 20 MG: 10 INJECTION, EMULSION INTRAVENOUS at 13:18

## 2019-06-17 RX ADMIN — PROPOFOL 20 MG: 10 INJECTION, EMULSION INTRAVENOUS at 13:16

## 2019-06-17 RX ADMIN — PROPOFOL 100 MG: 10 INJECTION, EMULSION INTRAVENOUS at 13:04

## 2019-06-17 RX ADMIN — PROPOFOL 20 MG: 10 INJECTION, EMULSION INTRAVENOUS at 13:09

## 2019-06-17 RX ADMIN — PROPOFOL 20 MG: 10 INJECTION, EMULSION INTRAVENOUS at 13:25

## 2019-06-17 RX ADMIN — LIDOCAINE HYDROCHLORIDE 60 MG: 20 INJECTION, SOLUTION INFILTRATION; PERINEURAL at 13:04

## 2019-06-17 RX ADMIN — PROPOFOL 20 MG: 10 INJECTION, EMULSION INTRAVENOUS at 13:11

## 2019-06-17 NOTE — ANESTHESIA CARE TRANSFER NOTE
Patient: iNsha Perez    Procedure(s):  ESOPHAGOGASTRODUODENOSCOPY, WITH CO2 INSUFFLATION  Esophagogastroduodenoscopy, With Biopsy  Esophagogastroduodenoscopy, With Lesion Removal Using Snare    Diagnosis: REFLUX  Diagnosis Additional Information: No value filed.    Anesthesia Type:   No value filed.     Note:  Airway :Room Air  Patient transferred to:Phase II  Comments: Awake, comfortable, sats 99%, report to RN.Handoff Report: Identifed the Patient, Identified the Reponsible Provider, Reviewed the pertinent medical history, Discussed the surgical course, Reviewed Intra-OP anesthesia mangement and issues during anesthesia, Set expectations for post-procedure period and Allowed opportunity for questions and acknowledgement of understanding      Vitals: (Last set prior to Anesthesia Care Transfer)    CRNA VITALS  6/17/2019 1259 - 6/17/2019 1332      6/17/2019             Pulse:  81    SpO2:  96 %                Electronically Signed By: MAKENNA Hughes CRNA  June 17, 2019  1:32 PM

## 2019-06-17 NOTE — ANESTHESIA POSTPROCEDURE EVALUATION
Anesthesia POST Procedure Evaluation    Patient: Nisha Perez   MRN:     [id number] Gender:   female   Age:    63 year old :      1955        Preoperative Diagnosis: REFLUX   Procedure(s):  ESOPHAGOGASTRODUODENOSCOPY, WITH CO2 INSUFFLATION  Esophagogastroduodenoscopy, With Biopsy  Esophagogastroduodenoscopy, With Lesion Removal Using Snare   Postop Comments: No value filed.       Anesthesia Type:  MAC  MAC    Reportable Event: NO     PAIN: Uncomplicated   Sign Out status: Comfortable, Well controlled pain     PONV: No PONV   Sign Out status:  No Nausea or Vomiting     Neuro/Psych: Uneventful perioperative course   Sign Out Status: Preoperative baseline; Age appropriate mentation     Airway/Resp.: Uneventful perioperative course   Sign Out Status: Non labored breathing, age appropriate RR; Resp. Status within EXPECTED Parameters     CV: Uneventful perioperative course   Sign Out status: Appropriate BP and perfusion indices; Appropriate HR/Rhythm     Disposition:   Sign Out in:  Phase II  Recovery Course: Uneventful  Follow-Up: Not required           Last Anesthesia Record Vitals:  CRNA VITALS  2019 1259 - 2019 1359      2019             Pulse:  81    SpO2:  96 %          Last PACU Vitals:  Vitals Value Taken Time   /77 2019  1:29 PM   Temp     Pulse 84 2019  1:25 PM   Resp 16 2019  1:29 PM   SpO2 96 % 2019  1:29 PM   Temp src     NIBP 124/84 2019  1:25 PM   Pulse 81 2019  1:27 PM   SpO2 96 % 2019  1:27 PM   Resp     Temp 96.8  F (36  C) 2019  1:21 PM   Ht Rate 82 2019  1:25 PM   Temp 2           Electronically Signed By: Chuy Nagel MD, 2019, 2:10 PM

## 2019-06-17 NOTE — ANESTHESIA PREPROCEDURE EVALUATION
Anesthesia Pre-Procedure Evaluation    Patient: Nisha Perez   MRN:     [id number] Gender:   female   Age:    63 year old :      1955        Preoperative Diagnosis: REFLUX   Procedure(s):  ESOPHAGOGASTRODUODENOSCOPY, WITH CO2 INSUFFLATION     Past Medical History:   Diagnosis Date     Anxiety disorder      Gastro-oesophageal reflux disease     esophagitis     Injury of knee, left      Insomnia      Osteoarthritis      Osteoporosis       Past Surgical History:   Procedure Laterality Date     APPENDECTOMY       ARTHRODESIS ANKLE      left     ARTHROPLASTY KNEE Right 3/28/2019    Procedure: Right Total Knee Arthroplasty;  Surgeon: Monie Lloyd MD;  Location: [facility]     ARTHROPLASTY PATELLO-FEMORAL (KNEE)  2011    Procedure:ARTHROPLASTY PATELLO-FEMORAL (KNEE); Left Knee Latia Arthrolplasty Prosthesis, Removal of hardware left knee; Surgeon:MONIE LLOYD; [facility]     ARTHROSCOPY KNEE      left     ARTHROSCOPY KNEE      right      SECTION      X 4     TONSILLECTOMY            Anesthesia Evaluation     . Pt has had prior anesthetic. Type: General, MAC and Regional           ROS/MED HX    ENT/Pulmonary:  - neg pulmonary ROS     Neurologic:  - neg neurologic ROS     Cardiovascular:  - neg cardiovascular ROS       METS/Exercise Tolerance:     Hematologic:  - neg hematologic  ROS       Musculoskeletal:  - neg musculoskeletal ROS       GI/Hepatic:     (+) GERD Asymptomatic on medication,       Renal/Genitourinary:         Endo:  - neg endo ROS       Psychiatric:     (+) psychiatric history anxiety      Infectious Disease:  - neg infectious disease ROS       Malignancy:      - no malignancy   Other:    - neg other ROS                     PHYSICAL EXAM:   Mental Status/Neuro: A/A/O   Airway: Facies: Feasible  Mallampati: I  Mouth/Opening: Full  TM distance: > 6 cm  Neck ROM: Full   Respiratory: Auscultation: CTAB     Resp. Rate: Normal     Resp. Effort: Normal      CV: Rhythm:  "Regular  Rate: Age appropriate  Heart: Normal Sounds   Comments:      Dental: Normal                  Lab Results   Component Value Date    WBC 4.5 03/11/2019    HGB 9.7 (L) 03/30/2019    HCT 44.4 03/11/2019     03/11/2019     03/11/2019    POTASSIUM 4.0 03/11/2019    CHLORIDE 106 03/11/2019    CO2 27 03/11/2019    BUN 11 03/11/2019    CR 0.69 03/11/2019     (H) 03/28/2019    LEEANN 9.0 03/11/2019    TSH 1.43 06/19/2018       Preop Vitals  BP Readings from Last 3 Encounters:   06/14/19 140/88   06/11/19 (!) 167/97   05/09/19 124/72    Pulse Readings from Last 3 Encounters:   06/14/19 94   06/11/19 73   05/09/19 82      Resp Readings from Last 3 Encounters:   06/14/19 14   05/09/19 16   03/30/19 16    SpO2 Readings from Last 3 Encounters:   06/14/19 100%   06/11/19 98%   05/09/19 98%      Temp Readings from Last 1 Encounters:   06/14/19 98.5  F (36.9  C) (Temporal)    Ht Readings from Last 1 Encounters:   06/11/19 1.449 m (4' 9.04\")      Wt Readings from Last 1 Encounters:   06/14/19 55.3 kg (122 lb)    Estimated body mass index is 26.36 kg/m  as calculated from the following:    Height as of 6/11/19: 1.449 m (4' 9.04\").    Weight as of 6/14/19: 55.3 kg (122 lb).     LDA:  Peripheral IV 07/21/11 Left Hand (Active)   Number of days: 2888       Closed/Suction Drain Left Knee Accordion 10 [place]ian (Active)   Number of days: 2888            Assessment:   ASA SCORE: 2    Will be NPO appropriate at: 6/17/2019 12:16 PM   Documentation: H&P complete; Preop Testing complete; Consents complete   Proceeding: Proceed without further delay  Tobacco Use:  NO Active use of Tobacco/UNKNOWN Tobacco use status     Plan:   Anes. Type:  MAC   Pre-Induction: Midazolam IV   Induction:  IV (Standard)   Airway: Native Airway   Access/Monitoring: PIV   Maintenance: Propofol; IV   Emergence: Procedure Site   Logistics: Same Day Surgery     Postop Pain/Sedation Strategy:  Standard-Options: Opioids PRN     PONV Management:  Adult " Risk Factors: Female, Non-Smoker, Postop Opioids  Prevention: Propofol Infusion; Ondansetron     CONSENT: Direct conversation   Plan and risks discussed with: Patient   Blood Products: Consent Deferred (Minimal Blood Loss)                         Chuy Nagel MD

## 2019-06-25 LAB — COPATH REPORT: NORMAL

## 2019-07-11 ENCOUNTER — ANCILLARY PROCEDURE (OUTPATIENT)
Dept: MAMMOGRAPHY | Facility: OTHER | Age: 64
End: 2019-07-11
Attending: PHYSICIAN ASSISTANT
Payer: COMMERCIAL

## 2019-07-11 ENCOUNTER — TELEPHONE (OUTPATIENT)
Dept: ORTHOPEDICS | Facility: CLINIC | Age: 64
End: 2019-07-11

## 2019-07-11 DIAGNOSIS — Z12.31 VISIT FOR SCREENING MAMMOGRAM: ICD-10-CM

## 2019-07-11 PROCEDURE — 77067 SCR MAMMO BI INCL CAD: CPT | Mod: TC

## 2019-07-11 NOTE — TELEPHONE ENCOUNTER
[facility] Call Center    Phone Message    May a detailed message be left on voicemail: yes    Reason for Call: Other: Pt said she had knee replacement end of March and she is not healing correclty, she would like to discuss this further, please call pt     Action Taken: Message routed to:  [facility] Ortho

## 2019-07-11 NOTE — TELEPHONE ENCOUNTER
Patient was called back and a message was left.  Dr. Dai had a cancellation and she can be seen at 11:15am on this Tuesday 7/16/2019. A message was left to call back and confirm.    Her appointment can be rescheduled from 8/13/2019 to 7/16/2019 at 11:15am.  The spot is on hold for her.

## 2019-07-11 NOTE — TELEPHONE ENCOUNTER
Patient was called back. She stated that she is still in pain and her knee is stiff.  She can function and go up and down stairs but it is hard to do.  She has not been to a formal PT in a while but she feels that her ROM is 115.  She was offered and accepted an appointment on 8/13/2019 at 3:00pm.  She would like this to be discussed with Dr. Dai to see if she needs to be seen again before she has a manipulation.  This will be discussed next week and we will call if there is a different plan or if an opening is available sooner.  She was agreeable with this plan.

## 2019-07-16 ENCOUNTER — OFFICE VISIT (OUTPATIENT)
Dept: ORTHOPEDICS | Facility: CLINIC | Age: 64
End: 2019-07-16
Payer: COMMERCIAL

## 2019-07-16 DIAGNOSIS — Z98.890 S/P KNEE SURGERY: Primary | ICD-10-CM

## 2019-07-16 ASSESSMENT — ENCOUNTER SYMPTOMS
MUSCLE WEAKNESS: 0
NIGHT SWEATS: 0
ARTHRALGIAS: 1
CHILLS: 0
DECREASED APPETITE: 0
POLYDIPSIA: 0
MUSCLE CRAMPS: 0
WEIGHT GAIN: 0
ALTERED TEMPERATURE REGULATION: 0
JOINT SWELLING: 1
BACK PAIN: 0
INCREASED ENERGY: 0
FEVER: 0
FATIGUE: 0
HALLUCINATIONS: 0
STIFFNESS: 1
MYALGIAS: 0
NECK PAIN: 0
WEIGHT LOSS: 0

## 2019-07-16 NOTE — PROGRESS NOTES
[address]  [facility] [address]  [phone]  Dept: [phone]    PRE-OP EVALUATION:  Today's date: 2019    Nisha Perez (: 1955) presents for pre-operative evaluation assessment as requested by Dr. Dai.  She requires evaluation and anesthesia risk assessment prior to undergoing surgery/procedure for treatment of Right Knee Mini Open Lysis of Adhesions .    Fax number for surgical facility: Available Electronically; [phone]  Primary Physician: Emerson Spear  Type of Anesthesia Anticipated: Combined General with Block    Patient has a Health Care Directive or Living Will:  YES     Preop Questions 2019   Who is doing your surgery? Dr. Dai   What are you having done? Right knee manipulation with lysis of adhesions   Date of Surgery/Procedure: 2719   Facility or Hospital where procedure/surgery will be performed: -   1.  Do you have a history of Heart attack, stroke, stent, coronary bypass surgery, or other heart surgery? No   2.  Do you ever have any pain or discomfort in your chest? No   3.  Do you have a history of  Heart Failure? No   4.   Are you troubled by shortness of breath when:  walking on a level surface, or up a slight hill, or at night? No   5.  Do you currently have a cold, bronchitis or other respiratory infection? No   6.  Do you have a cough, shortness of breath, or wheezing? No   7.  Do you sometimes get pains in the calves of your legs when you walk? No   8. Do you or anyone in your family have previous history of blood clots? No   9.  Do you or does anyone in your family have a serious bleeding problem such as prolonged bleeding following surgeries or cuts? No   10. Have you ever had problems with anemia or been told to take iron pills? No   11. Have you had any abnormal blood loss such as black, tarry or bloody stools, or abnormal vaginal bleeding? No   12. Have you ever had a blood transfusion? No   13. Have  you or any of your relatives ever had problems with anesthesia? No   14. Do you have sleep apnea, excessive snoring or daytime drowsiness? No   15. Do you have any prosthetic heart valves? No   16. Do you have prosthetic joints? YES - Knees   17. Is there any chance that you may be pregnant? No         HPI:     HPI related to upcoming procedure: Persistent right knee pain and stiffness after TKA      See problem list for active medical problems.  Problems all longstanding and stable, except as noted/documented.  See ROS for pertinent symptoms related to these conditions.      MEDICAL HISTORY:     Patient Active Problem List    Diagnosis Date Noted     Aftercare following right knee joint replacement surgery 04/27/2019     Priority: Medium     Localized edema 04/27/2019     Priority: Medium     Status post knee surgery 03/28/2019     Priority: Medium     Phobia, flying 03/28/2017     Priority: Medium     Gastroesophageal reflux disease without esophagitis 03/28/2017     Priority: Medium     Advanced directives, counseling/discussion 11/13/2015     Priority: Medium     Patient has a health directive--copy to be obtained as able.  Roderick Hunter MD         Anxiety disorder      Priority: Medium     Subluxation of patella, acquired 08/04/2011     Priority: Medium     Aftercare following joint replacement 08/04/2011     Priority: Medium     Knee joint replacement by other means 08/04/2011     Priority: Medium     Abnormal gait 08/04/2011     Priority: Medium     iamJOINT DERANGEMENT NEC-L/LEG 12/05/2006     Priority: Medium      Past Medical History:   Diagnosis Date     Anxiety disorder      Gastro-oesophageal reflux disease     esophagitis     Injury of knee, left      Insomnia      Osteoarthritis      Osteoporosis      Past Surgical History:   Procedure Laterality Date     APPENDECTOMY       ARTHRODESIS ANKLE      left, staples      ARTHROPLASTY KNEE Right 3/28/2019    Procedure: Right Total Knee Arthroplasty;   Surgeon: Monie Lloyd MD;  [facility]     ARTHROPLASTY PATELLO-FEMORAL (KNEE)  2011    Procedure:ARTHROPLASTY PATELLO-FEMORAL (KNEE); Left Knee [place] Arthrolplasty Prosthesis, Removal of hardware left knee; Surgeon:MONIE LLOYD; [facility]     ARTHROSCOPY KNEE      left     ARTHROSCOPY KNEE      right, maureen      SECTION      X 4     COMBINED ESOPHAGOSCOPY, GASTROSCOPY, DUODENOSCOPY (EGD) WITH CO2 INSUFFLATION N/A 2019    Procedure: ESOPHAGOGASTRODUODENOSCOPY, WITH CO2 INSUFFLATION;  Surgeon: Juan M Cox MD;  [facility]     ESOPHAGOSCOPY, GASTROSCOPY, DUODENOSCOPY (EGD), COMBINED N/A 2019    Procedure: Esophagogastroduodenoscopy, With Biopsy;  Surgeon: Juan M Cox MD;  Location: [facility]     ORTHOPEDIC SURGERY      left knee partial knee replacement     TONSILLECTOMY       Current Outpatient Medications   Medication Sig Dispense Refill     acetaminophen (TYLENOL) 325 MG tablet Take 3 tablets (975 mg) by mouth every 8 hours 100 tablet 0     esomeprazole (NEXIUM) 40 MG DR capsule Take 40 mg by mouth every morning (before breakfast) Take 30-60 minutes before eating.       fluticasone (FLONASE) 50 MCG/ACT nasal spray Spray 1 spray into both nostrils 2 times daily       ibuprofen (ADVIL/MOTRIN) 100 MG tablet Take 100 mg by mouth every 4 hours as needed       zolpidem ER (AMBIEN CR) 6.25 MG CR tablet Take 6.25 mg by mouth nightly as needed for sleep       Pseudoephedrine HCl (SUDAFED PO) Take  by mouth at onset of headache.       OTC products: no use of herbal medications or other supplements    Allergies   Allergen Reactions     Latex Itching     burning     Latex      PN: Converted from LW Latex Sensitivity Flag      Latex Allergy: YES: Precautions to take: NO LATEX    Social History     Tobacco Use     Smoking status: Never Smoker     Smokeless tobacco: Never Used   Substance Use Topics     Alcohol use: Yes     Comment: 4-6 drinks per  "week     History   Drug Use No       REVIEW OF SYSTEMS:   Constitutional, neuro, ENT, endocrine, pulmonary, cardiac, gastrointestinal, genitourinary, musculoskeletal, integument and psychiatric systems are negative, except as otherwise noted.    EXAM:   /86   Pulse 80   Temp 98.7  F (37.1  C) (Temporal)   Resp 16   Ht 1.455 m (4' 9.28\")   Wt 56.2 kg (123 lb 12.8 oz)   SpO2 97%   BMI 26.53 kg/m      GENERAL APPEARANCE: healthy, alert and no distress     EYES: EOMI, PERRL     HENT: ear canals and TM's normal and nose and mouth without ulcers or lesions     NECK: no adenopathy, no asymmetry, masses, or scars and thyroid normal to palpation     RESP: lungs clear to auscultation - no rales, rhonchi or wheezes     CV: regular rates and rhythm, normal S1 S2, no S3 or S4 and no murmur, click or rub     ABDOMEN:  soft, nontender, no HSM or masses and bowel sounds normal     MS: extremities normal- no gross deformities noted, no evidence of inflammation in joints, FROM in all extremities.     SKIN: no suspicious lesions or rashes     NEURO: Normal strength and tone, sensory exam grossly normal, mentation intact and speech normal     PSYCH: mentation appears normal. and affect normal/bright     LYMPHATICS: No cervical adenopathy    DIAGNOSTICS:   EKG: appears normal, NSR, no LVH by voltage criteria, unchanged from previous tracings, negative precordial T waves    Recent Labs   Lab Test 03/30/19  0631 03/29/19  0608 03/11/19  1043 07/22/11  0547   HGB 9.7* 9.4* 14.1 12.9   PLT  --   --  245  --    NA  --   --  139  --    POTASSIUM  --   --  4.0 4.4   CR  --   --  0.69  --        Component      Latest Ref Rng & Units 7/19/2019   Hemoglobin      11.7 - 15.7 g/dL 13.7       IMPRESSION:   Reason for surgery/procedure: Persistent right knee stiffness and pain s/p TKA.    The proposed surgical procedure is considered INTERMEDIATE risk.    REVISED CARDIAC RISK INDEX  The patient has the following serious cardiovascular " risks for perioperative complications such as (MI, PE, VFib and 3  AV Block):  No serious cardiac risks  INTERPRETATION: 0 risks: Class I (very low risk - 0.4% complication rate)    The patient has the following additional risks for perioperative complications:  No identified additional risks      ICD-10-CM    1. Preop general physical exam Z01.818 EKG 12-lead complete w/read - Clinics     Hemoglobin   2. Osteoarthritis of right knee, unspecified osteoarthritis type M17.11    3. Latex allergy Z91.040        RECOMMENDATIONS:       Cardiovascular Risk  Performs 4 METs exercise without symptoms.       --Patient is to hold all medications day of procedure.   --She already stopped NSAIDs.  --NOTE LATEX ALLERGY    APPROVAL GIVEN to proceed with proposed procedure, without further diagnostic evaluation       Signed Electronically by: Yo Villegas PA-C    Copy of this evaluation report is provided to requesting physician.    [facility] Preop Guidelines    Revised Cardiac Risk Index

## 2019-07-16 NOTE — NURSING NOTE
Teaching Flowsheet   Relevant Diagnosis: Right knee arthrofibrosis  Teaching Topic: Right knee maip     Person(s) involved in teaching:   Patient     Motivation Level:  Asks Questions: Yes  Eager to Learn: Yes  Cooperative: Yes  Receptive (willing/able to accept information): Yes  Any cultural factors/[place] beliefs that may influence understanding or compliance? No       Patient demonstrates understanding of the following:  Reason for the appointment, diagnosis and treatment plan: Yes  Knowledge of proper use of medications and conditions for which they are ordered (with special attention to potential side effects or drug interactions): Yes  Which situations necessitate calling provider and whom to contact: Yes       Teaching Concerns Addressed:   She will have her H&P completed by her PCP.  She will contact us if she cannot get in in time.  Her  Alan will bring her and take her home from surgery.  He will also be the one staying with her after surgery.  His contact information is in the chart.         Nutritional needs and diet plan: Yes  Pain management techniques: Yes  Wound Care: Yes  How and/when to access community resources: Yes     Instructional Materials Used/Given: verbal pre op packet given.  She has soap left from previous surgeries      Time spent with patient: 10 min.

## 2019-07-16 NOTE — LETTER
7/16/2019       RE: Nisha Perez  [address] [place] [id number]     Dear Colleague,    Thank you for referring your patient, Nisha Perez, to [facility]. Please see a copy of my visit note below.    Patient is a delightful 63-year-old female who is approximately 4 months status post right total knee replacement.  She is a bit frustrated with her motion.  She was 108 degrees at the end of May and she has increased slightly to 115 degrees on today's exam.  She does not believe that this is compatible with good function.    She had excellent motion preoperatively at 10-1 50.  Her left knee, which has a PFA in place, has range of motion 0-1 55    It is interesting to note that prior to the PFA on her right side I did a patella procedure in her youth, and she did need a manipulation on that side.    She is here wanting to talk about a possible manipulation    After reviewing her x-rays and her motion, I agree that ambulation is the next appropriate step.  We talked about the surgical procedure which she has had in the past.  We talked about the use of steroids which was not done when we did this previously.  She is desiring to have this be performed the near future and will find the next appropriate surgical date.    All questions were answered.  This is a postoperative visit    Monie Dai MD  Professor Orthopedic Surgery  [facility]

## 2019-07-16 NOTE — NURSING NOTE
Reason For Visit:   Chief Complaint   Patient presents with     RECHECK     DOS 3/28/19 Right TKA       Primary MD: Emerson Spear    ?  No  Currently working? No.  Work status?  Retired.  Date of surgery: 3/28/19  Type of surgery: Right TKA.  Smoker: No  Request smoking cessation information: No    There were no vitals taken for this visit.    Pain Assessment  Patient Currently in Pain: Yes  0-10 Pain Scale: 2(Stiffness mostly)  Primary Pain Location: Knee(Right)               Day Cota ATC

## 2019-07-16 NOTE — PROGRESS NOTES
Patient is a delightful 63-year-old female who is approximately 4 months status post right total knee replacement.  She is a bit frustrated with her motion.  She was 108 degrees at the end of May and she has increased slightly to 115 degrees on today's exam.  She does not believe that this is compatible with good function.    She had excellent motion preoperatively at 10-1 50.  Her left knee, which has a PFA in place, has range of motion 0-1 55    It is interesting to note that prior to the PFA on her right side I did a patella procedure in her youth, and she did need a manipulation on that side.    She is here wanting to talk about a possible manipulation    After reviewing her x-rays and her motion, I agree that ambulation is the next appropriate step.  We talked about the surgical procedure which she has had in the past.  We talked about the use of steroids which was not done when we did this previously.  She is desiring to have this be performed the near future and will find the next appropriate surgical date.    All questions were answered.  This is a postoperative visit    Monie Dai MD  Professor Orthopedic Surgery  [facility]

## 2019-07-19 ENCOUNTER — OFFICE VISIT (OUTPATIENT)
Dept: FAMILY MEDICINE | Facility: OTHER | Age: 64
End: 2019-07-19
Payer: COMMERCIAL

## 2019-07-19 VITALS
RESPIRATION RATE: 16 BRPM | HEIGHT: 57 IN | HEART RATE: 80 BPM | TEMPERATURE: 98.7 F | DIASTOLIC BLOOD PRESSURE: 86 MMHG | BODY MASS INDEX: 26.71 KG/M2 | OXYGEN SATURATION: 97 % | WEIGHT: 123.8 LBS | SYSTOLIC BLOOD PRESSURE: 130 MMHG

## 2019-07-19 DIAGNOSIS — Z91.040 LATEX ALLERGY: ICD-10-CM

## 2019-07-19 DIAGNOSIS — M17.11 OSTEOARTHRITIS OF RIGHT KNEE, UNSPECIFIED OSTEOARTHRITIS TYPE: ICD-10-CM

## 2019-07-19 DIAGNOSIS — Z01.818 PREOP GENERAL PHYSICAL EXAM: Primary | ICD-10-CM

## 2019-07-19 LAB — HGB BLD-MCNC: 13.7 G/DL (ref 11.7–15.7)

## 2019-07-19 PROCEDURE — 36415 COLL VENOUS BLD VENIPUNCTURE: CPT | Performed by: PHYSICIAN ASSISTANT

## 2019-07-19 PROCEDURE — 93000 ELECTROCARDIOGRAM COMPLETE: CPT | Performed by: PHYSICIAN ASSISTANT

## 2019-07-19 PROCEDURE — 85018 HEMOGLOBIN: CPT | Performed by: PHYSICIAN ASSISTANT

## 2019-07-19 PROCEDURE — 99214 OFFICE O/P EST MOD 30 MIN: CPT | Performed by: PHYSICIAN ASSISTANT

## 2019-07-19 ASSESSMENT — MIFFLIN-ST. JEOR: SCORE: 994.92

## 2019-07-25 ENCOUNTER — ANESTHESIA EVENT (OUTPATIENT)
Dept: SURGERY | Facility: AMBULATORY SURGERY CENTER | Age: 64
End: 2019-07-25

## 2019-07-25 ENCOUNTER — TELEPHONE (OUTPATIENT)
Dept: ORTHOPEDICS | Facility: CLINIC | Age: 64
End: 2019-07-25

## 2019-07-25 NOTE — ANESTHESIA PREPROCEDURE EVALUATION
Anesthesia Pre-Procedure Evaluation    Patient: Nisha Perez   MRN:     [id number] Gender:   female   Age:    63 year old :      1955        Preoperative Diagnosis: Knee Arthrofibrosis   Procedure(s):  Right Knee Mini Open Lysis of Adhesions     Past Medical History:   Diagnosis Date     Anxiety disorder      Gastro-oesophageal reflux disease     esophagitis     Injury of knee, left      Insomnia      Osteoarthritis      Osteoporosis       Past Surgical History:   Procedure Laterality Date     APPENDECTOMY       ARTHRODESIS ANKLE      left, staples      ARTHROPLASTY KNEE Right 3/28/2019    Procedure: Right Total Knee Arthroplasty;  Surgeon: Monie Lloyd MD;  [facility]     ARTHROPLASTY PATELLO-FEMORAL (KNEE)  2011    Procedure:ARTHROPLASTY PATELLO-FEMORAL (KNEE); Left Knee [place] Arthrolplasty Prosthesis, Removal of hardware left knee; Surgeon:MONIE LLOYD; [facility]     ARTHROSCOPY KNEE      left     ARTHROSCOPY KNEE      right, maureen      SECTION      X 4     COMBINED ESOPHAGOSCOPY, GASTROSCOPY, DUODENOSCOPY (EGD) WITH CO2 INSUFFLATION N/A 2019    Procedure: ESOPHAGOGASTRODUODENOSCOPY, WITH CO2 INSUFFLATION;  Surgeon: Juan M Cox MD;  Location:  OR     ESOPHAGOSCOPY, GASTROSCOPY, DUODENOSCOPY (EGD), COMBINED N/A 2019    Procedure: Esophagogastroduodenoscopy, With Biopsy;  Surgeon: Juan M Cox MD;  Location:  OR     ORTHOPEDIC SURGERY      left knee partial knee replacement     TONSILLECTOMY                     PHYSICAL EXAM:   Mental Status/Neuro: A/A/O   Airway: Facies: Feasible  Mallampati: I  Mouth/Opening: Full  TM distance: > 6 cm  Neck ROM: Full   Respiratory: Auscultation: CTAB     Resp. Rate: Normal     Resp. Effort: Normal      CV: Rhythm: Regular  Rate: Age appropriate  Heart: Normal Sounds   Comments:                      LABS:  CBC:   Lab Results   Component Value Date    WBC 4.5 2019    HGB 13.7  "07/19/2019    HGB 9.7 (L) 03/30/2019    HCT 44.4 03/11/2019     03/11/2019     BMP:   Lab Results   Component Value Date     03/11/2019    POTASSIUM 4.0 03/11/2019    POTASSIUM 4.4 07/22/2011    CHLORIDE 106 03/11/2019    CO2 27 03/11/2019    BUN 11 03/11/2019    CR 0.69 03/11/2019     (H) 03/28/2019    GLC 98 03/11/2019     COAGS: No results found for: PTT, INR, FIBR  POC:   Lab Results   Component Value Date    BGM 79 07/22/2011     OTHER:   Lab Results   Component Value Date    LEEANN 9.0 03/11/2019    TSH 1.43 06/19/2018        Preop Vitals    BP Readings from Last 3 Encounters:   07/19/19 130/86   06/17/19 128/80   06/14/19 140/88    Pulse Readings from Last 3 Encounters:   07/19/19 80   06/17/19 84   06/14/19 94      Resp Readings from Last 3 Encounters:   07/19/19 16   06/17/19 16   06/14/19 14    SpO2 Readings from Last 3 Encounters:   07/19/19 97%   06/17/19 96%   06/14/19 100%      Temp Readings from Last 1 Encounters:   07/19/19 37.1  C (98.7  F) (Temporal)    Ht Readings from Last 1 Encounters:   07/19/19 1.455 m (4' 9.28\")      Wt Readings from Last 1 Encounters:   07/19/19 56.2 kg (123 lb 12.8 oz)    Estimated body mass index is 26.53 kg/m  as calculated from the following:    Height as of 7/19/19: 1.455 m (4' 9.28\").    Weight as of 7/19/19: 56.2 kg (123 lb 12.8 oz).     LDA:  Peripheral IV 07/21/11 Left Hand (Active)   Number of days: 2926       Closed/Suction Drain Left Knee Accordion 10 [place] (Active)   Number of days: 2926        Assessment:   ASA SCORE: 2    H&P: History and physical reviewed and following examination; no interval change.         Plan:   Anes. Type:  General; Peripheral Nerve Block; For Post-op pain in coordination with surgeon     Block Details: Exparel; Single Shot; Adductor C.   Pre-Medication: Acetaminophen; Gabapentin   Induction:  IV (Standard)   Airway: LMA   Access/Monitoring: PIV   Maintenance: Balanced     Postop Plan:   Postop Pain: Opioids; " Regional  Postop Sedation/Airway: Not planned  Disposition: Outpatient     PONV Management:   Adult Risk Factors: Female, Postop Opioids   Prevention: Ondansetron, Dexamethasone     CONSENT: Direct conversation   Plan and risks discussed with: Patient                      Simon Sanders MD     ANESTHESIA PREOP EVALUATION    PROCEDURE: Procedure(s):  Right Knee Mini Open Lysis of Adhesions    HPI: Nisha Perez is a 63 year old female who presents for above procedure 2/2 poor ROM knee.     PAST MEDICAL HISTORY:    Past Medical History:   Diagnosis Date     Anxiety disorder      Gastro-oesophageal reflux disease     esophagitis     Injury of knee, left      Insomnia      Osteoarthritis      Osteoporosis        PAST SURGICAL HISTORY:    Past Surgical History:   Procedure Laterality Date     APPENDECTOMY       ARTHRODESIS ANKLE      left, staples      ARTHROPLASTY KNEE Right 3/28/2019    Procedure: Right Total Knee Arthroplasty;  Surgeon: Monie Dai MD;  Location:  OR     ARTHROPLASTY PATELLO-FEMORAL (KNEE)  2011    Procedure:ARTHROPLASTY PATELLO-FEMORAL (KNEE); Left Knee [place] Arthrolplasty Prosthesis, Removal of hardware left knee; Surgeon:MONIE DAI; Location: OR     ARTHROSCOPY KNEE      left     ARTHROSCOPY KNEE      right, maureen      SECTION      X 4     COMBINED ESOPHAGOSCOPY, GASTROSCOPY, DUODENOSCOPY (EGD) WITH CO2 INSUFFLATION N/A 2019    Procedure: ESOPHAGOGASTRODUODENOSCOPY, WITH CO2 INSUFFLATION;  Surgeon: Juan M Cox MD;  Location:  OR     ESOPHAGOSCOPY, GASTROSCOPY, DUODENOSCOPY (EGD), COMBINED N/A 2019    Procedure: Esophagogastroduodenoscopy, With Biopsy;  Surgeon: Juan M Cox MD;  Location:  OR     ORTHOPEDIC SURGERY      left knee partial knee replacement     TONSILLECTOMY         SOCIAL HISTORY:       Social History     Tobacco Use     Smoking status: Never Smoker     Smokeless tobacco: Never Used   Substance Use  Topics     Alcohol use: Yes     Comment: 4-6 drinks per week       ALLERGIES:     Allergies   Allergen Reactions     Latex Itching     burning     Latex      PN: Converted from LW Latex Sensitivity Flag       MEDICATIONS:       (Not in a hospital admission)    Current Outpatient Medications   Medication Sig Dispense Refill     acetaminophen (TYLENOL) 325 MG tablet Take 3 tablets (975 mg) by mouth every 8 hours 100 tablet 0     esomeprazole (NEXIUM) 40 MG DR capsule Take 40 mg by mouth every morning (before breakfast) Take 30-60 minutes before eating.       fluticasone (FLONASE) 50 MCG/ACT nasal spray Spray 1 spray into both nostrils 2 times daily       ibuprofen (ADVIL/MOTRIN) 100 MG tablet Take 100 mg by mouth every 4 hours as needed       Pseudoephedrine HCl (SUDAFED PO) Take  by mouth at onset of headache.       zolpidem ER (AMBIEN CR) 6.25 MG CR tablet Take 6.25 mg by mouth nightly as needed for sleep         Current Outpatient Medications Ordered in Epic   Medication Sig Dispense Refill     acetaminophen (TYLENOL) 325 MG tablet Take 3 tablets (975 mg) by mouth every 8 hours 100 tablet 0     esomeprazole (NEXIUM) 40 MG DR capsule Take 40 mg by mouth every morning (before breakfast) Take 30-60 minutes before eating.       fluticasone (FLONASE) 50 MCG/ACT nasal spray Spray 1 spray into both nostrils 2 times daily       ibuprofen (ADVIL/MOTRIN) 100 MG tablet Take 100 mg by mouth every 4 hours as needed       Pseudoephedrine HCl (SUDAFED PO) Take  by mouth at onset of headache.       zolpidem ER (AMBIEN CR) 6.25 MG CR tablet Take 6.25 mg by mouth nightly as needed for sleep       No current [facility]-ordered facility-administered medications on file.        PHYSICAL EXAM:    Vitals: T Data Unavailable, P Data Unavailable, BP Data Unavailable, R Data Unavailable, SpO2  , Weight Wt Readings from Last 2 Encounters:   07/19/19 56.2 kg (123 lb 12.8 oz)   06/14/19 55.3 kg (122 lb)       See doc flowsheet    NPO STATUS: see doc  flowsheet    LABS:    BMP:  Recent Labs   Lab Test 03/28/19  0627 03/11/19  1043   NA  --  139   POTASSIUM  --  4.0   CHLORIDE  --  106   CO2  --  27   BUN  --  11   CR  --  0.69   * 98   LEEANN  --  9.0       LFTs:   No results for input(s): PROTTOTAL, ALBUMIN, BILITOTAL, ALKPHOS, AST, ALT, BILIDIRECT in the last 57709 hours.    CBC:   Recent Labs   Lab Test 07/19/19  1149  03/11/19  1043   WBC  --   --  4.5   RBC  --   --  4.45   HGB 13.7   < > 14.1   HCT  --   --  44.4   MCV  --   --  100   MCH  --   --  31.7   MCHC  --   --  31.8   RDW  --   --  14.8   PLT  --   --  245    < > = values in this interval not displayed.       Coags:  No results for input(s): INR, PTT, FIBR in the last 77191 hours.    Imaging:  No orders to display       Simon Sanders MD  Anesthesiology Staff  Pager [phone]    7/25/2019  5:58 PM

## 2019-07-25 NOTE — TELEPHONE ENCOUNTER
[facility] Call Center    Phone Message    May a detailed message be left on voicemail: yes    Reason for Call: Other: Pt needs to know how often Arendt wants pt to go to physical therapy.     Before scheduling those, pt wants to know the doctors recommendations.    Please call pt back.     Action Taken: Message routed to:  [facility] ([facility] ortho

## 2019-07-25 NOTE — TELEPHONE ENCOUNTER
Called patient back and explained that she should start out twice a week until PT decides how often after that. Per Dr. Dai's team

## 2019-07-26 ENCOUNTER — HOSPITAL ENCOUNTER (OUTPATIENT)
Facility: AMBULATORY SURGERY CENTER | Age: 64
End: 2019-07-26
Attending: ORTHOPAEDIC SURGERY
Payer: COMMERCIAL

## 2019-07-26 ENCOUNTER — ANESTHESIA (OUTPATIENT)
Dept: SURGERY | Facility: AMBULATORY SURGERY CENTER | Age: 64
End: 2019-07-26

## 2019-07-26 VITALS
HEART RATE: 73 BPM | TEMPERATURE: 97.7 F | DIASTOLIC BLOOD PRESSURE: 79 MMHG | OXYGEN SATURATION: 98 % | WEIGHT: 123 LBS | HEIGHT: 57 IN | BODY MASS INDEX: 26.54 KG/M2 | SYSTOLIC BLOOD PRESSURE: 146 MMHG | RESPIRATION RATE: 18 BRPM

## 2019-07-26 DIAGNOSIS — M24.669 ARTHROFIBROSIS OF KNEE JOINT, UNSPECIFIED LATERALITY: ICD-10-CM

## 2019-07-26 DIAGNOSIS — Z98.890 STATUS POST KNEE SURGERY: Primary | ICD-10-CM

## 2019-07-26 RX ORDER — FENTANYL CITRATE 50 UG/ML
25-50 INJECTION, SOLUTION INTRAMUSCULAR; INTRAVENOUS
Status: DISCONTINUED | OUTPATIENT
Start: 2019-07-26 | End: 2019-07-27 | Stop reason: HOSPADM

## 2019-07-26 RX ORDER — ONDANSETRON 4 MG/1
4 TABLET, ORALLY DISINTEGRATING ORAL EVERY 30 MIN PRN
Status: DISCONTINUED | OUTPATIENT
Start: 2019-07-26 | End: 2019-07-27 | Stop reason: HOSPADM

## 2019-07-26 RX ORDER — HYDROMORPHONE HYDROCHLORIDE 1 MG/ML
.3-.5 INJECTION, SOLUTION INTRAMUSCULAR; INTRAVENOUS; SUBCUTANEOUS EVERY 10 MIN PRN
Status: DISCONTINUED | OUTPATIENT
Start: 2019-07-26 | End: 2019-07-27 | Stop reason: HOSPADM

## 2019-07-26 RX ORDER — CEFAZOLIN SODIUM 1 G/50ML
1 SOLUTION INTRAVENOUS SEE ADMIN INSTRUCTIONS
Status: DISCONTINUED | OUTPATIENT
Start: 2019-07-26 | End: 2019-07-27 | Stop reason: HOSPADM

## 2019-07-26 RX ORDER — AMOXICILLIN 250 MG
1-2 CAPSULE ORAL 2 TIMES DAILY
Qty: 30 TABLET | Refills: 0 | Status: SHIPPED | OUTPATIENT
Start: 2019-07-26 | End: 2019-10-01

## 2019-07-26 RX ORDER — SODIUM CHLORIDE, SODIUM LACTATE, POTASSIUM CHLORIDE, CALCIUM CHLORIDE 600; 310; 30; 20 MG/100ML; MG/100ML; MG/100ML; MG/100ML
INJECTION, SOLUTION INTRAVENOUS CONTINUOUS
Status: DISCONTINUED | OUTPATIENT
Start: 2019-07-26 | End: 2019-07-27 | Stop reason: HOSPADM

## 2019-07-26 RX ORDER — MEPERIDINE HYDROCHLORIDE 25 MG/ML
12.5 INJECTION INTRAMUSCULAR; INTRAVENOUS; SUBCUTANEOUS
Status: DISCONTINUED | OUTPATIENT
Start: 2019-07-26 | End: 2019-07-27 | Stop reason: HOSPADM

## 2019-07-26 RX ORDER — NALOXONE HYDROCHLORIDE 0.4 MG/ML
.1-.4 INJECTION, SOLUTION INTRAMUSCULAR; INTRAVENOUS; SUBCUTANEOUS
Status: DISCONTINUED | OUTPATIENT
Start: 2019-07-26 | End: 2019-07-27 | Stop reason: HOSPADM

## 2019-07-26 RX ORDER — OXYCODONE HYDROCHLORIDE 5 MG/1
5 TABLET ORAL EVERY 4 HOURS PRN
Status: DISCONTINUED | OUTPATIENT
Start: 2019-07-26 | End: 2019-07-27 | Stop reason: HOSPADM

## 2019-07-26 RX ORDER — ACETAMINOPHEN 325 MG/1
975 TABLET ORAL ONCE
Status: COMPLETED | OUTPATIENT
Start: 2019-07-26 | End: 2019-07-26

## 2019-07-26 RX ORDER — PROPOFOL 10 MG/ML
INJECTION, EMULSION INTRAVENOUS PRN
Status: DISCONTINUED | OUTPATIENT
Start: 2019-07-26 | End: 2019-07-26

## 2019-07-26 RX ORDER — ONDANSETRON 4 MG/1
4-8 TABLET, ORALLY DISINTEGRATING ORAL EVERY 8 HOURS PRN
Qty: 4 TABLET | Refills: 0 | Status: SHIPPED | OUTPATIENT
Start: 2019-07-26 | End: 2019-10-01

## 2019-07-26 RX ORDER — HYDROMORPHONE HYDROCHLORIDE 2 MG/1
2 TABLET ORAL EVERY 4 HOURS PRN
Qty: 20 TABLET | Refills: 0 | Status: SHIPPED | OUTPATIENT
Start: 2019-07-26 | End: 2019-10-01

## 2019-07-26 RX ORDER — PROPOFOL 10 MG/ML
INJECTION, EMULSION INTRAVENOUS CONTINUOUS PRN
Status: DISCONTINUED | OUTPATIENT
Start: 2019-07-26 | End: 2019-07-26

## 2019-07-26 RX ORDER — GABAPENTIN 300 MG/1
300 CAPSULE ORAL ONCE
Status: COMPLETED | OUTPATIENT
Start: 2019-07-26 | End: 2019-07-26

## 2019-07-26 RX ORDER — PREDNISONE 5 MG/1
5 TABLET ORAL DAILY
Qty: 70 TABLET | Refills: 0 | Status: SHIPPED | OUTPATIENT
Start: 2019-07-26 | End: 2019-10-01

## 2019-07-26 RX ORDER — ACETAMINOPHEN 325 MG/1
975 TABLET ORAL ONCE
Status: DISCONTINUED | OUTPATIENT
Start: 2019-07-26 | End: 2019-07-27 | Stop reason: HOSPADM

## 2019-07-26 RX ORDER — CEFAZOLIN SODIUM 2 G/50ML
2 SOLUTION INTRAVENOUS
Status: COMPLETED | OUTPATIENT
Start: 2019-07-26 | End: 2019-07-26

## 2019-07-26 RX ORDER — ONDANSETRON 2 MG/ML
4 INJECTION INTRAMUSCULAR; INTRAVENOUS EVERY 30 MIN PRN
Status: DISCONTINUED | OUTPATIENT
Start: 2019-07-26 | End: 2019-07-27 | Stop reason: HOSPADM

## 2019-07-26 RX ORDER — CETIRIZINE HYDROCHLORIDE 5 MG/1
5 TABLET ORAL DAILY
COMMUNITY

## 2019-07-26 RX ORDER — OXYCODONE HYDROCHLORIDE 5 MG/1
5-10 TABLET ORAL EVERY 4 HOURS PRN
Qty: 24 TABLET | Refills: 0 | Status: SHIPPED | OUTPATIENT
Start: 2019-07-26 | End: 2019-07-26

## 2019-07-26 RX ORDER — BUPIVACAINE HYDROCHLORIDE AND EPINEPHRINE 2.5; 5 MG/ML; UG/ML
INJECTION, SOLUTION INFILTRATION; PERINEURAL PRN
Status: DISCONTINUED | OUTPATIENT
Start: 2019-07-26 | End: 2019-07-26

## 2019-07-26 RX ORDER — ONDANSETRON 2 MG/ML
INJECTION INTRAMUSCULAR; INTRAVENOUS PRN
Status: DISCONTINUED | OUTPATIENT
Start: 2019-07-26 | End: 2019-07-26

## 2019-07-26 RX ORDER — FLUMAZENIL 0.1 MG/ML
0.2 INJECTION, SOLUTION INTRAVENOUS
Status: DISCONTINUED | OUTPATIENT
Start: 2019-07-26 | End: 2019-07-27 | Stop reason: HOSPADM

## 2019-07-26 RX ORDER — DEXAMETHASONE SODIUM PHOSPHATE 4 MG/ML
INJECTION, SOLUTION INTRA-ARTICULAR; INTRALESIONAL; INTRAMUSCULAR; INTRAVENOUS; SOFT TISSUE PRN
Status: DISCONTINUED | OUTPATIENT
Start: 2019-07-26 | End: 2019-07-26

## 2019-07-26 RX ORDER — LIDOCAINE HYDROCHLORIDE 20 MG/ML
INJECTION, SOLUTION INFILTRATION; PERINEURAL PRN
Status: DISCONTINUED | OUTPATIENT
Start: 2019-07-26 | End: 2019-07-26

## 2019-07-26 RX ORDER — OXYCODONE HYDROCHLORIDE 5 MG/1
5-10 TABLET ORAL EVERY 4 HOURS PRN
Qty: 6 TABLET | Refills: 0 | Status: SHIPPED | OUTPATIENT
Start: 2019-07-26 | End: 2019-10-01

## 2019-07-26 RX ADMIN — PROPOFOL 200 MG: 10 INJECTION, EMULSION INTRAVENOUS at 09:18

## 2019-07-26 RX ADMIN — OXYCODONE HYDROCHLORIDE 5 MG: 5 TABLET ORAL at 10:33

## 2019-07-26 RX ADMIN — CEFAZOLIN SODIUM 2 G: 2 SOLUTION INTRAVENOUS at 09:28

## 2019-07-26 RX ADMIN — FENTANYL CITRATE 25 MCG: 50 INJECTION, SOLUTION INTRAMUSCULAR; INTRAVENOUS at 10:31

## 2019-07-26 RX ADMIN — ONDANSETRON 4 MG: 2 INJECTION INTRAMUSCULAR; INTRAVENOUS at 09:28

## 2019-07-26 RX ADMIN — LIDOCAINE HYDROCHLORIDE 80 MG: 20 INJECTION, SOLUTION INFILTRATION; PERINEURAL at 09:18

## 2019-07-26 RX ADMIN — SODIUM CHLORIDE, SODIUM LACTATE, POTASSIUM CHLORIDE, CALCIUM CHLORIDE: 600; 310; 30; 20 INJECTION, SOLUTION INTRAVENOUS at 07:58

## 2019-07-26 RX ADMIN — GABAPENTIN 300 MG: 300 CAPSULE ORAL at 07:58

## 2019-07-26 RX ADMIN — BUPIVACAINE HYDROCHLORIDE AND EPINEPHRINE 10 ML: 2.5; 5 INJECTION, SOLUTION INFILTRATION; PERINEURAL at 08:25

## 2019-07-26 RX ADMIN — ACETAMINOPHEN 975 MG: 325 TABLET ORAL at 07:58

## 2019-07-26 RX ADMIN — PROPOFOL 200 MCG/KG/MIN: 10 INJECTION, EMULSION INTRAVENOUS at 09:19

## 2019-07-26 RX ADMIN — PROPOFOL 50 MG: 10 INJECTION, EMULSION INTRAVENOUS at 09:19

## 2019-07-26 RX ADMIN — DEXAMETHASONE SODIUM PHOSPHATE 4 MG: 4 INJECTION, SOLUTION INTRA-ARTICULAR; INTRALESIONAL; INTRAMUSCULAR; INTRAVENOUS; SOFT TISSUE at 09:28

## 2019-07-26 RX ADMIN — FENTANYL CITRATE 50 MCG: 50 INJECTION, SOLUTION INTRAMUSCULAR; INTRAVENOUS at 09:41

## 2019-07-26 RX ADMIN — FENTANYL CITRATE 50 MCG: 50 INJECTION, SOLUTION INTRAMUSCULAR; INTRAVENOUS at 08:20

## 2019-07-26 RX ADMIN — FENTANYL CITRATE 50 MCG: 50 INJECTION, SOLUTION INTRAMUSCULAR; INTRAVENOUS at 10:23

## 2019-07-26 RX ADMIN — FENTANYL CITRATE 25 MCG: 50 INJECTION, SOLUTION INTRAMUSCULAR; INTRAVENOUS at 10:13

## 2019-07-26 ASSESSMENT — MIFFLIN-ST. JEOR: SCORE: 986.8

## 2019-07-26 NOTE — ANESTHESIA CARE TRANSFER NOTE
Patient: Nisha Perez    Procedure(s):  Right Knee Mini Open Lysis of Adhesions    Diagnosis: Knee Arthrofibrosis  Diagnosis Additional Information: No value filed.    Anesthesia Type:   General, Peripheral Nerve Block, For Post-op pain in coordination with surgeon     Note:  Airway :Room Air  Patient transferred to:PACU  Comments: Wesley Report: Identifed the Patient, Identified the Reponsible Provider, Reviewed the pertinent medical history, Discussed the surgical course, Reviewed Intra-OP anesthesia mangement and issues during anesthesia, Set expectations for post-procedure period and Allowed opportunity for questions and acknowledgement of understanding      Vitals: (Last set prior to Anesthesia Care Transfer)    CRNA VITALS  7/26/2019 0928 - 7/26/2019 1003      7/26/2019             Pulse:  79    Ht Rate:  79    SpO2:  97 %    Resp Rate (observed):  1  (Abnormal)     Resp Rate (set):  10                Electronically Signed By: MAKENNA Chávez CRNA  July 26, 2019  10:03 AM   Detail Level: Detailed no vomiting/no nausea/no fever/no chills, no HA

## 2019-07-26 NOTE — ANESTHESIA PROCEDURE NOTES
Peripheral Nerve Block Procedure Note    Staff:     Anesthesiologist:  Simon Sanders MD  Location: Pre-op  Procedure Start/Stop TImes:      7/26/2019 8:20 AM     7/26/2019 8:25 AM    patient identified, IV checked, site marked, risks and benefits discussed, informed consent, monitors and equipment checked, pre-op evaluation, at physician/surgeon's request and post-op pain management      Correct Patient: Yes      Correct Position: Yes      Correct Site: Yes      Correct Procedure: Yes      Correct Laterality:  Yes    Site Marked:  Yes  Procedure details:     Procedure:  Adductor canal    ASA:  2    Diagnosis:  R tka    Laterality:  Right    Position:  Supine    Sterile Prep: chloraprep, mask and sterile gloves      Needle:  Short bevel    Needle gauge:  21    Needle length (mm):  110    Ultrasound: Yes      Ultrasound used to identify targeted nerve, plexus, or vascular structure and placed a needle adjacent to it      Permanent Image entered into patiient's record      Abnormal pain on injection: No      Blood Aspirated: No      Paresthesias:  No    Bleeding at site: No      Bolus via:  Needle    Infusion Method:  Single Shot    Complications:  None  Assessment/Narrative:     Injection made incrementally with aspirations every (mL):  2     Discussed with Patient Off-Label use of Liposomal Bupivacaine (Exparel) for Nerve Block.    Relevant risks & benefits were discussed with patient.    All questions were answered and there was agreement to proceed.    Patient signed Off-Label Use of Exparel Consent Form.      133mg of Exparel injected

## 2019-07-26 NOTE — DISCHARGE INSTRUCTIONS
"[facility]  Home Care Following Anesthesia  For 24 hours after surgery:  1. Get plenty of rest.  A responsible adult must stay with you for at least 24 hours after you leave the surgery center.  2. Do not drive or use heavy equipment.  If you have weakness or tingling, don't drive or use heavy equipment until this feeling goes away.   3. Do not drink alcohol.   4. Avoid strenuous or risky activities.  Ask for help when climbing stairs.  5. You may feel lightheaded.  IF so, sit for a few minutes before standing.  Have someone help you get up.   6. If you have nausea (feel sick to your stomach): Drink only clear liquids such as apple juice, ginger ale, broth or 7-Up.  Rest may also help.  Be sure to drink enough fluids.  Move to a regular diet as you feel able.   7. You may have a slight fever.  Call the doctor if your fever is over 100 F (37.7 C) (taken under the tongue) or lasts longer than 24 hours.  8. You may have a dry mouth, a sore throat, muscle aches or trouble sleeping. These should go away after 24 hours.  9. Do not make important or legal decisions.        Today you received an Exparel block to numb the nerves near your surgery site.  This is a block using local anesthetic or \"numbing\" medication injected around the nerves to anesthetize or \"numb\" the area supplied by those nerves.  This block is injected into the muscle layer near your surgical site.  This medication may numb the location where you had surgery up to 72 hours.  If your surgical site is an arm or leg you should be careful with your affected limb, since it is possible to injure your limb without being aware of it due to the numbing.  Until full feeling returns, you should guard against bumping or hitting your limb, and avoid extreme hot or cold temperatures on the skin.  As the block wears off, the feeling will return as a tingling or prickly sensation near your surgical site.  You will experince more " discomfort from your incision as the feeling returns.  You may want to take a pain pill (a narcotic or Tylenol if this was prescribed by your surgeon) when you start to experience mild pain before the pain beomes more severe.  If your pain medications do not control your pain, you should notify your surgeon.    Tips for taking pain medications  To get the best pain relief possible, remember these points:    Take pain medications as directed, before pain becomes severe.    Pain medication can upset your stomach: taking it with food may help.    Constipation is a common side effect of pain medication. Drink plenty of  fluids.    Eat foods high in fiber. Take a stool softener if recommended by your doctor or pharmacist.    Do not drink alcohol, drive or operate machinery while taking pain medications.    Ask about other ways to control pain, such as with heat, ice or relaxation.    Tylenol/Acetaminophen Consumption  To help encourage the safe use of acetaminophen, the makers of TYLENOL  have lowered the maximum daily dose for single-ingredient Extra Strength TYLENOL  (acetaminophen) products sold in the [place] from 8 pills per day (4,000 mg) to 6 pills per day (3,000 mg). The dosing interval has also changed from 2 pills every 4-6 hours to 2 pills every 6 hours.    If you feel your pain relief is insufficient, you may take Tylenol/Acetaminophen in addition to your narcotic pain medication.     Be careful not to exceed 3,000 mg of Tylenol/Acetaminophen in a 24 hour period from all sources.    If you are taking extra strength Tylenol/acetaminophen (500 mg), the maximum dose is 6 tablets in 24 hours.    If you are taking regular strength acetaminophen (325 mg), the maximum dose is 9 tablets in 24 hours.    Tylenol 975mg given at 7:58am. Next dose available after 1:58pm. Then follow package instructions.     Call a doctor for any of the followin. Signs of infection (fever, growing tenderness at the surgery site, a large  "amount of drainage or bleeding, severe pain, foul-smelling drainage, redness, swelling).  2. It has been over 8 to 10 hours since surgery and you are still not able to urinate (pass water).  3. Headache for over 24 hours.    Your doctor is:  Dr. Monie Dai, Orthopaedics: [phone]                    Or dial [phone] and ask for the resident on call for:  Orthopaedics  For emergency care, call the:  [facility] Emergency Department: [phone] (TTY for hearing impaired: [phone])    Information about liposomal bupivacaine (Exparel)    What is Liposomal Bupivacaine?    Liposomal Bupivacaine is a numbing medication that can help you manage your pain after surgery.  This medication is similar to \"novacaine,\" which is often used by the dentist.  Liposomal bupivacaine is released slowly and can help control pain for up to 72 hours.    What is the purpose of Liposomal Bupivacaine?    To manage your pain after surgery    To help you sleep better, take deep breaths, walk more comfortable, and feel up to visiting with others    How is the procedure done?    Liposomal bupivacaine is a medication given by an injection.    It is usually given right before your surgery.  If this is the case, you will be awake or sedated, but you should experience minimal pain during the procedure.    For some people, the injection may be given at the very end of your surgery.  It all depends on the type of surgery and your situation.    The procedure usually takes about 5-15 minutes.  An ultrasound machine will help the anesthesiologist insert it in the right place or the surgeon will inject it under direct vision.     A needle is used to place the numbing medication under your skin.  It provides pain relief by numbing the tissue in the area where your surgeon will make the incision.    What can I expect?    You may experience numbness, tingling, or a feeling of heaviness around the area that was injected.    If you experience " any of the follow symptoms IMMEDIATELY CALL THE REGIONAL ANESTHESIA PAIN SERVICE:    Numbness or tingling occurs in areas other than around the injection site    Blurry vision    Ringing in your ears    A metallic taste in your mouth    PAGE: Dial [phone].  When prompted, enter the following 4-digit ID number:  [id number].  You will be prompted to enter your phone number; and then enter the # sign.  The clinician on call will call you back.    OR    CALL: Dial [phone].  Let the hospital  know that you are having a problem with a nerve block and that you would like to speak to the regional anesthesia pain service right away.    You should not receive any other type of numbing medication within 4 days after receiving liposomal bupivacaine unless your anesthesiologist approves.    Post Operative Instructions: Regional Anesthetic for Lower Extremity with Liposomal Bupivacaine  General Information:   Regional anesthesia is when local anesthetic or  numbing  medication is injected around the nerves to anesthetize or  numb  the area supplied by that set of nerves. It is a type of analgesia used to control pain and decreases the need for narcotics following surgery.    Types of Regional Blocks:  Femoral: A block injected into the groin area of the operative leg of a patient having thigh or knee surgery.  Adductor Canal: A block injected into the mid thigh of the operative leg of a patient having knee or ankle surgery.  Popliteal or Distal Sciatic: A block injected into the back of the knee of the operative leg of patients having foot or ankle surgery.   Ankle:  An anesthetic medication is injected into the ankle of the operative leg of a patient having foot or toe surgery.     Procedure:   The type of anesthesia your doctor used to numb your leg will usually not wear off for 24-48 hours, but may last as long as 72 hours. You should be careful during that period, since it is possible to injure your leg without  "being aware of the injury. While your leg is numb you should:    Use crutches (minimal weight bearing until your motor and strength is completely back to normal)    Avoid striking or bumping your leg    Avoid extreme hot or cold    Discomfort:  You will have a tingling and prickly sensation in your leg as the feeling begins to return; you can also expect some discomfort. The amount of discomfort is unpredictable, but if you have more pain than can be controlled with pain medication, you should notify your physician.     Pain Medicine:   Begin taking your oral pain pills before bedtime and during the night to avoid a sudden onset of pain as part of the block wears off. Do not engage in drinking, driving, or hazardous occupations while taking pain medication.       Safety Tips for Using Crutches    Crutch Fit:    Assume good standing posture with shoulders relaxed and crutch tips 6-8 inches out from the side of the foot.    The underarm pad should fall 2-3 fingers width below the armpit.    The handgrip is positioned level with the wrist to allow 30  flexion at the elbow.    Safety Tips:    Bear weight on your hands, not on your armpits.    Do not add extra padding to the underarm pad. This will, in effect, lengthen the crutches and increase risk of nerve injury.    Wear flat, properly fitting shoes. Do not walk in stocking feet, high heels or slippers.    Household hazards:  --Throw rugs should be removed from floors.  --Stairs should be cleared of obstacles.  --Use extra caution on slippery, highly polished, littered or uneven floor surfaces.  --Check for electric cords.    Check crutch tips for excessive wear and keep wing nuts tight.    While walking, look forward with  head up  and  eyes open.  Take equal length steps.    Use BOTH crutches.    Stairs Sequence:    UP: \"Good\" leg first, followed by  bad  leg, then crutches.    DOWN: Crutches, followed by  bad  leg, \"good\" leg.     Walking with Crutches:    Move " both crutches forward at the same time.    Non-Weight Bearing (NWB):  Hold the involved leg up and swing through the crutches with the involved leg. The involved leg does not touch the floor.    Toe Touch Weight Bearing (TTWB): Move the involved leg forward. Rest it lightly on the floor for balance only. Step through the crutches with the uninvolved leg.    Partial Weight Bearing (PWB): Move the involved leg forward. Step down the weight of the leg only.  Step through the crutches with the uninvolved leg.    Weight Bearing As Tolerated (WBAT): Move the involved leg forward. Put as much pressure through the involved leg as you can tolerate comfortably. Then step through the crutches with the uninvolved leg.

## 2019-07-26 NOTE — ANESTHESIA POSTPROCEDURE EVALUATION
Anesthesia POST Procedure Evaluation    Patient: Nisha Perez   MRN:     [id number] Gender:   female   Age:    63 year old :      1955        Preoperative Diagnosis: Knee Arthrofibrosis   Procedure(s):  Right Knee Mini Open Lysis of Adhesions   Postop Comments: No value filed.       Anesthesia Type:  Not documented  General, Peripheral Nerve Block, For Post-op pain in coordination with surgeon    Reportable Event: NO     PAIN: Uncomplicated   Sign Out status: Comfortable, Well controlled pain     PONV: No PONV   Sign Out status:  No Nausea or Vomiting     Neuro/Psych: Uneventful perioperative course   Sign Out Status: Preoperative baseline; Age appropriate mentation     Airway/Resp.: Uneventful perioperative course   Sign Out Status: Non labored breathing, age appropriate RR; Resp. Status within EXPECTED Parameters     CV: Uneventful perioperative course   Sign Out status: Appropriate BP and perfusion indices; Appropriate HR/Rhythm     Disposition:   Sign Out in:  PACU  Disposition:  Phase II; Home  Recovery Course: Uneventful  Follow-Up: Not required           Last Anesthesia Record Vitals:  CRNA VITALS  2019 0928 - 2019 1022      2019             Pulse:  79    Ht Rate:  79    SpO2:  97 %    Resp Rate (observed):  1  (Abnormal)     Resp Rate (set):  10          Last PACU Vitals:  Vitals Value Taken Time   /83 2019 10:15 AM   Temp 36.2  C (97.2  F) 2019 10:15 AM   Pulse 64 2019 10:15 AM   Resp 8 2019 10:20 AM   SpO2 97 % 2019 10:20 AM   Temp src     NIBP     Pulse     SpO2     Resp     Temp     Ht Rate     Temp 2     Vitals shown include unvalidated device data.      Electronically Signed By: Simon Sanders MD, 2019, 10:22 AM

## 2019-07-26 NOTE — OR NURSING
Patient received right side Adductor nerve block  with Exparel.  Fentanyl 50mcg and Versed 1mg given. Tolerated procedure well.

## 2019-07-26 NOTE — BRIEF OP NOTE
Orthopaedic Surgery Brief Op-Note     Patient: Nisha Perez  : 1955  Date of Service: 2019 10:10 AM    Preoperative Diagnosis: Knee Arthrofibrosis     Postoperative Diagnosis: same    Procedure: Procedure(s):  Right Knee Mini Open Lysis of Adhesions    Surgeon: Dr. Dai    Assistants:  Jonathan Easton PA-C    Anesthesia: General     Estimated Blood Loss: 20 cc    Tourniquet Time: 0 minutes at      Specimen: none       Complications: none    Condition: stable    Findings: please see dictated operative note    Plan:    WBAT with assistive devices as needed    CPM to be set at 70 degrees to flexion tolerance with goal of 120 degrees; advance aggressively in 5 degree increments as tolerated by the patient    Lysis of adhesions prednisone taper:  POD #0-10: Take four 5mg tablets daily (20mg/day x 10 days)   POD #11-20: Take two 5mg tablets daily (10mg/day x 10 days)   POD #21-30: Take one 5mg tablet daily (5mg/day x 10 days)     PT as outpatient; a referral for PT will be provided to the patient at discharge    ADAT    Pain control with orals prn - patient will trial oxycodone initially; also provided hydromorphone script to be filled simultaneously if she experiences known side effects of n/v with oxycodone    Bowel prophylaxis with senna-docusate, polytethylene glycol OTC as needed    DVT prophylaxis: mechanical only     Future Appointments   Date Time Provider Department Center   2019  1:50 PM Alan Rod, PT [facility]   2019 12:10 PM Alan Rod, PT [facility]   2019 10:00 AM Nurse, [facility]       Disposition: patient may be discharged with  once appropriate discharge criteria have been met    I assisted with positioning, prepping and draping, and closure. No residents were available to assist with this case    Jonathan Easton PA-C  Orthopaedic Surgery    Please page me at [phone] with any questions/concerns. If there is no response, if it  is a weekend, or if it is during evening hours, please page the orthopaedic surgery resident on call.

## 2019-07-27 NOTE — OP NOTE
Procedure Date: 2019      PREOPERATIVE DIAGNOSES:   1.  Right knee mild arthrofibrosis.   2.  Status post previous total knee replacement.      POSTOPERATIVE DIAGNOSES:   1.  Right knee mild arthrofibrosis.   2.  Status post previous total knee replacement.      OPERATION:  Right knee mini open lysis of adhesions with closed manipulation.      :  Monie Lloyd MD       ANESTHESIA:  General supplemented with adductor nerve block.        PROCEDURE:  Preoperative range of motion was range of motion 0-108 compared to 0-150 on the opposite side.      Postop range of motion, maximum passive range of motion 140, gravity only 120.      PROCEDURE:  Under general anesthesia, the patient was positioned supine on the operating table.  The patient's leg was prepped and draped in the usual sterile fashion.  A pause was performed identifying the correct leg and the administration of antibiotics.  A dose of TXA as well as a dose of Solu-Cortef was also administered.      Using a large-bore cannula, we made an incision on the superolateral aspect of the knee.  We then lysed the adhesions between the capsule and the femur.  We then did a closed manipulation.  Findings as above.      We then ran a liter of antibiotic fluid through the knee.      Sutures were closed with simple sutures of nylon.      The patient tolerated the procedure well and was taken to the recovery room in satisfactory condition.  She will be maintained weightbearing as tolerates.  She has a bicycle at home, so she will not use CPM postop.  She will be on a prednisone taper for 30 days starting at 20 mg and ending at 5.         MONIE LLOYD MD             D: 2019   T: 2019   MT: ana      Name:     SALTY CAST   MRN:      [id number]        Account:        [id number]   :      1955           Procedure Date: 2019      Document: [id number]

## 2019-07-29 ENCOUNTER — THERAPY VISIT (OUTPATIENT)
Dept: PHYSICAL THERAPY | Facility: CLINIC | Age: 64
End: 2019-07-29
Payer: COMMERCIAL

## 2019-07-29 ENCOUNTER — TRANSFERRED RECORDS (OUTPATIENT)
Dept: HEALTH INFORMATION MANAGEMENT | Facility: CLINIC | Age: 64
End: 2019-07-29

## 2019-07-29 DIAGNOSIS — M25.561 ACUTE PAIN OF RIGHT KNEE: ICD-10-CM

## 2019-07-29 PROCEDURE — 97140 MANUAL THERAPY 1/> REGIONS: CPT | Mod: GP | Performed by: PHYSICAL THERAPIST

## 2019-07-29 PROCEDURE — 97110 THERAPEUTIC EXERCISES: CPT | Mod: GP | Performed by: PHYSICAL THERAPIST

## 2019-07-29 PROCEDURE — 97161 PT EVAL LOW COMPLEX 20 MIN: CPT | Mod: GP | Performed by: PHYSICAL THERAPIST

## 2019-07-29 ASSESSMENT — ACTIVITIES OF DAILY LIVING (ADL)
AS_A_RESULT_OF_YOUR_KNEE_INJURY,_HOW_WOULD_YOU_RATE_YOUR_CURRENT_LEVEL_OF_DAILY_ACTIVITY?: ABNORMAL
KNEEL ON THE FRONT OF YOUR KNEE: ACTIVITY IS MINIMALLY DIFFICULT
STAND: ACTIVITY IS MINIMALLY DIFFICULT
GO DOWN STAIRS: ACTIVITY IS MINIMALLY DIFFICULT
RAW_SCORE: 47
HOW_WOULD_YOU_RATE_THE_OVERALL_FUNCTION_OF_YOUR_KNEE_DURING_YOUR_USUAL_DAILY_ACTIVITIES?: ABNORMAL
LIMPING: I HAVE THE SYMPTOM BUT IT DOES NOT AFFECT MY ACTIVITY
RISE FROM A CHAIR: ACTIVITY IS MINIMALLY DIFFICULT
PAIN: THE SYMPTOM AFFECTS MY ACTIVITY MODERATELY
KNEE_ACTIVITY_OF_DAILY_LIVING_SUM: 47
WEAKNESS: I HAVE THE SYMPTOM BUT IT DOES NOT AFFECT MY ACTIVITY
SIT WITH YOUR KNEE BENT: ACTIVITY IS MINIMALLY DIFFICULT
GO UP STAIRS: ACTIVITY IS SOMEWHAT DIFFICULT
GIVING WAY, BUCKLING OR SHIFTING OF KNEE: I DO NOT HAVE THE SYMPTOM
STIFFNESS: THE SYMPTOM AFFECTS MY ACTIVITY SEVERELY
HOW_WOULD_YOU_RATE_THE_CURRENT_FUNCTION_OF_YOUR_KNEE_DURING_YOUR_USUAL_DAILY_ACTIVITIES_ON_A_SCALE_FROM_0_TO_100_WITH_100_BEING_YOUR_LEVEL_OF_KNEE_FUNCTION_PRIOR_TO_YOUR_INJURY_AND_0_BEING_THE_INABILITY_TO_PERFORM_ANY_OF_YOUR_USUAL_DAILY_ACTIVITIES?: 75
WALK: ACTIVITY IS MINIMALLY DIFFICULT
SQUAT: ACTIVITY IS VERY DIFFICULT
SWELLING: THE SYMPTOM AFFECTS MY ACTIVITY SLIGHTLY
KNEE_ACTIVITY_OF_DAILY_LIVING_SCORE: 67.14

## 2019-07-29 NOTE — LETTER
[facility] A[facility]IC [facility] PHYSICAL THERAPY  [address]  [facility] [id number] [phone]    2019    Re: Nisha Perez   :   1955  MRN:  [id number]   REFERRING PHYSICIAN:   Mehran Salgado    [facility] [place] PHYSICAL [facility]    Date of Initial Evaluation:  19  Visits:  Rxs Used: 1  Reason for Referral:  Acute pain of right knee    EVALUATION SUMMARY    [facility] Initial Evaluation  Subjective:  PT is seen s/p GREG, mini open lysis of the L knee following a partial TKA on the R side. Procedure was done on 19. Pt with referral for PT 2x/week for rehabilitation. Pain in the knee is rated at 2-3/10 level in the knee, no other complaints except for the stiffness and tightness. PT with referral dated 19 for evaluate and treat.     The history is provided by the patient. No  was used.   Type of problem:  Right knee   Condition occurred with:  Insidious onset. This is a new condition    Patient reports pain:  In the joint, anterior, lateral and medial. Radiates to:  Knee. Associated symptoms:  Loss of motion/stiffness and loss of strength. Symptoms are exacerbated by standing and relieved by rest.    Where condition occurred: other.  and reported as 3/10 on pain scale. General health as reported by patient is good. Pertinent medical history includes:  Osteoarthritis and menopausal.  Medical allergies: See EPIC.  Surgeries include:  Orthopedic surgery.  Current medications:  Sleep medication, steroids and pain medication.   Primary job tasks: Routing house work, kayaking   Pain is described as aching and is constant. Pain is the same all the time. Since onset symptoms are unchanged. Special tests:  Other. Previous treatment includes physical therapy. There was significant improvement following previous treatment.   Patient is retired . Restrictions include:  Working in normal job  without restrictions.    Barriers include:  None as reported by patient.  Red flags:  None as reported by patient.    Objective:       Knee Evaluation:  ROM:    AROM  Extension: Left:    Right:  0  Flexion: Left:   Right: 115    Strength:   Extension:  Left: 5/5   Pain:      Right: 5/5   Pain:  Flexion:  Left: 5/5   Pain:      Right: 5/5   Pain:      Ligament Testing:  Not Assessed    Special Tests: Not Assessed    Palpation:  Palpation of knee: Incisional     Edema:    Circumference:  Joint Line:  Left:  37cm   Right:  39.5cm    Mobility Testing:    Patellofemoral Medial:  Right: normal  Patellofemoral Lateral:  Right: normal  Patellofemoral Superior:  Right: normal  Patellofemoral Inferior:  Right: normal    Assessment/Plan:    Patient is a 63 year old female with right side knee complaints.    Patient has the following significant findings with corresponding treatment plan.                Diagnosis 1:  S/p GREG R knee   Pain -  hot/cold therapy, manual therapy, self management, education, directional preference exercise and home program  Decreased ROM/flexibility - manual therapy, therapeutic exercise and home program  Edema - cold therapy and self management/home program  Decreased function - therapeutic activities and home program    Therapy Evaluation Codes:   1) History comprised of:   Personal factors that impact the plan of care:      Overall behavior pattern.    Comorbidity factors that impact the plan of care are:      Osteoarthritis.     Medications impacting care: Steroids and Sleep.  2) Examination of Body Systems comprised of:   Body structures and functions that impact the plan of care:      Knee.   Activity limitations that impact the plan of care are:      Jumping, Squatting/kneeling, Stairs, Standing and Walking.  3) Clinical presentation characteristics are:   Stable/Uncomplicated.  4) Decision-Making    Low complexity using standardized patient assessment instrument and/or measureable assessment  of functional outcome.  Cumulative Therapy Evaluation is: Low complexity.    Previous and current functional limitations:  (See Goal Flow Sheet for this information)    Short term and Long term goals: (See Goal Flow Sheet for this information)     Communication ability:  Patient appears to be able to clearly communicate and understand verbal and written communication and follow directions correctly.    Treatment Explanation - The following has been discussed with the patient:   RX ordered/plan of care  Anticipated outcomes  Possible risks and side effects  This patient would benefit from PT intervention to resume normal activities.   Rehab potential is good.    Frequency:  2 X week, once daily  Duration:  for 6 weeks  Discharge Plan:  Achieve all LTG.  Independent in home treatment program.  Reach maximal therapeutic benefit.      Thank you for your referral.    INQUIRIES  Therapist: Alan Rod    [facility] [place] [facility]  [address] [place] [address] [address]  [facility] [address]  Phone: [phone]  Fax: [phone]

## 2019-07-29 NOTE — PROGRESS NOTES
[facility] Initial Evaluation  Subjective:  PT is seen s/p GREG, mini open lysis of the L knee following a partial TKA on the R side. Procedure was done on 7/26/19. Pt with referral for PT 2x/week for rehabilitation. Pain in the knee is rated at 2-3/10 level in the knee, no other complaints except for the stiffness and tightness. PT with referral dated 7-26-19 for evaluate and treat.     The history is provided by the patient. No  was used.   Type of problem:  Right knee   Condition occurred with:  Insidious onset. This is a new condition    Patient reports pain:  In the joint, anterior, lateral and medial. Radiates to:  Knee. Associated symptoms:  Loss of motion/stiffness and loss of strength. Symptoms are exacerbated by standing and relieved by rest.    Where condition occurred: other.  and reported as 3/10 on pain scale. General health as reported by patient is good. Pertinent medical history includes:  Osteoarthritis and menopausal.  Medical allergies: See EPIC.  Surgeries include:  Orthopedic surgery.  Current medications:  Sleep medication, steroids and pain medication.   Primary job tasks: Routing house work, kayaking   Pain is described as aching and is constant. Pain is the same all the time. Since onset symptoms are unchanged. Special tests:  Other. Previous treatment includes physical therapy. There was significant improvement following previous treatment.   Patient is retired . Restrictions include:  Working in normal job without restrictions.    Barriers include:  None as reported by patient.  Red flags:  None as reported by patient.                      Objective:  System                                                Knee Evaluation:  ROM:    AROM      Extension: Left:    Right:  0  Flexion: Left:   Right: 115        Strength:     Extension:  Left: 5/5   Pain:      Right: 5/5   Pain:  Flexion:  Left: 5/5   Pain:      Right: 5/5   Pain:        Ligament Testing:   Not Assessed                Special Tests: Not Assessed      Palpation:  Palpation of knee: Incisional       Edema:    Circumference:      Joint Line:  Left:  37cm   Right:  39.5cm    Mobility Testing:      Patellofemoral Medial:  Right: normal  Patellofemoral Lateral:  Right: normal  Patellofemoral Superior:  Right: normal  Patellofemoral Inferior:  Right: normal        General     ROS    Assessment/Plan:    Patient is a 63 year old female with right side knee complaints.    Patient has the following significant findings with corresponding treatment plan.                Diagnosis 1:  S/p GREG R knee   Pain -  hot/cold therapy, manual therapy, self management, education, directional preference exercise and home program  Decreased ROM/flexibility - manual therapy, therapeutic exercise and home program  Edema - cold therapy and self management/home program  Decreased function - therapeutic activities and home program    Therapy Evaluation Codes:   1) History comprised of:   Personal factors that impact the plan of care:      Overall behavior pattern.    Comorbidity factors that impact the plan of care are:      Osteoarthritis.     Medications impacting care: Steroids and Sleep.  2) Examination of Body Systems comprised of:   Body structures and functions that impact the plan of care:      Knee.   Activity limitations that impact the plan of care are:      Jumping, Squatting/kneeling, Stairs, Standing and Walking.  3) Clinical presentation characteristics are:   Stable/Uncomplicated.  4) Decision-Making    Low complexity using standardized patient assessment instrument and/or measureable assessment of functional outcome.  Cumulative Therapy Evaluation is: Low complexity.    Previous and current functional limitations:  (See Goal Flow Sheet for this information)    Short term and Long term goals: (See Goal Flow Sheet for this information)     Communication ability:  Patient appears to be able to clearly communicate and  understand verbal and written communication and follow directions correctly.  Treatment Explanation - The following has been discussed with the patient:   RX ordered/plan of care  Anticipated outcomes  Possible risks and side effects  This patient would benefit from PT intervention to resume normal activities.   Rehab potential is good.    Frequency:  2 X week, once daily  Duration:  for 6 weeks  Discharge Plan:  Achieve all LTG.  Independent in home treatment program.  Reach maximal therapeutic benefit.    Please refer to the daily flowsheet for treatment today, total treatment time and time spent performing 1:1 timed codes.

## 2019-08-01 ENCOUNTER — THERAPY VISIT (OUTPATIENT)
Dept: PHYSICAL THERAPY | Facility: CLINIC | Age: 64
End: 2019-08-01
Payer: COMMERCIAL

## 2019-08-01 DIAGNOSIS — M25.561 ACUTE PAIN OF RIGHT KNEE: ICD-10-CM

## 2019-08-01 PROCEDURE — 97140 MANUAL THERAPY 1/> REGIONS: CPT | Mod: GP | Performed by: PHYSICAL THERAPIST

## 2019-08-01 PROCEDURE — 97110 THERAPEUTIC EXERCISES: CPT | Mod: GP | Performed by: PHYSICAL THERAPIST

## 2019-08-07 ENCOUNTER — THERAPY VISIT (OUTPATIENT)
Dept: PHYSICAL THERAPY | Facility: CLINIC | Age: 64
End: 2019-08-07
Payer: COMMERCIAL

## 2019-08-07 DIAGNOSIS — M25.561 ACUTE PAIN OF RIGHT KNEE: ICD-10-CM

## 2019-08-07 PROCEDURE — 97140 MANUAL THERAPY 1/> REGIONS: CPT | Mod: GP | Performed by: PHYSICAL THERAPIST

## 2019-08-07 PROCEDURE — 97110 THERAPEUTIC EXERCISES: CPT | Mod: GP | Performed by: PHYSICAL THERAPIST

## 2019-08-09 ENCOUNTER — OFFICE VISIT (OUTPATIENT)
Dept: ORTHOPEDICS | Facility: CLINIC | Age: 64
End: 2019-08-09
Payer: COMMERCIAL

## 2019-08-09 DIAGNOSIS — Z98.890 S/P RIGHT KNEE SURGERY: Primary | ICD-10-CM

## 2019-08-09 NOTE — PROGRESS NOTES
"Reason for visit:    Nisha Perez came in to the clinic for a two week post op check.    Her surgery was done on 7/26/19 by Dr Dai.  She had a right knee mini open lysis of adhesions.     Assessment:    Nisha came into the clinic in walking with no assistive device.    The Surgical wounds were exposed and found to be well-healed; so the sutures were removed.    She states that she still feels a \"belt\" of tightness around her knee from front to back running through the middle of the patella.  Her PT is going well and she has started some patellar mobilization.  Her last ROM was 0-129.  She is taking Advil and some Asprin for the pain.      Plan:     She has an appointment to see Dr. Dai at that time Dr. Dai will determine further restrictions.    She has our phone number and will call with questions or problems.        "

## 2019-08-12 ENCOUNTER — THERAPY VISIT (OUTPATIENT)
Dept: PHYSICAL THERAPY | Facility: CLINIC | Age: 64
End: 2019-08-12
Payer: COMMERCIAL

## 2019-08-12 DIAGNOSIS — M25.561 ACUTE PAIN OF RIGHT KNEE: ICD-10-CM

## 2019-08-12 PROCEDURE — 97140 MANUAL THERAPY 1/> REGIONS: CPT | Mod: GP | Performed by: PHYSICAL THERAPIST

## 2019-08-12 PROCEDURE — 97110 THERAPEUTIC EXERCISES: CPT | Mod: GP | Performed by: PHYSICAL THERAPIST

## 2019-08-20 ENCOUNTER — THERAPY VISIT (OUTPATIENT)
Dept: PHYSICAL THERAPY | Facility: CLINIC | Age: 64
End: 2019-08-20
Payer: COMMERCIAL

## 2019-08-20 DIAGNOSIS — M25.561 ACUTE PAIN OF RIGHT KNEE: ICD-10-CM

## 2019-08-20 PROCEDURE — 97110 THERAPEUTIC EXERCISES: CPT | Mod: GP | Performed by: PHYSICAL THERAPIST

## 2019-08-20 PROCEDURE — 97140 MANUAL THERAPY 1/> REGIONS: CPT | Mod: GP | Performed by: PHYSICAL THERAPIST

## 2019-08-20 NOTE — PROGRESS NOTES
Subjective:  HPI                    Objective:  System    Physical Exam    General     ROS    Assessment/Plan:    PROGRESS  REPORT    Progress reporting period is from 7/29/19 to 8/20/19.       SUBJECTIVE  Subjective changes noted by patient: Pt reports the knee feels stiff, pain is mild at 2-3/10 level, States she continues to take Advil every day for the pain. She reports no difficulty with ADL's or home activities yard work. States she had soreness following several hours of yard work or house work. Pain rating at 2-3/10 overall.     Current Pain level: (2-3/10).     Previous pain level was  (2-3/10) Initial Pain level: 3/10.   Changes in function:  Yes (See Goal flowsheet attached for changes in current functional level)  Adverse reaction to treatment or activity: None    OBJECTIVE  Changes noted in objective findings:    Objective: Gait without deficits. AROM 0-125, PROM 0-136 degrees. Patellar mobility remains 80% full superior and inferior, full medial and lateral. Tenderness remains in lateral hamstrings. Non tender over scars.      ASSESSMENT/PLAN  Updated problem list and treatment plan: Diagnosis 1:  S/p Mini open lysis, GREG L knee  Pain -  hot/cold therapy, manual therapy, self management, education, directional preference exercise and home program  Decreased ROM/flexibility - manual therapy, therapeutic exercise and home program  Impaired muscle performance - neuro re-education and home program  Decreased function - therapeutic activities and home program  STG/LTGs have been met or progress has been made towards goals:  Yes (See Goal flow sheet completed today.)  Assessment of Progress: The patient's condition is improving.  The patient's condition has potential to improve.  Self Management Plans:  Patient has been instructed in a home treatment program.  Patient  has been instructed in self management of symptoms.    Nisha continues to require the following intervention to meet STG and LTG's:   PT    Recommendations:  This patient would benefit from continued therapy.     Frequency:  1 X week, once daily  Duration:  for 4 weeks        Please refer to the daily flowsheet for treatment today, total treatment time and time spent performing 1:1 timed codes.

## 2019-08-23 ENCOUNTER — TELEPHONE (OUTPATIENT)
Dept: ORTHOPEDICS | Facility: CLINIC | Age: 64
End: 2019-08-23

## 2019-08-23 NOTE — TELEPHONE ENCOUNTER
Patient cancelled surgery scheduled in May 2019.  She would like to reschedule the left thumb carpometacarpal arthroplasty at the end of October.  Dr Salgado will be consulted concerning rescheduling the surgery without a return clinic visit.  Her last visit was 03/15/19.

## 2019-08-23 NOTE — TELEPHONE ENCOUNTER
[facility] Call Center    Phone Message    May a detailed message be left on voicemail: yes    Reason for Call: Other: Nisha is calling to speak with Basilia.  Nisha is ready to move forward with surgery that was cancelled from last spring.  Please call her to discuss what needs to happen to get this going again.       Action Taken: Message routed to:  [facility] ortho

## 2019-08-26 ENCOUNTER — TELEPHONE (OUTPATIENT)
Dept: ORTHOPEDICS | Facility: CLINIC | Age: 64
End: 2019-08-26

## 2019-08-26 NOTE — TELEPHONE ENCOUNTER
Patient is scheduled for surgery with Dr. Salgado     Spoke or left message with: patient via phone call     Date of Surgery: 11/1/19     [facility]     Informed patient they will need an adult  yes    Post-ops made 1 wk with provider     Pre-op with surgeon (if applicable): yes     H&P: Scheduled with PCP     Additional imaging/appointments: n/a     Surgery packet: given in clinic back in March     Additional comments: patient would like to have other hand done shortly after

## 2019-08-27 ENCOUNTER — OFFICE VISIT (OUTPATIENT)
Dept: ORTHOPEDICS | Facility: CLINIC | Age: 64
End: 2019-08-27
Payer: COMMERCIAL

## 2019-08-27 DIAGNOSIS — Z98.890 S/P KNEE SURGERY: Primary | ICD-10-CM

## 2019-08-27 NOTE — LETTER
8/27/2019       RE: Nisha Perez  [address] [place] [id number]     Dear Colleague,    Thank you for referring your patient, Nisha Perez, to [facility]. Please see a copy of my visit note below.    Patient is a 63-year old female who is now 5 months status post right total knee replacement, and 2 months status post a mini open lysis of    Preoperative range of motion was 0-1 50.  Before the manipulation 0-1 08 after the manipulation 0 140 gravity only 0-1 20    Her best effort with physical therapy has been 0-1 36, but this was after considerable stretching increased warmth and some exercise.    Overall she is pleased with her results today and feels that she can do all the things that she wants to do.  She will continue to work on strengthening as well as stretching.    Physical exam today's knee reveals benign appearing incisions good straight leg raising effort without a lag range of motion 0-1 24   Kneecap tracks well through an active arc of motion.    Plan doing well postop.  She had great motion before surgery and it is unlikely that she will achieve 150 degrees of motion.  However she is functional at this time will likely gain a few more degrees over time.    In the absence of problems follow-up with me on an as-needed basis or at the one year stephanie.   This is a postop visit    Monie Dai MD  Professor Orthopedic Surgery  [facility]

## 2019-08-27 NOTE — PROGRESS NOTES
Patient is a 63-year old female who is now 5 months status post right total knee replacement, and 2 months status post a mini open lysis of    Preoperative range of motion was 0-1 50.  Before the manipulation 0-1 08 after the manipulation 0 140 gravity only 0-1 20    Her best effort with physical therapy has been 0-1 36, but this was after considerable stretching increased warmth and some exercise.    Overall she is pleased with her results today and feels that she can do all the things that she wants to do.  She will continue to work on strengthening as well as stretching.    Physical exam today's knee reveals benign appearing incisions good straight leg raising effort without a lag range of motion 0-1 24   Kneecap tracks well through an active arc of motion.    Plan doing well postop.  She had great motion before surgery and it is unlikely that she will achieve 150 degrees of motion.  However she is functional at this time will likely gain a few more degrees over time.    In the absence of problems follow-up with me on an as-needed basis or at the one year stephanie.   This is a postop visit    Monie Dai MD  Professor Orthopedic Surgery  [facility]

## 2019-08-27 NOTE — NURSING NOTE
Reason For Visit:   Chief Complaint   Patient presents with     RECHECK     DOS 7/26/19 Right knee open lysis of adhesions       Primary MD: Emerson Spear    Date of surgery: 7/26/19  Type of surgery: Right knee open lysis of adhesions.  Smoker: No  Request smoking cessation information: No    There were no vitals taken for this visit.    Pain Assessment  Patient Currently in Pain: Yes  0-10 Pain Scale: 2  Primary Pain Location: Knee(Right)             Day Cota ATC

## 2019-08-28 ENCOUNTER — TELEPHONE (OUTPATIENT)
Dept: ORTHOPEDICS | Facility: CLINIC | Age: 64
End: 2019-08-28

## 2019-09-19 ENCOUNTER — TELEPHONE (OUTPATIENT)
Dept: GASTROENTEROLOGY | Facility: CLINIC | Age: 64
End: 2019-09-19

## 2019-09-19 NOTE — TELEPHONE ENCOUNTER
Writer spoke with the patient. She is having surgery in Oct and Dec, will call in to clinic to schedule in March or April 2020 to see Dr. Cox, for a 6 month visit.    Linda LEMUS [facility]  Adult Med Spec/Surg Spec   [phone]

## 2019-09-25 NOTE — PROGRESS NOTES
[address]  [place] [address]  [phone]  Dept: [phone]    PRE-OP EVALUATION:  Today's date: 10/1/2019    Nisha Perez (: 1955) presents for pre-operative evaluation assessment as requested by Dr. Salgado.  She requires evaluation and anesthesia risk assessment prior to undergoing surgery/procedure for treatment of Left Carpal Tunnel Arthroplasty .    Primary Physician: Emerson Spear  Type of Anesthesia Anticipated: to be determined    Patient has a Health Care Directive or Living Will:  YES on file    Preop Questions 10/1/2019   Who is doing your surgery? Dr Salgado   What are you having done? base of thumb   Date of Surgery/Procedure: 10 11 2019   [facility] or [facility]   1.  Do you have a history of Heart attack, stroke, stent, coronary bypass surgery, or other heart surgery? No   2.  Do you ever have any pain or discomfort in your chest? No   3.  Do you have a history of  Heart Failure? No   4.   Are you troubled by shortness of breath when:  walking on a level surface, or up a slight hill, or at night? No   5.  Do you currently have a cold, bronchitis or other respiratory infection? YES - cold - started on Saturday, nasal congestion, drainage, chest congestion, cough.  No fevers or chills, sore throat, ear pain, sinus pressure, chest pain, shortness of breath, wheezing, hemoptysis.     6.  Do you have a cough, shortness of breath, or wheezing? YES - yes, see above    7.  Do you sometimes get pains in the calves of your legs when you walk? No   8. Do you or anyone in your family have previous history of blood clots? No   9.  Do you or does anyone in your family have a serious bleeding problem such as prolonged bleeding following surgeries or cuts? No   10. Have you ever had problems with anemia or been told to take iron pills? No   11. Have you had any abnormal blood loss such as black, tarry  or bloody stools, or abnormal vaginal bleeding? No   12. Have you ever had a blood transfusion? No   13. Have you or any of your relatives ever had problems with anesthesia? No   14. Do you have sleep apnea, excessive snoring or daytime drowsiness? No   15. Do you have any prosthetic heart valves? No   16. Do you have prosthetic joints? YES - Knee bilateral   17. Is there any chance that you may be pregnant? No         HPI:     HPI related to upcoming procedure: Thumb CMC osteoarthritis and left carpal tunnel symptoms.       See problem list for active medical problems.  Problems all longstanding and stable, except as noted/documented.  See ROS for pertinent symptoms related to these conditions.      MEDICAL HISTORY:     Patient Active Problem List    Diagnosis Date Noted     Latex allergy 10/01/2019     Priority: Medium     Acute pain of right knee 07/29/2019     Priority: Medium     Aftercare following right knee joint replacement surgery 04/27/2019     Priority: Medium     Localized edema 04/27/2019     Priority: Medium     Status post knee surgery 03/28/2019     Priority: Medium     Phobia, flying 03/28/2017     Priority: Medium     Gastroesophageal reflux disease without esophagitis 03/28/2017     Priority: Medium     Advanced directives, counseling/discussion 11/13/2015     Priority: Medium     Patient has a health directive--copy to be obtained as able.  Roderick Hunter MD         Anxiety disorder      Priority: Medium     Subluxation of patella, acquired 08/04/2011     Priority: Medium     Aftercare following joint replacement 08/04/2011     Priority: Medium     Knee joint replacement by other means 08/04/2011     Priority: Medium     Abnormal gait 08/04/2011     Priority: Medium     iamJOINT DERANGEMENT NEC-L/LEG 12/05/2006     Priority: Medium      Past Medical History:   Diagnosis Date     Anxiety disorder      Gastro-oesophageal reflux disease     esophagitis     Injury of knee, left      Insomnia       Osteoarthritis      Osteoporosis      Past Surgical History:   Procedure Laterality Date     APPENDECTOMY       ARTHRODESIS ANKLE      left, staples      ARTHROPLASTY KNEE Right 3/28/2019    Procedure: Right Total Knee Arthroplasty;  Surgeon: Monie Lloyd MD;  [facility]     ARTHROPLASTY PATELLO-FEMORAL (KNEE)  2011    Procedure:ARTHROPLASTY PATELLO-FEMORAL (KNEE); Left Knee Latia Arthrolplasty Prosthesis, Removal of hardware left knee; Surgeon:MONIE LLOYD; [facility]     ARTHROSCOPY KNEE      left     ARTHROSCOPY KNEE      right, maureen      SECTION      X 4     COMBINED ESOPHAGOSCOPY, GASTROSCOPY, DUODENOSCOPY (EGD) WITH CO2 INSUFFLATION N/A 2019    Procedure: ESOPHAGOGASTRODUODENOSCOPY, WITH CO2 INSUFFLATION;  Surgeon: Juan M Cox MD;  Location: [facility]     ESOPHAGOSCOPY, GASTROSCOPY, DUODENOSCOPY (EGD), COMBINED N/A 2019    Procedure: Esophagogastroduodenoscopy, With Biopsy;  Surgeon: Juan M Cox MD;  Location: [facility]     LYSIS OF ADHESIONS KNEE Right 2019    Procedure: Right Knee Mini Open Lysis of Adhesions;  Surgeon: Monie Lloyd MD;  Location: [facility]     ORTHOPEDIC SURGERY      left knee partial knee replacement     TONSILLECTOMY       Current Outpatient Medications   Medication Sig Dispense Refill     acetaminophen (TYLENOL) 325 MG tablet Take 3 tablets (975 mg) by mouth every 8 hours 100 tablet 0     cetirizine (ZYRTEC) 5 MG tablet Take 5 mg by mouth daily       esomeprazole (NEXIUM) 40 MG DR capsule Take 40 mg by mouth every morning (before breakfast) Take 30-60 minutes before eating.       fluticasone (FLONASE) 50 MCG/ACT nasal spray Spray 1 spray into both nostrils 2 times daily       ibuprofen (ADVIL/MOTRIN) 100 MG tablet Take 100 mg by mouth every 4 hours as needed       Pseudoephedrine HCl (SUDAFED PO) Take  by mouth at onset of headache.       zolpidem ER (AMBIEN CR) 6.25 MG CR tablet Take 6.25 mg by mouth  "nightly as needed for sleep       OTC products: None, except as noted above    Allergies   Allergen Reactions     Latex Itching     burning     Latex      PN: Converted from LW Latex Sensitivity Flag      Latex Allergy: YES: Precautions to take: NO LATEX    Social History     Tobacco Use     Smoking status: Never Smoker     Smokeless tobacco: Never Used   Substance Use Topics     Alcohol use: Yes     Comment: 4-6 drinks per week     History   Drug Use No       REVIEW OF SYSTEMS:   Constitutional, neuro, ENT, endocrine, pulmonary, cardiac, gastrointestinal, genitourinary, musculoskeletal, integument and psychiatric systems are negative, except as otherwise noted.    EXAM:   /84   Pulse 72   Temp 98.5  F (36.9  C)   Resp 16   Ht 1.448 m (4' 9\")   Wt 57.7 kg (127 lb 3.2 oz)   SpO2 100%   BMI 27.53 kg/m      GENERAL APPEARANCE: healthy, alert and no distress     EYES: EOMI, PERRL     HENT: ear canals and TM's normal and nose and mouth without ulcers or lesions     NECK: no adenopathy, no asymmetry, masses, or scars and thyroid normal to palpation     RESP: lungs clear to auscultation - no rales, rhonchi or wheezes     CV: regular rates and rhythm, normal S1 S2, no S3 or S4 and no murmur, click or rub     ABDOMEN:  soft, nontender, no HSM or masses and bowel sounds normal     MS: extremities normal- no gross deformities noted, no evidence of inflammation in joints, FROM in all extremities.     SKIN: no suspicious lesions or rashes     NEURO: Normal strength and tone, sensory exam grossly normal, mentation intact and speech normal     PSYCH: mentation appears normal. and affect normal/bright     LYMPHATICS: No cervical adenopathy    DIAGNOSTICS:   EKG: appears normal, NSR, normal axis, normal intervals, no acute ST/T changes c/w ischemia, no LVH by voltage criteria, Unchanged from previous EKG    Recent Labs   Lab Test 07/19/19  1149 03/30/19  0631  03/11/19  1043 07/22/11  0547   HGB 13.7 9.7*   < > 14.1 " 12.9   PLT  --   --   --  245  --    NA  --   --   --  139  --    POTASSIUM  --   --   --  4.0 4.4   CR  --   --   --  0.69  --     < > = values in this interval not displayed.      Component      Latest Ref Rng & Units 10/1/2019   Hemoglobin      11.7 - 15.7 g/dL 13.8     IMPRESSION:   Reason for surgery/procedure: Left CMC osteoarthritis and carpal tunnel syndrome    The proposed surgical procedure is considered INTERMEDIATE risk.    REVISED CARDIAC RISK INDEX  The patient has the following serious cardiovascular risks for perioperative complications such as (MI, PE, VFib and 3  AV Block):  No serious cardiac risks  INTERPRETATION: 0 risks: Class I (very low risk - 0.4% complication rate)    The patient has the following additional risks for perioperative complications:  No identified additional risks      ICD-10-CM    1. Preop general physical exam Z01.818    2. Carpal tunnel syndrome of left wrist G56.02 Hemoglobin   3. Primary osteoarthritis of first carpometacarpal joint of left hand M18.12 Hemoglobin   4. Benign essential hypertension I10 EKG 12-lead complete w/read - Clinics   5. Latex allergy Z91.040    6. Viral URI with cough J06.9     B97.89        RECOMMENDATIONS:     --Will hold all medications until after procedure.   --Currently has Viral URI, still approved for procedure.  She will notify us if she is not improving or worse and then would recommend antibiotics prior to procedure.     Anticoagulant or Antiplatelet Medication Use  Anti-inflammatories: hold 7 days prior.         APPROVAL GIVEN to proceed with proposed procedure, without further diagnostic evaluation       Signed Electronically by: Yo Villegas PA-C    Copy of this evaluation report is provided to requesting physician.    [facility] Preop Guidelines    Revised Cardiac Risk Index

## 2019-09-28 ENCOUNTER — HEALTH MAINTENANCE LETTER (OUTPATIENT)
Age: 64
End: 2019-09-28

## 2019-10-01 ENCOUNTER — OFFICE VISIT (OUTPATIENT)
Dept: FAMILY MEDICINE | Facility: OTHER | Age: 64
End: 2019-10-01
Payer: COMMERCIAL

## 2019-10-01 VITALS
SYSTOLIC BLOOD PRESSURE: 128 MMHG | BODY MASS INDEX: 27.44 KG/M2 | WEIGHT: 127.2 LBS | RESPIRATION RATE: 16 BRPM | TEMPERATURE: 98.5 F | DIASTOLIC BLOOD PRESSURE: 84 MMHG | HEART RATE: 72 BPM | OXYGEN SATURATION: 100 % | HEIGHT: 57 IN

## 2019-10-01 DIAGNOSIS — I10 BENIGN ESSENTIAL HYPERTENSION: ICD-10-CM

## 2019-10-01 DIAGNOSIS — M18.12 PRIMARY OSTEOARTHRITIS OF FIRST CARPOMETACARPAL JOINT OF LEFT HAND: ICD-10-CM

## 2019-10-01 DIAGNOSIS — G56.02 CARPAL TUNNEL SYNDROME OF LEFT WRIST: ICD-10-CM

## 2019-10-01 DIAGNOSIS — Z01.818 PREOP GENERAL PHYSICAL EXAM: Primary | ICD-10-CM

## 2019-10-01 DIAGNOSIS — J06.9 VIRAL URI WITH COUGH: ICD-10-CM

## 2019-10-01 DIAGNOSIS — Z91.040 LATEX ALLERGY: ICD-10-CM

## 2019-10-01 LAB — HGB BLD-MCNC: 13.8 G/DL (ref 11.7–15.7)

## 2019-10-01 PROCEDURE — 85018 HEMOGLOBIN: CPT | Performed by: PHYSICIAN ASSISTANT

## 2019-10-01 PROCEDURE — 36415 COLL VENOUS BLD VENIPUNCTURE: CPT | Performed by: PHYSICIAN ASSISTANT

## 2019-10-01 PROCEDURE — 93000 ELECTROCARDIOGRAM COMPLETE: CPT | Performed by: PHYSICIAN ASSISTANT

## 2019-10-01 PROCEDURE — 99214 OFFICE O/P EST MOD 30 MIN: CPT | Performed by: PHYSICIAN ASSISTANT

## 2019-10-01 ASSESSMENT — PAIN SCALES - GENERAL: PAINLEVEL: NO PAIN (0)

## 2019-10-01 ASSESSMENT — MIFFLIN-ST. JEOR: SCORE: 1000.86

## 2019-10-04 ENCOUNTER — TELEPHONE (OUTPATIENT)
Dept: FAMILY MEDICINE | Facility: OTHER | Age: 64
End: 2019-10-04

## 2019-10-04 DIAGNOSIS — J01.90 ACUTE NON-RECURRENT SINUSITIS, UNSPECIFIED LOCATION: Primary | ICD-10-CM

## 2019-10-04 RX ORDER — DOXYCYCLINE 100 MG/1
100 CAPSULE ORAL 2 TIMES DAILY
Qty: 20 CAPSULE | Refills: 0 | Status: SHIPPED | OUTPATIENT
Start: 2019-10-04 | End: 2019-12-19

## 2019-10-04 NOTE — TELEPHONE ENCOUNTER
Continue with OTC therapies discussed at visit.   Start Doxycycline 2 times per day.   Can hold morning of procedure until eating again.   If not improving or at any point develops fevers she should be seen.     Yo Villegas PA-C

## 2019-10-04 NOTE — TELEPHONE ENCOUNTER
10/1/19 visit with ES  Will hold all medications until after procedure.   --Currently has Viral URI, still approved for procedure.  She will notify us if she is not improving or worse and then would recommend antibiotics prior to procedure.

## 2019-10-04 NOTE — TELEPHONE ENCOUNTER
Reason for Call:  Medication or medication refill:    Do you use a [facility]?  Name of the pharmacy and phone number for the current request:  [facility] - [phone]    Name of the medication requested: antibiotics    Other request: Patient still has cold sx and will need antibiotics for before surgery. Patient was told to call if not better.    Can we leave a detailed message on this number? YES    Phone number patient can be reached at: Home number on file [phone] (home)    Best Time: any    Call taken on 10/4/2019 at 10:04 AM by Falguni Cordova

## 2019-10-07 PROBLEM — M25.561 ACUTE PAIN OF RIGHT KNEE: Status: RESOLVED | Noted: 2019-07-29 | Resolved: 2019-10-07

## 2019-10-07 NOTE — PROGRESS NOTES
Discharge Note    Progress reporting period is from initial evaluation date (please see noted date below) to Aug 20, 2019.  Linked Episodes   Type: Episode: Status: Noted: Resolved: Last update: Updated by:   PHYSICAL THERAPY Knee pain, R, s/p GREG, s/p partial TKA 7/29/19 Active 7/29/2019 8/20/2019 12:09 PM Alan Rod, PT      Comments:       Nisha failed to follow up and current status is unknown.  Please see information below for last relevant information on current status.  Patient seen for 5 visits.    SUBJECTIVE  Subjective changes noted by patient:  Pt reports the knee feels stiff, pain is mild at 2-3/10 level, States she continues to take Advil every day for the pain. She reports no difficulty with ADL's or home activities yard work. States she had soreness following several hours of yard work or house work. Pain rating at 2-3/10 overall.   .  Current pain level is (2-3/10).     Previous pain level was  3/10.   Changes in function:  Yes (See Goal flowsheet attached for changes in current functional level)  Adverse reaction to treatment or activity: None    OBJECTIVE  Changes noted in objective findings: Gait without deficits. AROM 0-125, PROM 0-136 degrees. Patellar mobility remains 80% full superior and inferior, full medial and lateral. Tenderness remains in lateral hamstrings. Non tender over scars.      ASSESSMENT/PLAN  Diagnosis: s/p GREG R knee    Updated problem list and treatment plan:   Pain - HEP  Decreased function - HEP  STG/LTGs have been met or progress has been made towards goals:  Yes, please see goal flowsheet for most current information  Assessment of Progress: current status is unknown.    Last current status: Pt is progressing as expected   Self Management Plans:  HEP  I have re-evaluated this patient and find that the nature, scope, duration and intensity of the therapy is appropriate for the medical condition of the patient.  Nisha continues to require the following intervention to  meet STG and LTG's:  HEP.    Recommendations:  Discharge with current home program.  Patient to follow up with MD as needed.    Please refer to the daily flowsheet for treatment today, total treatment time and time spent performing 1:1 timed codes.

## 2019-10-08 PROBLEM — I10 BENIGN ESSENTIAL HYPERTENSION: Status: ACTIVE | Noted: 2019-10-08

## 2019-10-08 PROBLEM — I10 BENIGN ESSENTIAL HYPERTENSION: Status: RESOLVED | Noted: 2019-10-08 | Resolved: 2019-10-08

## 2019-10-09 ENCOUNTER — TELEPHONE (OUTPATIENT)
Dept: FAMILY MEDICINE | Facility: OTHER | Age: 64
End: 2019-10-09

## 2019-10-09 NOTE — TELEPHONE ENCOUNTER
Patient informed.   She is not worse and does not have new symptoms, but she does not feel better.     Next 5 appointments (look out 90 days)    Oct 10, 2019 11:30 AM CDT  SHORT with Emerson Spear PA-C  [facility] ([facility]) [address] [place] [id number]  [phone]        Pamella Win, RN, BSN

## 2019-10-09 NOTE — TELEPHONE ENCOUNTER
If her symptoms are not getting worse or new symptoms developing especially a fever she is ok to resume with surgery, if she is concerned she should be seen tomorrow for re-evaluation, unfortunately would have to be with another provider as I will be out of clinic tomorrow.     Yo Villegas PA-C

## 2019-10-09 NOTE — TELEPHONE ENCOUNTER
Reason for call:  Patient reporting a symptom    Symptom or request: Cough    Duration (how long have symptoms been present): Ongoing    Have you been treated for this before? Yes    Additional comments: Patient has surgery on Friday, 10/11/2018. She was given antibiotics for congestion but still has a cough/mucous. She doesn't have a fever but is wondering what to do because of the surgery    Phone Number patient can be reached at:  Cell number on file:    Telephone Information:   Mobile [phone]       Best Time:  any    Can we leave a detailed message on this number:  YES    Call taken on 10/9/2019 at 3:31 PM by Genny Matthew

## 2019-10-10 ENCOUNTER — OFFICE VISIT (OUTPATIENT)
Dept: FAMILY MEDICINE | Facility: OTHER | Age: 64
End: 2019-10-10
Payer: COMMERCIAL

## 2019-10-10 ENCOUNTER — ANESTHESIA EVENT (OUTPATIENT)
Dept: SURGERY | Facility: AMBULATORY SURGERY CENTER | Age: 64
End: 2019-10-10

## 2019-10-10 ENCOUNTER — ANCILLARY PROCEDURE (OUTPATIENT)
Dept: GENERAL RADIOLOGY | Facility: OTHER | Age: 64
End: 2019-10-10
Attending: PHYSICIAN ASSISTANT
Payer: COMMERCIAL

## 2019-10-10 VITALS
SYSTOLIC BLOOD PRESSURE: 128 MMHG | HEART RATE: 78 BPM | RESPIRATION RATE: 20 BRPM | TEMPERATURE: 98.8 F | BODY MASS INDEX: 28.09 KG/M2 | DIASTOLIC BLOOD PRESSURE: 70 MMHG | OXYGEN SATURATION: 98 % | WEIGHT: 129.8 LBS

## 2019-10-10 DIAGNOSIS — J06.9 VIRAL URI WITH COUGH: Primary | ICD-10-CM

## 2019-10-10 DIAGNOSIS — R05.3 PERSISTENT COUGH: ICD-10-CM

## 2019-10-10 PROCEDURE — 99213 OFFICE O/P EST LOW 20 MIN: CPT | Performed by: PHYSICIAN ASSISTANT

## 2019-10-10 PROCEDURE — 71046 X-RAY EXAM CHEST 2 VIEWS: CPT

## 2019-10-10 NOTE — Clinical Note
Nisha Holland has surgery with you tomorrow. She has been battling a cold for the past few weeks and has been on doxycycline for 1 week with some improvement in her symptoms. She still has a productive cough but had a normal exam and normal chest x-ray today so she remains cleared for surgery as there is no evidence for bacterial infection. Thanks.Emerson Spear PA-C

## 2019-10-10 NOTE — PATIENT INSTRUCTIONS
Symptoms consistent with viral sinusitis.  Chest x-ray is normal without signs of pneumonia.  Finish course of doxycycline and continue with current treatments including Flonase, antihistamine, Mucinex and nasal rinses.  Stay well hydrated.  You can try Sudafed again but I would wait until after surgery.  You are cleared for surgery but if you have any new fevers or worsening symptoms, you should be seen again.

## 2019-10-10 NOTE — PROGRESS NOTES
Subjective     Nisha Perez is a 64 year old female who presents to clinic today for the following health issues:    HPI   Acute Illness- Ongoing Cough    Acute illness concerns:   Onset: 2 weeks     Fever: no    Chills/Sweats: no    Headache (location?): YES    Sinus Pressure:YES    Conjunctivitis:  no    Ear Pain: no    Rhinorrhea: YES    Congestion: YES    Sore Throat: no      Cough: YES    Wheeze: no     Decreased Appetite: no     Nausea: no    Vomiting: no    Diarrhea:  no    Dysuria/Freq.: no    Fatigue/Achiness: YES    Sick/Strep Exposure: no     Therapies Tried and outcome: Currently on ABX, also taking OTC decongestants.     She has had cold symptoms for the past 2 weeks with a productive cough, nasal/sinus congestion and postnasal drainage. She was started on doxycycline by Yo Villegas PA-C on 10/4 and her symptoms have slightly improved but she still has a productive cough and persistent, clear postnasal drainage. She has been using Mucinex, Flonase and saline nasal sprays without lasting benefit. She tried Sudafed for a few days but stopped this as she read it could actually worsen congestion because it dries things up causing the body to make more mucous. She denies fevers or chills. She has hand surgery tomorrow so wants to make sure she is cleared for this.     Reviewed and updated as needed this visit by Provider  Allergies  Meds  Problems  Med Hx     Review of Systems   ROS COMP: Constitutional, HEENT, cardiovascular, pulmonary, gi and gu systems are negative, except as otherwise noted.      Objective    /70 (BP Location: Right arm, Patient Position: Chair, Cuff Size: Adult Regular)   Pulse 78   Temp 98.8  F (37.1  C) (Temporal)   Resp 20   Wt 58.9 kg (129 lb 12.8 oz)   SpO2 98%   Breastfeeding? No   BMI 28.09 kg/m    Body mass index is 28.09 kg/m .  Physical Exam   GENERAL: healthy, alert and no distress  EYES: Eyes grossly normal to inspection, PERRL and conjunctivae and  sclerae normal  HENT: ear canals and TM's normal. Nasal mucosa is mildly erythematous. Pharynx is clear.   NECK: no adenopathy, no asymmetry, masses, or scars and thyroid normal to palpation  RESP: lungs clear to auscultation - no rales, rhonchi or wheezes  CV: regular rate and rhythm, normal S1 S2, no S3 or S4, no murmur, click or rub        CXR    Normal chest x-ray without infiltrates. Discussed with radiology.     Assessment & Plan       ICD-10-CM    1. Viral URI with cough J06.9 XR Chest 2 Views    B97.89         Symptoms consistent with viral URI with cough.   Chest x-ray ordered given continued cough with surgery tomorrow is normal without signs of pneumonia.  I recommend she finish course of doxycycline and continue with current treatments including Flonase, antihistamine, Mucinex and nasal rinses.  Encouraged to stay well hydrated.  Can try Sudafed again but I would wait until after surgery.  She remains cleared for surgery but if she has any new fevers or worsening symptoms, she should be seen again.     Emerson Spear PA-C  [facility]

## 2019-10-11 ENCOUNTER — HOSPITAL ENCOUNTER (OUTPATIENT)
Facility: AMBULATORY SURGERY CENTER | Age: 64
End: 2019-10-11
Attending: ORTHOPAEDIC SURGERY
Payer: COMMERCIAL

## 2019-10-11 ENCOUNTER — ANESTHESIA (OUTPATIENT)
Dept: SURGERY | Facility: AMBULATORY SURGERY CENTER | Age: 64
End: 2019-10-11

## 2019-10-11 VITALS
SYSTOLIC BLOOD PRESSURE: 143 MMHG | DIASTOLIC BLOOD PRESSURE: 93 MMHG | OXYGEN SATURATION: 93 % | HEART RATE: 78 BPM | TEMPERATURE: 98.1 F | RESPIRATION RATE: 17 BRPM

## 2019-10-11 DIAGNOSIS — M18.12 ARTHRITIS OF CARPOMETACARPAL (CMC) JOINT OF LEFT THUMB: Primary | ICD-10-CM

## 2019-10-11 DEVICE — IMPLANTABLE DEVICE: Type: IMPLANTABLE DEVICE | Site: HAND | Status: FUNCTIONAL

## 2019-10-11 RX ORDER — ACETAMINOPHEN 325 MG/1
975 TABLET ORAL ONCE
Status: COMPLETED | OUTPATIENT
Start: 2019-10-11 | End: 2019-10-11

## 2019-10-11 RX ORDER — CEFAZOLIN SODIUM 2 G/50ML
2 SOLUTION INTRAVENOUS
Status: COMPLETED | OUTPATIENT
Start: 2019-10-11 | End: 2019-10-11

## 2019-10-11 RX ORDER — LIDOCAINE HYDROCHLORIDE 20 MG/ML
INJECTION, SOLUTION INFILTRATION; PERINEURAL PRN
Status: DISCONTINUED | OUTPATIENT
Start: 2019-10-11 | End: 2019-10-11

## 2019-10-11 RX ORDER — GABAPENTIN 300 MG/1
300 CAPSULE ORAL ONCE
Status: COMPLETED | OUTPATIENT
Start: 2019-10-11 | End: 2019-10-11

## 2019-10-11 RX ORDER — PROPOFOL 10 MG/ML
INJECTION, EMULSION INTRAVENOUS PRN
Status: DISCONTINUED | OUTPATIENT
Start: 2019-10-11 | End: 2019-10-11

## 2019-10-11 RX ORDER — LIDOCAINE HYDROCHLORIDE AND EPINEPHRINE 10; 10 MG/ML; UG/ML
INJECTION, SOLUTION INFILTRATION; PERINEURAL PRN
Status: DISCONTINUED | OUTPATIENT
Start: 2019-10-11 | End: 2019-10-11 | Stop reason: HOSPADM

## 2019-10-11 RX ORDER — ONDANSETRON 2 MG/ML
4 INJECTION INTRAMUSCULAR; INTRAVENOUS EVERY 30 MIN PRN
Status: DISCONTINUED | OUTPATIENT
Start: 2019-10-11 | End: 2019-10-12 | Stop reason: HOSPADM

## 2019-10-11 RX ORDER — SODIUM CHLORIDE, SODIUM LACTATE, POTASSIUM CHLORIDE, CALCIUM CHLORIDE 600; 310; 30; 20 MG/100ML; MG/100ML; MG/100ML; MG/100ML
INJECTION, SOLUTION INTRAVENOUS CONTINUOUS
Status: DISCONTINUED | OUTPATIENT
Start: 2019-10-11 | End: 2019-10-11 | Stop reason: HOSPADM

## 2019-10-11 RX ORDER — FLUMAZENIL 0.1 MG/ML
0.2 INJECTION, SOLUTION INTRAVENOUS
Status: DISCONTINUED | OUTPATIENT
Start: 2019-10-11 | End: 2019-10-11 | Stop reason: HOSPADM

## 2019-10-11 RX ORDER — BUPIVACAINE HYDROCHLORIDE 5 MG/ML
INJECTION, SOLUTION EPIDURAL; INTRACAUDAL PRN
Status: DISCONTINUED | OUTPATIENT
Start: 2019-10-11 | End: 2019-10-11

## 2019-10-11 RX ORDER — LIDOCAINE 40 MG/G
CREAM TOPICAL
Status: DISCONTINUED | OUTPATIENT
Start: 2019-10-11 | End: 2019-10-11 | Stop reason: HOSPADM

## 2019-10-11 RX ORDER — DEXAMETHASONE SODIUM PHOSPHATE 4 MG/ML
INJECTION, SOLUTION INTRA-ARTICULAR; INTRALESIONAL; INTRAMUSCULAR; INTRAVENOUS; SOFT TISSUE PRN
Status: DISCONTINUED | OUTPATIENT
Start: 2019-10-11 | End: 2019-10-11

## 2019-10-11 RX ORDER — CEFAZOLIN SODIUM 1 G/50ML
1 SOLUTION INTRAVENOUS SEE ADMIN INSTRUCTIONS
Status: DISCONTINUED | OUTPATIENT
Start: 2019-10-11 | End: 2019-10-11 | Stop reason: HOSPADM

## 2019-10-11 RX ORDER — NALOXONE HYDROCHLORIDE 0.4 MG/ML
.1-.4 INJECTION, SOLUTION INTRAMUSCULAR; INTRAVENOUS; SUBCUTANEOUS
Status: DISCONTINUED | OUTPATIENT
Start: 2019-10-11 | End: 2019-10-11 | Stop reason: HOSPADM

## 2019-10-11 RX ORDER — FENTANYL CITRATE 50 UG/ML
25-50 INJECTION, SOLUTION INTRAMUSCULAR; INTRAVENOUS EVERY 5 MIN PRN
Status: DISCONTINUED | OUTPATIENT
Start: 2019-10-11 | End: 2019-10-11 | Stop reason: HOSPADM

## 2019-10-11 RX ORDER — GLYCOPYRROLATE 0.2 MG/ML
INJECTION, SOLUTION INTRAMUSCULAR; INTRAVENOUS PRN
Status: DISCONTINUED | OUTPATIENT
Start: 2019-10-11 | End: 2019-10-11

## 2019-10-11 RX ORDER — FENTANYL CITRATE 50 UG/ML
25-50 INJECTION, SOLUTION INTRAMUSCULAR; INTRAVENOUS
Status: DISCONTINUED | OUTPATIENT
Start: 2019-10-11 | End: 2019-10-11 | Stop reason: HOSPADM

## 2019-10-11 RX ORDER — SODIUM CHLORIDE, SODIUM LACTATE, POTASSIUM CHLORIDE, CALCIUM CHLORIDE 600; 310; 30; 20 MG/100ML; MG/100ML; MG/100ML; MG/100ML
INJECTION, SOLUTION INTRAVENOUS CONTINUOUS
Status: DISCONTINUED | OUTPATIENT
Start: 2019-10-11 | End: 2019-10-12 | Stop reason: HOSPADM

## 2019-10-11 RX ORDER — HYDROCODONE BITARTRATE AND ACETAMINOPHEN 5; 325 MG/1; MG/1
1-2 TABLET ORAL EVERY 4 HOURS PRN
Qty: 20 TABLET | Refills: 0 | Status: SHIPPED | OUTPATIENT
Start: 2019-10-11 | End: 2019-12-19

## 2019-10-11 RX ORDER — ONDANSETRON 2 MG/ML
INJECTION INTRAMUSCULAR; INTRAVENOUS PRN
Status: DISCONTINUED | OUTPATIENT
Start: 2019-10-11 | End: 2019-10-11

## 2019-10-11 RX ORDER — MEPERIDINE HYDROCHLORIDE 25 MG/ML
12.5 INJECTION INTRAMUSCULAR; INTRAVENOUS; SUBCUTANEOUS
Status: DISCONTINUED | OUTPATIENT
Start: 2019-10-11 | End: 2019-10-12 | Stop reason: HOSPADM

## 2019-10-11 RX ORDER — KETAMINE HYDROCHLORIDE 10 MG/ML
INJECTION, SOLUTION INTRAMUSCULAR; INTRAVENOUS PRN
Status: DISCONTINUED | OUTPATIENT
Start: 2019-10-11 | End: 2019-10-11

## 2019-10-11 RX ORDER — OXYCODONE HYDROCHLORIDE 5 MG/1
5 TABLET ORAL EVERY 4 HOURS PRN
Status: DISCONTINUED | OUTPATIENT
Start: 2019-10-11 | End: 2019-10-12 | Stop reason: HOSPADM

## 2019-10-11 RX ORDER — BACITRACIN ZINC 500 [USP'U]/G
OINTMENT TOPICAL PRN
Status: DISCONTINUED | OUTPATIENT
Start: 2019-10-11 | End: 2019-10-11 | Stop reason: HOSPADM

## 2019-10-11 RX ORDER — PROPOFOL 10 MG/ML
INJECTION, EMULSION INTRAVENOUS CONTINUOUS PRN
Status: DISCONTINUED | OUTPATIENT
Start: 2019-10-11 | End: 2019-10-11

## 2019-10-11 RX ORDER — ONDANSETRON 4 MG/1
4 TABLET, ORALLY DISINTEGRATING ORAL EVERY 30 MIN PRN
Status: DISCONTINUED | OUTPATIENT
Start: 2019-10-11 | End: 2019-10-12 | Stop reason: HOSPADM

## 2019-10-11 RX ORDER — NALOXONE HYDROCHLORIDE 0.4 MG/ML
.1-.4 INJECTION, SOLUTION INTRAMUSCULAR; INTRAVENOUS; SUBCUTANEOUS
Status: DISCONTINUED | OUTPATIENT
Start: 2019-10-11 | End: 2019-10-12 | Stop reason: HOSPADM

## 2019-10-11 RX ORDER — HYDROCODONE BITARTRATE AND ACETAMINOPHEN 5; 325 MG/1; MG/1
1 TABLET ORAL
Status: DISCONTINUED | OUTPATIENT
Start: 2019-10-11 | End: 2019-10-12 | Stop reason: HOSPADM

## 2019-10-11 RX ADMIN — GABAPENTIN 300 MG: 300 CAPSULE ORAL at 12:33

## 2019-10-11 RX ADMIN — GLYCOPYRROLATE 0.2 MG: 0.2 INJECTION, SOLUTION INTRAMUSCULAR; INTRAVENOUS at 13:45

## 2019-10-11 RX ADMIN — OXYCODONE HYDROCHLORIDE 5 MG: 5 TABLET ORAL at 15:09

## 2019-10-11 RX ADMIN — ACETAMINOPHEN 975 MG: 325 TABLET ORAL at 12:33

## 2019-10-11 RX ADMIN — SODIUM CHLORIDE, SODIUM LACTATE, POTASSIUM CHLORIDE, CALCIUM CHLORIDE: 600; 310; 30; 20 INJECTION, SOLUTION INTRAVENOUS at 12:33

## 2019-10-11 RX ADMIN — DEXAMETHASONE SODIUM PHOSPHATE 4 MG: 4 INJECTION, SOLUTION INTRA-ARTICULAR; INTRALESIONAL; INTRAMUSCULAR; INTRAVENOUS; SOFT TISSUE at 13:31

## 2019-10-11 RX ADMIN — BUPIVACAINE HYDROCHLORIDE 30 ML: 5 INJECTION, SOLUTION EPIDURAL; INTRACAUDAL at 13:07

## 2019-10-11 RX ADMIN — ONDANSETRON 4 MG: 2 INJECTION INTRAMUSCULAR; INTRAVENOUS at 13:31

## 2019-10-11 RX ADMIN — PROPOFOL 150 MCG/KG/MIN: 10 INJECTION, EMULSION INTRAVENOUS at 13:31

## 2019-10-11 RX ADMIN — PROPOFOL 50 MG: 10 INJECTION, EMULSION INTRAVENOUS at 13:31

## 2019-10-11 RX ADMIN — CEFAZOLIN SODIUM 2 G: 2 SOLUTION INTRAVENOUS at 13:39

## 2019-10-11 RX ADMIN — LIDOCAINE HYDROCHLORIDE 60 MG: 20 INJECTION, SOLUTION INFILTRATION; PERINEURAL at 13:30

## 2019-10-11 RX ADMIN — KETAMINE HYDROCHLORIDE 20 MG: 10 INJECTION, SOLUTION INTRAMUSCULAR; INTRAVENOUS at 13:45

## 2019-10-11 NOTE — ANESTHESIA PROCEDURE NOTES
Peripheral Nerve Block Procedure Note    Staff:     Anesthesiologist:  Miguel Sullivan DO    Resident/CRNA:  Mehran Valdovinos MD    Block performed by resident/CRNA in the presence of a teaching physician    Location: Pre-op  Procedure Start/Stop TImes:      10/11/2019 1:02 PM     10/11/2019 1:07 PM    patient identified, IV checked, site marked, risks and benefits discussed, informed consent, monitors and equipment checked, pre-op evaluation, at physician/surgeon's request and post-op pain management      Correct Patient: Yes      Correct Position: Yes      Correct Site: Yes      Correct Procedure: Yes      Correct Laterality:  Yes    Site Marked:  Yes  Procedure details:     Procedure:  Supraclavicular    ASA:  2    Laterality:  Left    Position:  Supine    Sterile Prep: chloraprep, mask and sterile gloves      Needle:  Short bevel    Needle gauge:  21    Needle length (mm):  110    Ultrasound: Yes      Ultrasound used to identify targeted nerve, plexus, or vascular structure and placed a needle adjacent to it      Permanent Image entered into patiient's record      Abnormal pain on injection: No      Blood Aspirated: No      Paresthesias:  No    Bleeding at site: No      Bolus via:  Needle    Infusion Method:  Single Shot    Complications:  None  Assessment/Narrative:     Injection made incrementally with aspirations every (mL):  5

## 2019-10-11 NOTE — ANESTHESIA PREPROCEDURE EVALUATION
Anesthesia Pre-Procedure Evaluation    Patient: Nisha Perez   MRN:     [id number] Gender:   female   Age:    64 year old :      1955        Preoperative Diagnosis: Left Carpal Tunnel Syndrome, Left Carpal Metacarpal Osteoarthritis   Procedure(s):  Left Thumb Carpal Metacarpal Arthroplasty  Left Carpal Tunnel Release     Past Medical History:   Diagnosis Date     Anxiety disorder      Benign essential hypertension 10/8/2019     Gastro-oesophageal reflux disease     esophagitis     Injury of knee, left      Insomnia      Osteoarthritis      Osteoporosis       Past Surgical History:   Procedure Laterality Date     APPENDECTOMY       ARTHRODESIS ANKLE      left, staples      ARTHROPLASTY KNEE Right 3/28/2019    Procedure: Right Total Knee Arthroplasty;  Surgeon: Monie Lloyd MD;  Location: [facility]     ARTHROPLASTY PATELLO-FEMORAL (KNEE)  2011    Procedure:ARTHROPLASTY PATELLO-FEMORAL (KNEE); Left Knee Latia Arthrolplasty Prosthesis, Removal of hardware left knee; Surgeon:MONIE LLOYD; Location:[facility]     ARTHROSCOPY KNEE      left     ARTHROSCOPY KNEE      right, maureen      SECTION      X 4     COMBINED ESOPHAGOSCOPY, GASTROSCOPY, DUODENOSCOPY (EGD) WITH CO2 INSUFFLATION N/A 2019    Procedure: ESOPHAGOGASTRODUODENOSCOPY, WITH CO2 INSUFFLATION;  Surgeon: Juan M Cox MD;  Location: [facility] OR     ESOPHAGOSCOPY, GASTROSCOPY, DUODENOSCOPY (EGD), COMBINED N/A 2019    Procedure: Esophagogastroduodenoscopy, With Biopsy;  Surgeon: Juan M Cox MD;  Location: [facility] OR     LYSIS OF ADHESIONS KNEE Right 2019    Procedure: Right Knee Mini Open Lysis of Adhesions;  Surgeon: Monie Lloyd MD;  Location:  OR     ORTHOPEDIC SURGERY      left knee partial knee replacement     TONSILLECTOMY            Anesthesia Evaluation     . Pt has had prior anesthetic. Type: General, MAC and Regional           ROS/MED HX    ENT/Pulmonary:  - neg pulmonary ROS   (+),  recent URI . .    Neurologic:  - neg neurologic ROS     Cardiovascular:  - neg cardiovascular ROS   (+) ----. : . . . :. . Previous cardiac testing date:results:date: results:ECG reviewed date: results:Sinus Rhythm   - Negative precordial T-waves.     WITHIN NORMAL LIMITS date: results:          METS/Exercise Tolerance:  >4 METS   Hematologic:  - neg hematologic  ROS       Musculoskeletal:   (+) arthritis,  -       GI/Hepatic:     (+) GERD Asymptomatic on medication,       Renal/Genitourinary:         Endo:  - neg endo ROS       Psychiatric:     (+) psychiatric history anxiety      Infectious Disease:  - neg infectious disease ROS       Malignancy:      - no malignancy   Other:    - neg other ROS                     PHYSICAL EXAM:   Mental Status/Neuro: A/A/O   Airway: Facies: Feasible  Mallampati: I  Mouth/Opening: Full  TM distance: > 6 cm  Neck ROM: Full   Respiratory: Auscultation: CTAB     Resp. Rate: Normal     Resp. Effort: Normal      CV: Rhythm: Regular  Rate: Age appropriate  Heart: Normal Sounds  Edema: None   Comments:      Dental: Normal Dentition                LABS:  CBC:   Lab Results   Component Value Date    WBC 4.5 03/11/2019    HGB 13.8 10/01/2019    HGB 13.7 07/19/2019    HCT 44.4 03/11/2019     03/11/2019     BMP:   Lab Results   Component Value Date     03/11/2019    POTASSIUM 4.0 03/11/2019    POTASSIUM 4.4 07/22/2011    CHLORIDE 106 03/11/2019    CO2 27 03/11/2019    BUN 11 03/11/2019    CR 0.69 03/11/2019     (H) 03/28/2019    GLC 98 03/11/2019     COAGS: No results found for: PTT, INR, FIBR  POC:   Lab Results   Component Value Date    BGM 79 07/22/2011     OTHER:   Lab Results   Component Value Date    LEEANN 9.0 03/11/2019    TSH 1.43 06/19/2018        Preop Vitals    BP Readings from Last 3 Encounters:   10/10/19 128/70   10/01/19 128/84   07/26/19 146/79    Pulse Readings from Last 3 Encounters:   10/10/19 78   10/01/19 72   07/26/19 73      Resp Readings from Last 3  "Encounters:   10/10/19 20   10/01/19 16   07/26/19 18    SpO2 Readings from Last 3 Encounters:   10/10/19 98%   10/01/19 100%   07/26/19 98%      Temp Readings from Last 1 Encounters:   10/10/19 37.1  C (98.8  F) (Temporal)    Ht Readings from Last 1 Encounters:   10/01/19 1.448 m (4' 9\")      Wt Readings from Last 1 Encounters:   10/10/19 58.9 kg (129 lb 12.8 oz)    Estimated body mass index is 28.09 kg/m  as calculated from the following:    Height as of 10/1/19: 1.448 m (4' 9\").    Weight as of 10/10/19: 58.9 kg (129 lb 12.8 oz).     LDA:  Peripheral IV 07/21/11 Left Hand (Active)   Number of days: 3004       Closed/Suction Drain Left Knee Accordion 10 [place] (Active)   Number of days: 3004        Assessment:   ASA SCORE: 2    H&P: History and physical reviewed and following examination; no interval change.   Smoking Status:  Non-Smoker/Unknown   NPO Status: NPO Appropriate     Plan:   Anes. Type:  MAC; Peripheral Nerve Block; For Post-op pain in coordination with surgeon     Block Details: Single Shot; Supraclav.   Pre-Medication: Acetaminophen; Gabapentin   Induction:  N/a   Airway: Native Airway   Access/Monitoring: PIV   Maintenance: N/a     Postop Plan:   Postop Pain: Regional  Postop Sedation/Airway: Not planned  Disposition: Outpatient     PONV Management:   Adult Risk Factors: Female, Non-Smoker   Prevention: Ondansetron     CONSENT: Direct conversation   Plan and risks discussed with: Patient   Blood Products: Consent Deferred (Minimal Blood Loss)                   Miguel Sullivan DO  "

## 2019-10-11 NOTE — BRIEF OP NOTE
[facility]    Brief Operative Note    Pre-operative diagnosis: Left Carpal Tunnel Syndrome, Left Carpal Metacarpal Osteoarthritis  Post-operative diagnosis Same    Procedure(s):  Left Thumb Carpal Metacarpal Arthroplasty  Left Carpal Tunnel Release    Surgeon(s) and Role:     * Mehran Salgado MD - Primary     * Mehran Macias MD, Resient - Assisting    Anesthesia: MAC with Regional   Estimated blood loss: 2 cc    Specimens: * No specimens in log *  Findings:  See operative report  Complications: None  Implants:    Implant Name Type Inv. Item Serial No.  Lot No. LRB No. Used   arthrex 2.4 x 7.5mm suture anchor Fastak small bone    ARTHREX [id number] Left 1          Postoperative Plan:  Pain: Oral medications as prescribed  Activity: NWB surgical extremity  Wound care: Keep dressings c/d/i until clinic follow up  Disposition: SDS home per PACU standards    -----  Mehran Macias MD, PhD 10/11/2019  Orthopaedic Surgery Resident, PGY4  Pager: [phone]       <<-----Click on this checkbox to enter Family History Family history of hypertension in mother     Sibling  Still living? Yes, Estimated age: Age Unknown  Family history of breast cancer in sister, Age at diagnosis: Age Unknown

## 2019-10-11 NOTE — ANESTHESIA CARE TRANSFER NOTE
Patient: Nisha Perez    Procedure(s):  Left Thumb Carpal Metacarpal Arthroplasty  Left Carpal Tunnel Release    Diagnosis: Left Carpal Tunnel Syndrome, Left Carpal Metacarpal Osteoarthritis  Diagnosis Additional Information: No value filed.    Anesthesia Type:   MAC, Peripheral Nerve Block, For Post-op pain in coordination with surgeon     Note:  Airway :Room Air  Patient transferred to:Phase II  Comments: VSS/WNL. Responds well. C/O mild pain.Handoff Report: Identifed the Patient, Identified the Reponsible Provider, Reviewed the pertinent medical history, Discussed the surgical course, Reviewed Intra-OP anesthesia mangement and issues during anesthesia, Set expectations for post-procedure period and Allowed opportunity for questions and acknowledgement of understanding      Vitals: (Last set prior to Anesthesia Care Transfer)    CRNA VITALS  10/11/2019 1428 - 10/11/2019 1504      10/11/2019             Pulse:  110    SpO2:  96 %    Resp Rate (set):  10                Electronically Signed By: MAKENNA Thomason CRNA  October 11, 2019  3:04 PM

## 2019-10-11 NOTE — DISCHARGE INSTRUCTIONS
"[facility]  Home Care Following Anesthesia  For 24 hours after surgery:  1. Get plenty of rest.  A responsible adult must stay with you for at least 24 hours after you leave the surgery center.  2. Do not drive or use heavy equipment.  If you have weakness or tingling, don't drive or use heavy equipment until this feeling goes away.   3. Do not drink alcohol.   4. Avoid strenuous or risky activities.  Ask for help when climbing stairs.  5. You may feel lightheaded.  IF so, sit for a few minutes before standing.  Have someone help you get up.   6. If you have nausea (feel sick to your stomach): Drink only clear liquids such as apple juice, ginger ale, broth or 7-Up.  Rest may also help.  Be sure to drink enough fluids.  Move to a regular diet as you feel able.   7. You may have a slight fever.  Call the doctor if your fever is over 100 F (37.7 C) (taken under the tongue) or lasts longer than 24 hours.  8. You may have a dry mouth, a sore throat, muscle aches or trouble sleeping. These should go away after 24 hours.  9. Do not make important or legal decisions.        Today you received a Marcaine or bupivacaine block to numb the nerves near your surgery site.  This is a block using local anesthetic or \"numbing\" medication injected around the nerves to anesthetize or \"numb\" the area supplied by those nerves.  This block is injected into the muscle layer near your surgical site.  The medication may numb the location where you had surgery for 6-18 hours, but may last up to 24 hours.  If your surgical site is an arm or leg you should be careful with your affected limb, since it is possible to injure your limb without being aware of it due to the numbing.  Until full feeling returns, you should guard against bumping or hitting your limb, and avoid extreme hot or cold temperatures on the skin.  As the block wears off, the feeling will return as a tingling or prickly sensation near your " surgical site.  You will experience more discomfort from your incision as the feeling returns.  You may want to take a pain pill (a narcotic or Tylenol if this was prescribed by your surgeon) when you start to experience mild pain before the pain beccomes more severe.  If your pain medications do not control your pain you should notifiy your surgeon.    Tips for taking pain medications  To get the best pain relief possible, remember these points:    Take pain medications as directed, before pain becomes severe.    Pain medication can upset your stomach: taking it with food may help.    Constipation is a common side effect of pain medication. Drink plenty of  fluids.    Eat foods high in fiber. Take a stool softener if recommended by your doctor or pharmacist.    Do not drink alcohol, drive or operate machinery while taking pain medications.    Ask about other ways to control pain, such as with heat, ice or relaxation.    Tylenol/Acetaminophen Consumption  To help encourage the safe use of acetaminophen, the makers of TYLENOL  have lowered the maximum daily dose for single-ingredient Extra Strength TYLENOL  (acetaminophen) products sold in the [place] from 8 pills per day (4,000 mg) to 6 pills per day (3,000 mg). The dosing interval has also changed from 2 pills every 4-6 hours to 2 pills every 6 hours.    If you feel your pain relief is insufficient, you may take Tylenol/Acetaminophen in addition to your narcotic pain medication.     Be careful not to exceed 3,000 mg of Tylenol/Acetaminophen in a 24 hour period from all sources.    If you are taking extra strength Tylenol/acetaminophen (500 mg), the maximum dose is 6 tablets in 24 hours.    If you are taking regular strength acetaminophen (325 mg), the maximum dose is 9 tablets in 24 hours.    Call a doctor for any of the followin. Signs of infection (fever, growing tenderness at the surgery site, a large amount of drainage or bleeding, severe pain, foul-smelling  drainage, redness, swelling).  2. It has been over 8 to 10 hours since surgery and you are still not able to urinate (pass water).  3. Headache for over 24 hours.  4. Numbness, tingling or weakness the day after surgery (if you had spinal anesthesia).  Your doctor is:  Dr. Mehran Salgado, Orthopaedics: [phone]                    Or dial [phone] and ask for the resident on call for:  Orthopaedics  For emergency care, call the:  [facility] Emergency Department: [phone] (TTY for hearing impaired: [phone])

## 2019-10-11 NOTE — ANESTHESIA POSTPROCEDURE EVALUATION
Anesthesia POST Procedure Evaluation    Patient: Nisha Perez   MRN:     [id number] Gender:   female   Age:    64 year old :      1955        Preoperative Diagnosis: Left Carpal Tunnel Syndrome, Left Carpal Metacarpal Osteoarthritis   Procedure(s):  Left Thumb Carpal Metacarpal Arthroplasty  Left Carpal Tunnel Release   Postop Comments: No value filed.       Anesthesia Type:  Not documented  MAC, Peripheral Nerve Block, For Post-op pain in coordination with surgeon    Reportable Event: NO     PAIN: Uncomplicated   Sign Out status: Comfortable, Well controlled pain     PONV: No PONV   Sign Out status:  No Nausea or Vomiting     Neuro/Psych: Uneventful perioperative course   Sign Out Status: Preoperative baseline; Age appropriate mentation     Airway/Resp.: Uneventful perioperative course   Sign Out Status: Non labored breathing, age appropriate RR; Resp. Status within EXPECTED Parameters     CV: Uneventful perioperative course   Sign Out status: Appropriate BP and perfusion indices; Appropriate HR/Rhythm     Disposition:   Sign Out in:  PACU  Disposition:  Phase II; Home  Recovery Course: Uneventful  Follow-Up: Not required           Last Anesthesia Record Vitals:  CRNA VITALS  10/11/2019 1428 - 10/11/2019 1528      10/11/2019             Pulse:  110    SpO2:  96 %    Resp Rate (set):  10          Last PACU Vitals:  Vitals Value Taken Time   /77 10/11/2019  3:00 PM   Temp     Pulse 104 10/11/2019  3:00 PM   Resp     SpO2     Temp src     NIBP 108/73 10/11/2019  2:51 PM   Pulse 110 10/11/2019  2:58 PM   SpO2 96 % 10/11/2019  2:58 PM   Resp     Temp     Ht Rate 108 10/11/2019  2:55 PM   Temp 2           Electronically Signed By: Miguel Sullivan DO, 2019, 4:03 PM

## 2019-10-11 NOTE — PROGRESS NOTES
Patient received left side Supraclavicular nerve block  without Exparel. Tolerated procedure well.

## 2019-10-14 NOTE — OP NOTE
Procedure Date: 10/11/2019      PREOPERATIVE DIAGNOSES:   1.  Left thumb carpometacarpal osteoarthritis, primary.   2.  Left carpal tunnel syndrome.      POSTOPERATIVE DIAGNOSIS:     1.  Left thumb carpometacarpal osteoarthritis, primary.   2.  Left carpal tunnel syndrome.      PROCEDURES:   1.  Left thumb CMC arthroplasty, trapezial excision, flexor carpi radialis tendon transfer/interposition.   2.  Left carpal tunnel release.      SURGEON:  Mehran Salgado MD      ASSISTANT:  Mehran Macias MD, PGY-4.      ANESTHESIA:  Regional block, left upper extremity.      HISTORY OF PRESENT ILLNESS AND INDICATIONS:  Nisha is a 64-year-old female with left thumb CMC osteoarthritis and left carpal tunnel syndrome, both of which have been unresponsive to nonoperative care.  She presents today for a left thumb CMC arthroplasty and a left carpal tunnel release.  All questions were answered, and operative consents were signed.      PROCEDURE NOTE:  The patient was given a regional block by the Anesthesia Service, and then she was taken to the operating room, placed supine on an OR table.  Left arm was placed on a hand table, and a tourniquet was placed on the left upper extremity.  The left upper extremity was prepped and draped in standard sterile fashion.  Limb was exsanguinated, and tourniquet was elevated to 250 mmHg.      We started with the carpal tunnel release.  A 2 cm incision was made in line with the radial aspect of the ring finger, extending proximally from Saeed's cardinal line.  Soft tissues were dissected bluntly down to the palmar fascia and then down to the transverse carpal ligament.  This was identified and then released completely under direct vision from distal to proximal.  The distal forearm antebrachial fascia was likewise released under direct vision.  Median nerve was noted to be hyperemic, but there were no masses within the carpal tunnel, and the flexor tendons all appeared to be grossly within normal  limits.  This wound was irrigated and then closed with 5-0 nylon.      We then turned our attention to the thumb CMC joint.  A curvilinear incision was made over the thumb metacarpal base and the trapezium.  The soft tissues were dissected down to the radial sensory nerve and its branches, and these were carefully protected.  The interval between the APL and EPB was identified, and a longitudinal capsulotomy was created.  The trapezium was identified.  The trapezium was excised completely.  There were advanced arthritic changes of the thumb CMC and ST joint as well.  There was a degenerative cyst noted at the base of the thumb metacarpal.  The wound was irrigated copiously.  All small bone fragments were removed.      The FCR was then harvested through a 1 cm transverse incision more proximally at its musculotendinous junction.  A tenotomy was performed here and then the tendon transferred into the distal wound.  The proximal wound was closed with 5-0 nylon suture.      A 2.4 mm Arthrex anchor was placed into the base of the thumb metacarpal.  The FCR tendon was transferred and attached to the thumb metacarpal base with 2-0 FiberWire attached to this anchor, thus suspending the thumb metacarpal nicely.  The remainder of the FCR tendon was then woven into the space previously occupied by the trapezium and secured with multiple 2-0 FiberWire sutures.  The capsule was closed tightly also with 2-0 FiberWire.  Subcutaneous tissue was closed with 3-0 Vicryl, skin closed with 5-0 nylon.  She was placed in a well-padded thumb spica splint.  Tourniquet time was approximately 1 hour.  There were no complications.  She was taken to the recovery room in stable condition.  Postoperatively, we will see her in a week to 10 days, and then she will go into a thumb spica forearm-based OT thermoplastic splint.         ABILIO LANIER MD             D: 10/11/2019   T: 10/12/2019   MT: COOPER      Name:     SALTY CAST   MRN:       [id number]        Account:        [id number]   :      1955           Procedure Date: 10/11/2019      Document: [id number]

## 2019-10-18 ENCOUNTER — OFFICE VISIT (OUTPATIENT)
Dept: ORTHOPEDICS | Facility: CLINIC | Age: 64
End: 2019-10-18
Payer: COMMERCIAL

## 2019-10-18 ENCOUNTER — THERAPY VISIT (OUTPATIENT)
Dept: OCCUPATIONAL THERAPY | Facility: CLINIC | Age: 64
End: 2019-10-18
Payer: COMMERCIAL

## 2019-10-18 DIAGNOSIS — G56.02 CARPAL TUNNEL SYNDROME OF LEFT WRIST: ICD-10-CM

## 2019-10-18 DIAGNOSIS — M18.0 PRIMARY OSTEOARTHRITIS OF BOTH FIRST CARPOMETACARPAL JOINTS: ICD-10-CM

## 2019-10-18 DIAGNOSIS — M79.645 PAIN OF FINGER OF LEFT HAND: Primary | ICD-10-CM

## 2019-10-18 DIAGNOSIS — M18.12 PRIMARY OSTEOARTHRITIS OF FIRST CARPOMETACARPAL JOINT OF LEFT HAND: Primary | ICD-10-CM

## 2019-10-18 PROCEDURE — 97165 OT EVAL LOW COMPLEX 30 MIN: CPT | Mod: GO | Performed by: OCCUPATIONAL THERAPIST

## 2019-10-18 PROCEDURE — 97110 THERAPEUTIC EXERCISES: CPT | Mod: GO | Performed by: OCCUPATIONAL THERAPIST

## 2019-10-18 PROCEDURE — 97760 ORTHOTIC MGMT&TRAING 1ST ENC: CPT | Mod: GO | Performed by: OCCUPATIONAL THERAPIST

## 2019-10-18 NOTE — PROGRESS NOTES
"[facility] Hand Therapy Initial Evaluation     Current Date: 10/18/2019    Diagnosis:   POSTOPERATIVE DIAGNOSIS:     1.  Left thumb carpometacarpal osteoarthritis, primary.   2.  Left carpal tunnel syndrome.      PROCEDURES:   1.  Left thumb CMC arthroplasty, trapezial excision, flexor carpi radialis tendon transfer/interposition.   2.  Left carpal tunnel release.          DOI: chronic for 10 years for the thumb, and CTS for about 10 years  DOS: 10/11/19    Post: 1 w 0 d    SURGEON:  Mehran Salgado MD   Returning in 4 wk: 14/15/19    Subjective:    Nisha Perez being seen for hand therapy.   Problem began 10/11/2019. Where condition occurred: other.Problem occurred: arthritis  and reported as 2/10 on pain scale.  Pertinent medical history includes:  Numbness/tingling and osteoarthritis.  Medical allergies: latex.  Surgeries include:  Orthopedic surgery. Other surgery history details: 2 ankles fused, 2 TKA, and L wrist surgery cyst and ligaments (\"took a bone out\").  Current medications:  Other. Other medications details: nexium and Ambien, and Tylenol .   Primary job tasks include:  Other. Other job/home tasks details: home.  Pain is described as aching and is constant. Pain is the same all the time. Since onset symptoms are gradually improving. Special tests:  X-ray. Previous treatment includes physical therapy. There was none improvement following previous treatment.   Patient is Retired from [employer] . Restrictions include:  Currently not working due to present treatment.          Occupational Profile Information:  Right hand dominant  Prior functional level:  no limitations  Barriers include:none  Mobility: No difficulty  Transportation: drives  Leisure activities/hobbies: having a lot of surgeries this year, Gardening, care for grandkids and kayaking       Functional Outcome Measure:   10/18/2019  Upper Extremity Functional Index Score:  SCORE:   Column Totals: /80: (P) 19   (A lower score indicates " greater disability.)      Objective:  Pain Level (Scale 0-10)   10/18/2019   At Rest 2   With Use 2     Pain Description  Date 10/18/2019   Location wrist, hand and thumb   Pain Quality Aching   Frequency daily     Pain is worst  daytime   Exacerbated by  surgery, dependent position   Relieved by otc medications and rest   Progression improving     Edema Mild  Sensation WNL throughout all nerve distributions; per patient report    ROM   NONE for Wrist and thumb due to MD orders  FINGERS: WNL, with mild flexion lag due to report of pain in hook fist. She is WFL and moving well for this time post surgery    Strength  Contraindicated    Assessment:  Patient presents with symptoms consistent with diagnosis as above,  with surgical  intervention.     Patient's limitations or Problem List includes:  Pain, Decreased ROM/motion, Weakness and Decreased stability of the left wrist and thumb which interferes with the patient's ability to perform Self Care Tasks (dressing, eating, bathing, hygiene/toileting), Recreational Activities, Household Chores and Driving  as compared to previous level of function.    Rehab Potential:  Excellent - Return to full activity, no limitations    Patient will benefit from skilled Occupational Therapy to increase ROM, flexibility, overall strength, stability of wrist and stability of thumb and decrease pain, edema and adherence of scarring to return to previous activity level and resume normal daily tasks and to reach their rehab potential.    Barriers to Learning:  No barrier    Communication Issues:  Patient appears to be able to clearly communicate and understand verbal and written communication and follow directions correctly.    Chart Review: Chart Review, Brief history including review of medical and/or therapy records relating to the presenting problem and Simple history review with patient    Identified Performance Deficits: bathing/showering, toileting, dressing, feeding, hygiene and  grooming, driving and community mobility, home establishment and management, meal preparation and cleanup, sleep, play and leisure activities    Assessment of Occupational Performance:  5 or more Performance Deficits    Clinical Decision Making (Complexity): Low complexity    Treatment Explanation:  The following has been discussed with the patient:  RX ordered/plan of care  Anticipated outcomes  Possible risks and side effects    Plan:  Frequency:  2 X a month, once daily  Duration:  for 2 months    Treatment Plan:   Therapeutic Exercise:  AROM and Tendon Gliding  Manual Techniques:  Scar mobilization, Myofascial release and Manual edema mobilization  Orthotic Fabrication: Static forearm based thumb spica orthosis      Discharge Plan:  Achieve all LTG.  Independent in home treatment program.  Reach maximal therapeutic benefit.    Home Exercise Program:  Finger tendon gliding  Wrist orthosis wear full time except for hygiene, no pinching    Next Visit:  Progress per MD orders in one month

## 2019-10-18 NOTE — PROGRESS NOTES
Nisha is here for reevaluation of her left wrist.  She is status post a left thumb CMC arthroplasty and a left carpal tunnel release from last week.  Doing very well.  Minimal pain at this point.  No further numbness or tingling.  Not waking her up at night.  She has remained in her postoperative dressing.  No fevers or chills.    The past medical history was reviewed and documented in the chart.  This includes medications, surgeries, social history as well as review of systems.    Physical examination, I remove the splint from the left upper extremity today.  Her wounds are healing nicely, no signs of any infection.  She has full sensation in the median nerve distribution and throughout.  No motor deficits.    Impression: Status post left carpal tunnel release, left thumb CMC arthroplasty    Plan: She is doing great.  She can go into a therapy splint today.  She can come out of that for hygiene purposes but otherwise her thumb is going to be immobilized.  We will see her back in 3 to 4 weeks.  We will start some gentle motion at that point.

## 2019-10-18 NOTE — LETTER
10/18/2019       RE: Nisha Perez  [address] [place] [id number]     Dear Colleague,    Thank you for referring your patient, Nisha Perez, to the [facility] ORTH[facility] at [facility]. Please see a copy of my visit note below.    Nisha is here for reevaluation of her left wrist.  She is status post a left thumb CMC arthroplasty and a left carpal tunnel release from last week.  Doing very well.  Minimal pain at this point.  No further numbness or tingling.  Not waking her up at night.  She has remained in her postoperative dressing.  No fevers or chills.    The past medical history was reviewed and documented in the chart.  This includes medications, surgeries, social history as well as review of systems.    Physical examination, I remove the splint from the left upper extremity today.  Her wounds are healing nicely, no signs of any infection.  She has full sensation in the median nerve distribution and throughout.  No motor deficits.    Impression: Status post left carpal tunnel release, left thumb CMC arthroplasty    Plan: She is doing great.  She can go into a therapy splint today.  She can come out of that for hygiene purposes but otherwise her thumb is going to be immobilized.  We will see her back in 3 to 4 weeks.  We will start some gentle motion at that point.    Again, thank you for allowing me to participate in the care of your patient.      Sincerely,    Mehran Salgado MD

## 2019-11-13 PROBLEM — Z47.1 AFTERCARE FOLLOWING RIGHT KNEE JOINT REPLACEMENT SURGERY: Status: RESOLVED | Noted: 2019-04-27 | Resolved: 2019-11-13

## 2019-11-13 PROBLEM — Z96.651 AFTERCARE FOLLOWING RIGHT KNEE JOINT REPLACEMENT SURGERY: Status: RESOLVED | Noted: 2019-04-27 | Resolved: 2019-11-13

## 2019-11-13 PROBLEM — R60.0 LOCALIZED EDEMA: Status: RESOLVED | Noted: 2019-04-27 | Resolved: 2019-11-13

## 2019-11-13 NOTE — PROGRESS NOTES
"Discharge Note    Progress reporting period is from last progress note on 05/22/19 to May 22, 2019.    Nisha failed to follow up and current status is unknown.  Please see information below for last relevant information on current status.  Patient seen for 8 visits.    SUBJECTIVE  Subjective changes noted by patient:  Pt reports she tolerated working in her garden for a couple of hours yesterday, states is stiff today. No new complaints. Pain today rated at 2-3/10 level   .  Current pain level is (2-3/10 ).     Previous pain level was  4/10.   Changes in function:  Yes (See Goal flowsheet attached for changes in current functional level)  Adverse reaction to treatment or activity: None    OBJECTIVE  Changes noted in objective findings: Gait with mild limp and list remaining on the R side. ROM today flexion to 115 (heel slide), passive to 122, extension to -1. Patellar mobility normal medial, lateral and inferior, mild tightness superior glide, Scar mobility near normal. Girth at knee joint line R 40 cm, L 36cm. Quad eccentric control good to 4\" retro step down, concentric control to 6\".      ASSESSMENT/PLAN  Diagnosis: S/P R TKA   Updated problem list and treatment plan:   Pain - HEP  Decreased function - HEP  Decreased strength - HEP  Impaired gait - HEP  STG/LTGs have been met or progress has been made towards goals:  Yes, please see goal flowsheet for most current information  Assessment of Progress: current status is unknown.    Last current status: Pt is progressing as expected   Self Management Plans:  HEP  I have re-evaluated this patient and find that the nature, scope, duration and intensity of the therapy is appropriate for the medical condition of the patient.  Nisha continues to require the following intervention to meet STG and LTG's:  HEP.    Recommendations:  Discharge with current home program.  Patient to follow up with MD as needed.    Please refer to the daily flowsheet for treatment today, total " treatment time and time spent performing 1:1 timed codes.

## 2019-11-15 ENCOUNTER — THERAPY VISIT (OUTPATIENT)
Dept: OCCUPATIONAL THERAPY | Facility: CLINIC | Age: 64
End: 2019-11-15
Payer: COMMERCIAL

## 2019-11-15 ENCOUNTER — OFFICE VISIT (OUTPATIENT)
Dept: ORTHOPEDICS | Facility: CLINIC | Age: 64
End: 2019-11-15
Payer: COMMERCIAL

## 2019-11-15 ENCOUNTER — PREP FOR PROCEDURE (OUTPATIENT)
Dept: ORTHOPEDICS | Facility: CLINIC | Age: 64
End: 2019-11-15

## 2019-11-15 DIAGNOSIS — G56.02 CARPAL TUNNEL SYNDROME OF LEFT WRIST: ICD-10-CM

## 2019-11-15 DIAGNOSIS — M18.0 PRIMARY OSTEOARTHRITIS OF BOTH FIRST CARPOMETACARPAL JOINTS: ICD-10-CM

## 2019-11-15 DIAGNOSIS — M18.12 PRIMARY OSTEOARTHRITIS OF FIRST CARPOMETACARPAL JOINT OF LEFT HAND: Primary | ICD-10-CM

## 2019-11-15 DIAGNOSIS — M79.645 PAIN OF FINGER OF LEFT HAND: Primary | ICD-10-CM

## 2019-11-15 PROCEDURE — 97110 THERAPEUTIC EXERCISES: CPT | Mod: GO | Performed by: OCCUPATIONAL THERAPIST

## 2019-11-15 PROCEDURE — 97763 ORTHC/PROSTC MGMT SBSQ ENC: CPT | Mod: GO | Performed by: OCCUPATIONAL THERAPIST

## 2019-11-15 NOTE — NURSING NOTE
Reason For Visit:   Chief Complaint   Patient presents with     RECHECK     Left thumb CMC arthroplasty, left carpal tunnel release DOS 10/11/19       Primary MD: Emerson Spear    Age: 64 year old    ?  No      There were no vitals taken for this visit.      Pain Assessment  Patient Currently in Pain: Yes  0-10 Pain Scale: 2               QuickDASH Assessment  No flowsheet data found.       Current Outpatient Medications   Medication Sig Dispense Refill     acetaminophen (TYLENOL) 325 MG tablet Take 3 tablets (975 mg) by mouth every 8 hours 100 tablet 0     cetirizine (ZYRTEC) 5 MG tablet Take 5 mg by mouth daily       esomeprazole (NEXIUM) 40 MG DR capsule Take 40 mg by mouth every morning (before breakfast) Take 30-60 minutes before eating.       fluticasone (FLONASE) 50 MCG/ACT nasal spray Spray 1 spray into both nostrils 2 times daily       HYDROcodone-acetaminophen (NORCO) 5-325 MG tablet Take 1-2 tablets by mouth every 4 hours as needed for moderate to severe pain 20 tablet 0     ibuprofen (ADVIL/MOTRIN) 100 MG tablet Take 100 mg by mouth every 4 hours as needed       Pseudoephedrine HCl (SUDAFED PO) Take  by mouth at onset of headache.       zolpidem ER (AMBIEN CR) 6.25 MG CR tablet Take 6.25 mg by mouth nightly as needed for sleep         Allergies   Allergen Reactions     Latex Itching     burning     Latex      PN: Converted from LW Latex Sensitivity Flag       Day Cota, CHANDAN

## 2019-11-15 NOTE — PROGRESS NOTES
SOAP note objective information for 11/15/2019.  Please refer to the daily flowsheet for treatment today, total treatment time and time spent performing 1:1 timed codes.        Diagnosis:   POSTOPERATIVE DIAGNOSIS:     1.  Left thumb carpometacarpal osteoarthritis, primary.   2.  Left carpal tunnel syndrome.      PROCEDURES:   1.  Left thumb CMC arthroplasty, trapezial excision, flexor carpi radialis tendon transfer/interposition.   2.  Left carpal tunnel release.          DOI: chronic for 10 years for the thumb, and CTS for about 10 years  DOS: 10/11/19    Post: 5w 0d    SURGEON:  Mehran Salgado MD   Returnin19 for R Thumb CMC OA    S:  Subjective changes as noted by patient: still has some tingling, in L finger tips, out of FBTS and ready for shorter thumb spica splint. Using her L fingers, but has avoided using the thumb.    Functional changes noted by patient: Improvement in Self Care Tasks (dressing) and Household Chores  Response to previous treatment:  good  Patient has noted adverse reaction to:   None        .Occupational Profile Information:  Right hand dominant  Prior functional level:  no limitations  Barriers include:none  Mobility: No difficulty  Transportation: drives  Leisure activities/hobbies: having a lot of surgeries this year, Gardening, care for grandkids and kayaking       Functional Outcome Measure:   10/18/2019  Upper Extremity Functional Index Score:  SCORE:   Column Totals: /80: (P) 19   (A lower score indicates greater disability.)      Objective:  Pain Level (Scale 0-10)   10/18/2019 11/15   At Rest 2 0   With Use 2 2     Pain Description  Date 10/18/2019 11/15/19   Location wrist, hand and thumb    Pain Quality Aching No pain still some tingling   Frequency daily      Pain is worst  daytime    Exacerbated by  surgery, dependent position    Relieved by otc medications and rest    Progression improving improving     Edema Mild  Sensation WNL throughout all nerve distributions;  per patient report    ROM   FINGERS: WNL, with mild flexion lag due to report of pain in hook fist. She is WFL and moving well for this time post surgery  ROM:  Thumb 11/15/2019   AROM(PROM) Left   MP 0/30   IP 0/45   RAbd    PAbd 52   Retropulsion    Kapandji Opposition Scale (0-10/10)        Wrist 11/15/2019 11/15/2019   AROM(PROM) Right Left   Extension 85 75   Flexion 72 40   RD 15 15   UD 44 32   Supination     Pronation         Strength  Contraindicated      Assessment:  Response to therapy has been improvement to:  ROM of Wrist:  Ext , Flex, Radial Deviation and Ulnar Deviation  Thumb:  Ext , Flex, Radial Abduction, Palmar Abduction and opposition    Overall Assessment:  New orders have been received.  Patient would benefit from continued therapy to achieve rehab potential  STG/LTG:  STGoals have been reviewed and progress or achievement has occurred;  see goal sheet for details and updates.  I have re-evaluated this patient and find that the nature, scope, duration and intensity of the therapy is appropriate for the medical condition of the patient.        Plan:  Frequency:  2 X a month, once daily  Duration:  for 2 months    Treatment Plan:   Therapeutic Exercise:  AROM and Tendon Gliding  Manual Techniques:  Scar mobilization, Myofascial release and Manual edema mobilization  Orthotic Fabrication: Static forearm based thumb spica orthosis      Discharge Plan:  Achieve all LTG.  Independent in home treatment program.  Reach maximal therapeutic benefit.    Home Exercise Program:  Finger tendon gliding  Wrist orthosis wear full time except for hygiene, no pinching  11/15/2019  start wrist and thumb AROM for CMC OArthroplasty  Opposition to Middle finger is goal this week  Wear HBTSS full time, remove for exer and hygiene, no pinching more than 2 #    Next Visit:  Progress per MD orders in one month

## 2019-11-15 NOTE — LETTER
11/15/2019       RE: Nisha Perez  [address] [place] [phone]     Dear Colleague,    Thank you for referring your patient, Nisha Perez, to the [facility] ORTHOPAEDIC [facility]INIC at [facility]. Please see a copy of my visit note below.    Lisa is here for follow-up of her left thumb CMC arthroplasty and her left carpal tunnel release.  She is doing well.  5 weeks out now.  Very little pain over the thumb CMC joint at this point.  Still some numbness in the median nerve distribution.  No fevers or chills.  She has been splinted.    The past medical history was reviewed and documented in the chart.  This includes medications, surgeries, social history as well as review of systems.    Physical examination of the left hand demonstrates well-healed incisions, no signs of any infection.  The carpal tunnel incision is slightly adherent.  Negative Tinel's and Phalen's though.  Very little tenderness over the thumb CMC joint.  She does have tenderness over the right thumb CMC joint but a negative Tinel's and Phalen's and carpal tunnel compression test on the right.  No motor, no sensory deficits are noted.  No significant atrophy.  There is brisk capillary refill in all digits and a palpable radial pulse.  No overlying skin changes noted.    Impression:  Bilateral thumb CMC joint osteoarthritis, status post left thumb CMC joint arthroplasty, left carpal tunnel release    Plan:  We are set up for a right thumb CMC arthroplasty at the end of December.  We are planning on doing a right carpal tunnel release as well but she has very little symptoms from a right carpal tunnel syndrome at this point.  We continue to monitor that and just do the thumb CMC arthroplasty.  We discussed the risks and benefits of surgery as well as anticipated perioperative course.  These risks include but are not limited to infection, bleeding, stiffness, scarring, injury to blood vessels, tendons,  nerves.  All questions were answered.  Arrangements for surgery have been made for December 27.  Follow-up 1 week postop.    Again, thank you for allowing me to participate in the care of your patient.      Sincerely,    Mehran Salgado MD

## 2019-11-15 NOTE — PROGRESS NOTES
Lisa is here for follow-up of her left thumb CMC arthroplasty and her left carpal tunnel release.  She is doing well.  5 weeks out now.  Very little pain over the thumb CMC joint at this point.  Still some numbness in the median nerve distribution.  No fevers or chills.  She has been splinted.    The past medical history was reviewed and documented in the chart.  This includes medications, surgeries, social history as well as review of systems.    Physical examination of the left hand demonstrates well-healed incisions, no signs of any infection.  The carpal tunnel incision is slightly adherent.  Negative Tinel's and Phalen's though.  Very little tenderness over the thumb CMC joint.  She does have tenderness over the right thumb CMC joint but a negative Tinel's and Phalen's and carpal tunnel compression test on the right.  No motor, no sensory deficits are noted.  No significant atrophy.  There is brisk capillary refill in all digits and a palpable radial pulse.  No overlying skin changes noted.    Impression:  Bilateral thumb CMC joint osteoarthritis, status post left thumb CMC joint arthroplasty, left carpal tunnel release    Plan:  We are set up for a right thumb CMC arthroplasty at the end of December.  We are planning on doing a right carpal tunnel release as well but she has very little symptoms from a right carpal tunnel syndrome at this point.  We continue to monitor that and just do the thumb CMC arthroplasty.  We discussed the risks and benefits of surgery as well as anticipated perioperative course.  These risks include but are not limited to infection, bleeding, stiffness, scarring, injury to blood vessels, tendons, nerves.  All questions were answered.  Arrangements for surgery have been made for December 27.  Follow-up 1 week postop.

## 2019-11-20 ENCOUNTER — THERAPY VISIT (OUTPATIENT)
Dept: OCCUPATIONAL THERAPY | Facility: CLINIC | Age: 64
End: 2019-11-20
Payer: COMMERCIAL

## 2019-11-20 DIAGNOSIS — M79.645 PAIN OF FINGER OF LEFT HAND: ICD-10-CM

## 2019-11-20 DIAGNOSIS — M18.0 PRIMARY OSTEOARTHRITIS OF BOTH FIRST CARPOMETACARPAL JOINTS: ICD-10-CM

## 2019-11-20 DIAGNOSIS — G56.02 CARPAL TUNNEL SYNDROME OF LEFT WRIST: ICD-10-CM

## 2019-11-20 PROCEDURE — 97140 MANUAL THERAPY 1/> REGIONS: CPT | Mod: GO | Performed by: OCCUPATIONAL THERAPIST

## 2019-11-20 PROCEDURE — 97110 THERAPEUTIC EXERCISES: CPT | Mod: GO | Performed by: OCCUPATIONAL THERAPIST

## 2019-11-29 ENCOUNTER — TELEPHONE (OUTPATIENT)
Dept: ORTHOPEDICS | Facility: CLINIC | Age: 64
End: 2019-11-29

## 2019-12-04 ENCOUNTER — THERAPY VISIT (OUTPATIENT)
Dept: OCCUPATIONAL THERAPY | Facility: CLINIC | Age: 64
End: 2019-12-04
Payer: COMMERCIAL

## 2019-12-04 DIAGNOSIS — M18.0 PRIMARY OSTEOARTHRITIS OF BOTH FIRST CARPOMETACARPAL JOINTS: ICD-10-CM

## 2019-12-04 DIAGNOSIS — G56.02 CARPAL TUNNEL SYNDROME OF LEFT WRIST: ICD-10-CM

## 2019-12-04 DIAGNOSIS — M79.645 PAIN OF FINGER OF LEFT HAND: ICD-10-CM

## 2019-12-04 PROCEDURE — 97140 MANUAL THERAPY 1/> REGIONS: CPT | Mod: GO | Performed by: OCCUPATIONAL THERAPIST

## 2019-12-04 PROCEDURE — 97110 THERAPEUTIC EXERCISES: CPT | Mod: GO | Performed by: OCCUPATIONAL THERAPIST

## 2019-12-04 NOTE — PROGRESS NOTES
Progress Note  for 19  Please refer to the daily flowsheet for treatment today, total treatment time and time spent performing 1:1 timed codes.        Diagnosis:   POSTOPERATIVE DIAGNOSIS:     1.  Left thumb carpometacarpal osteoarthritis, primary.   2.  Left carpal tunnel syndrome.      PROCEDURES:   1.  Left thumb CMC arthroplasty, trapezial excision, flexor carpi radialis tendon transfer/interposition.   2.  Left carpal tunnel release.      DOI: chronic for 10 years for the thumb, and CTS for about 10 years  DOS: 10/11/19    Post: 7w 5d    SURGEON:  Mehran Salgado MD   Returnin19 for R Thumb CMC OA    S:  Subjective changes as noted by patient: Only wearing the brace at night.  I am still protecting the thumb when doing tasks  Functional changes noted by patient: Improvement in Self Care Tasks (dressing) and Household Chores  Response to previous treatment:  good  Patient has noted adverse reaction to:   None    Leisure activities/hobbies: having a lot of surgeries this year, Gardening, care for grandkids and kayaking       Functional Outcome Measure:   10/18/2019  Upper Extremity Functional Index Score:  SCORE:   Column Totals: /80: (P) 19   (A lower score indicates greater disability.)      Objective:  Pain Level (Scale 0-10)   10/18/2019 11/15 12/4/19   At Rest 2 0 0   With Use 2 2 2     Pain Description  Date 10/18/2019 11/15/19   Location wrist, hand and thumb    Pain Quality Aching No pain still some tingling   Frequency daily      Pain is worst  daytime    Exacerbated by  surgery, dependent position    Relieved by otc medications and rest    Progression improving improving     Edema Mild  Sensation WNL throughout all nerve distributions; per patient report    ROM   FINGERS: WNL, with mild flexion lag due to report of pain in hook fist. She is WFL and moving well for this time post surgery  ROM:  Thumb 11/15/2019 12/4/19   AROM(PROM) Left Left   MP 0/30 45   IP 0/45 60   RAbd  55   PAbd 52  52   Retropulsion     Kapandji Opposition Scale (0-10/10)  7/10       Wrist 11/15/2019 11/15/2019 12/4/19   AROM(PROM) Right Left Left   Extension 85 75 80   Flexion 72 40 60 pre  75 post   RD 15 15 20   UD 44 32 40   Supination      Pronation          Strength  Strength   Painfree (Measured in pounds)  Pain Report:  + mild    ++ moderate    +++ severe    12/4/2019 12/4/2019   Trials Right Left   1  2  3 42  46  46 19  20  23   Average 45 21     Lat Pinch 12/4/2019 12/4/2019   Trials Right Left   1  2  3 10 2++   Average       3 Pt Pinch 12/4/2019 12/4/2019   Trials Right Left   1  2  3 11 1++   Average         Plan:  Frequency:  2 X a month, once daily  Duration:  for 3 months    Treatment Plan:   Therapeutic Exercise:  AROM and Tendon Gliding  Manual Techniques:  Scar mobilization, Myofascial release and Manual edema mobilization  Orthotic Fabrication: Static forearm based thumb spica orthosis      Discharge Plan:  Achieve all LTG.  Independent in home treatment program.  Reach maximal therapeutic benefit.    Home Exercise Program:  Finger tendon gliding  11/15/2019  start wrist and thumb AROM for CMC OArthroplasty  Opposition  Wear HBTSS for protection only  Gentle  and pinch strengthening    Next Visit:  No further visits scheduled at this time. The chart will be left open for one month to allow patient to schedule further appointments if problems develop. If no additional therapy sessions are scheduled, then the patient will be discharged and this will serve as a discharge note.

## 2019-12-13 NOTE — PATIENT INSTRUCTIONS
Before Your Surgery      Call your surgeon if there is any change in your health. This includes signs of a cold or flu (such as a sore throat, runny nose, cough, rash or fever).    Do not smoke, drink alcohol or take over the counter medicine (unless your surgeon or primary care doctor tells you to) for the 24 hours before and after surgery.    If you take prescribed drugs: Follow your doctor s orders about which medicines to take and which to stop until after surgery. Stop ibuprofen 1 week prior to surgery.    Eating and drinking prior to surgery: follow the instructions from your surgeon    Take a shower or bath the night before surgery. Use the soap your surgeon gave you to gently clean your skin. If you do not have soap from your surgeon, use your regular soap. Do not shave or scrub the surgery site.  Wear clean pajamas and have clean sheets on your bed.

## 2019-12-13 NOTE — PROGRESS NOTES
[address]  [facility] [address]  [phone]  Dept: [phone]    PRE-OP EVALUATION:  Today's date: 2019    Nisha Perez (: 1955) presents for pre-operative evaluation assessment as requested by Dr. Salgado.  She requires evaluation and anesthesia risk assessment prior to undergoing surgery/procedure for treatment of right CMC joint arthritis.    Fax number for surgical facility: available electronically   Primary Physician: Emerson Spear  Type of Anesthesia Anticipated: MAC with block    Patient has a Health Care Directive or Living Will:  YES     Preop Questions 2019   Who is doing your surgery? Dr. Salgado   What are you having done? right cmc arthroplasty   Date of Surgery/Procedure: 19   Facility or Hospital where procedure/surgery will be performed: [facility] center   1.  Do you have a history of Heart attack, stroke, stent, coronary bypass surgery, or other heart surgery? No   2.  Do you ever have any pain or discomfort in your chest? No   3.  Do you have a history of  Heart Failure? No   4.   Are you troubled by shortness of breath when:  walking on a level surface, or up a slight hill, or at night? No   5.  Do you currently have a cold, bronchitis or other respiratory infection? No   6.  Do you have a cough, shortness of breath, or wheezing? No   7.  Do you sometimes get pains in the calves of your legs when you walk? No   8. Do you or anyone in your family have previous history of blood clots? No   9.  Do you or does anyone in your family have a serious bleeding problem such as prolonged bleeding following surgeries or cuts? No   10. Have you ever had problems with anemia or been told to take iron pills? No   11. Have you had any abnormal blood loss such as black, tarry or bloody stools, or abnormal vaginal bleeding? No   12. Have you ever had a blood transfusion? No   13. Have you or any of your relatives ever had  problems with anesthesia? No   14. Do you have sleep apnea, excessive snoring or daytime drowsiness? No   15. Do you have any prosthetic heart valves? No   16. Do you have prosthetic joints? YES - right knee   17. Is there any chance that you may be pregnant? No         HPI:     HPI related to upcoming procedure: She has right CMC joint arthritis and has failed conservative measures so will be undergoing a right CMC arthroplasty with Dr. Salgado on 12/27. She underwent this procedure on the left side in October and has noticed significant improvement in her pain.       See problem list for active medical problems.  Problems all longstanding and stable, except as noted/documented.  See ROS for pertinent symptoms related to these conditions.      MEDICAL HISTORY:     Patient Active Problem List    Diagnosis Date Noted     Primary osteoarthritis of first carpometacarpal joint of left hand 11/15/2019     Priority: Medium     Added automatically from request for surgery [id number]       Pain of finger of left hand 10/18/2019     Priority: Medium     Carpal tunnel syndrome of left wrist 10/18/2019     Priority: Medium     Primary osteoarthritis of both first carpometacarpal joints 10/18/2019     Priority: Medium     Latex allergy 10/01/2019     Priority: Medium     Status post knee surgery 03/28/2019     Priority: Medium     Phobia, flying 03/28/2017     Priority: Medium     Gastroesophageal reflux disease without esophagitis 03/28/2017     Priority: Medium     Advanced directives, counseling/discussion 11/13/2015     Priority: Medium     Patient has a health directive--copy to be obtained as able.  Roderick Hunter MD         Anxiety disorder      Priority: Medium     Subluxation of patella, acquired 08/04/2011     Priority: Medium     Aftercare following joint replacement 08/04/2011     Priority: Medium     Knee joint replacement by other means 08/04/2011     Priority: Medium     iamJOINT DERANGEMENT NEC-L/LEG 12/05/2006      Priority: Medium      Past Medical History:   Diagnosis Date     Anxiety disorder      Benign essential hypertension 10/8/2019     Gastro-oesophageal reflux disease     esophagitis     Injury of knee, left      Insomnia      Osteoarthritis      Osteoporosis      Past Surgical History:   Procedure Laterality Date     APPENDECTOMY       ARTHRODESIS ANKLE      left, staples      ARTHROPLASTY CARPOMETACARPAL (THUMB JOINT) Left 10/11/2019    Procedure: Left Thumb Carpal Metacarpal Arthroplasty;  Surgeon: Mehran Salgado MD;  Location: [facility]     ARTHROPLASTY KNEE Right 3/28/2019    Procedure: Right Total Knee Arthroplasty;  Surgeon: Monie Lloyd MD;  Location: [facility]     ARTHROPLASTY PATELLO-FEMORAL (KNEE)  2011    Procedure:ARTHROPLASTY PATELLO-FEMORAL (KNEE); Left Knee Latia Arthrolplasty Prosthesis, Removal of hardware left knee; Surgeon:MONIE LLOYD; [facility]     ARTHROSCOPY KNEE      left     ARTHROSCOPY KNEE      right, maureen      SECTION      X 4     COMBINED ESOPHAGOSCOPY, GASTROSCOPY, DUODENOSCOPY (EGD) WITH CO2 INSUFFLATION N/A 2019    Procedure: ESOPHAGOGASTRODUODENOSCOPY, WITH CO2 INSUFFLATION;  Surgeon: Juan M Cox MD;  Location: [facility] OR     ESOPHAGOSCOPY, GASTROSCOPY, DUODENOSCOPY (EGD), COMBINED N/A 2019    Procedure: Esophagogastroduodenoscopy, With Biopsy;  Surgeon: Juan M Cox MD;  Location: [facility] OR     LYSIS OF ADHESIONS KNEE Right 2019    Procedure: Right Knee Mini Open Lysis of Adhesions;  Surgeon: Monie Lloyd MD;  Location: [facility]     ORTHOPEDIC SURGERY      left knee partial knee replacement     RELEASE CARPAL TUNNEL Left 10/11/2019    Procedure: Left Carpal Tunnel Release;  Surgeon: Mehran Salgado MD;  Location: [facility]     TONSILLECTOMY       Current Outpatient Medications   Medication Sig Dispense Refill     acetaminophen (TYLENOL) 325 MG tablet Take 3 tablets (975 mg) by mouth every 8 hours 100  "tablet 0     cetirizine (ZYRTEC) 5 MG tablet Take 5 mg by mouth daily       esomeprazole (NEXIUM) 40 MG DR capsule Take 40 mg by mouth every morning (before breakfast) Take 30-60 minutes before eating.       fluticasone (FLONASE) 50 MCG/ACT nasal spray Spray 1 spray into both nostrils 2 times daily       ibuprofen (ADVIL/MOTRIN) 100 MG tablet Take 100 mg by mouth every 4 hours as needed       Pseudoephedrine HCl (SUDAFED PO) Take  by mouth at onset of headache.       zolpidem ER (AMBIEN CR) 6.25 MG CR tablet Take 6.25 mg by mouth nightly as needed for sleep       OTC products: NSAIDS    Allergies   Allergen Reactions     Latex Itching     burning     Latex      PN: Converted from LW Latex Sensitivity Flag      Latex Allergy: NO    Social History     Tobacco Use     Smoking status: Never Smoker     Smokeless tobacco: Never Used   Substance Use Topics     Alcohol use: Yes     Comment: 4-6 drinks per week     History   Drug Use No       REVIEW OF SYSTEMS:   CONSTITUTIONAL: NEGATIVE for fever, chills, change in weight  INTEGUMENTARY/SKIN: NEGATIVE for worrisome rashes, moles or lesions  EYES: NEGATIVE for vision changes or irritation  ENT/MOUTH: NEGATIVE for ear, mouth and throat problems  RESP: NEGATIVE for significant cough or SOB  CV: NEGATIVE for chest pain, palpitations or peripheral edema  GI: NEGATIVE for nausea, abdominal pain, heartburn, or change in bowel habits  : NEGATIVE for frequency, dysuria, or hematuria  MUSCULOSKELETAL: +Right thumb pain.   NEURO: NEGATIVE for weakness, dizziness or paresthesias  ENDOCRINE: NEGATIVE for temperature intolerance, skin/hair changes  HEME: NEGATIVE for bleeding problems  PSYCHIATRIC: NEGATIVE for changes in mood or affect    EXAM:   /78   Pulse 73   Temp 98.8  F (37.1  C) (Temporal)   Resp 16   Ht 1.448 m (4' 9\")   Wt 59.9 kg (132 lb)   SpO2 98%   BMI 28.56 kg/m      GENERAL APPEARANCE: healthy, alert and no distress     EYES: EOMI, PERRL     HENT: ear " canals and TM's normal and nose and mouth without ulcers or lesions     NECK: no adenopathy, no asymmetry, masses, or scars and thyroid normal to palpation     RESP: lungs clear to auscultation - no rales, rhonchi or wheezes     CV: regular rates and rhythm, normal S1 S2, no S3 or S4 and no murmur, click or rub     ABDOMEN:  soft, nontender, no HSM or masses and bowel sounds normal     MS: extremities normal- no gross deformities noted, no evidence of inflammation in joints, FROM in all extremities. Tenderness over right first CMC joint.      SKIN: no suspicious lesions or rashes     NEURO: Normal strength and tone, sensory exam grossly normal, mentation intact and speech normal. Gait is stable.      PSYCH: mentation appears normal. and affect normal/bright     LYMPHATICS: No cervical adenopathy    DIAGNOSTICS:   EKG: Not indicated due to non-vascular surgery and low risk of event (age <65 and without cardiac risk factors)    Recent Labs   Lab Test 10/01/19  1127 07/19/19  1149  03/11/19  1043   HGB 13.8 13.7   < > 14.1   PLT  --   --   --  245   NA  --   --   --  139   POTASSIUM  --   --   --  4.0   CR  --   --   --  0.69    < > = values in this interval not displayed.     IMPRESSION:   Reason for surgery/procedure: right CMC arthritis  Diagnosis/reason for consult: pre-operative clearance    The proposed surgical procedure is considered LOW/MODERATE risk.    REVISED CARDIAC RISK INDEX  The patient has the following serious cardiovascular risks for perioperative complications such as (MI, PE, VFib and 3  AV Block):  No serious cardiac risks  INTERPRETATION: 0 risks: Class I (very low risk - 0.4% complication rate)    The patient has the following additional risks for perioperative complications:  No identified additional risks      ICD-10-CM    1. Preop general physical exam Z01.818    2. CMC arthritis M19.049        Cleared for surgery.      RECOMMENDATIONS:     --Patient is to take all scheduled medications on  the day of surgery EXCEPT for modifications listed below.    Anticoagulant or Antiplatelet Medication Use  NSAIDS: Ibuprofen (Motrin):  Stop 7 days prior to surgery        APPROVAL GIVEN to proceed with proposed procedure, without further diagnostic evaluation       Signed Electronically by: Emerson Spear PA-C    Copy of this evaluation report is provided to requesting physician.    [facility] Preop Guidelines    Revised Cardiac Risk Index

## 2019-12-19 ENCOUNTER — OFFICE VISIT (OUTPATIENT)
Dept: FAMILY MEDICINE | Facility: OTHER | Age: 64
End: 2019-12-19
Payer: COMMERCIAL

## 2019-12-19 VITALS
HEIGHT: 57 IN | OXYGEN SATURATION: 98 % | DIASTOLIC BLOOD PRESSURE: 78 MMHG | BODY MASS INDEX: 28.48 KG/M2 | SYSTOLIC BLOOD PRESSURE: 130 MMHG | HEART RATE: 73 BPM | RESPIRATION RATE: 16 BRPM | TEMPERATURE: 98.8 F | WEIGHT: 132 LBS

## 2019-12-19 DIAGNOSIS — M19.049 CMC ARTHRITIS: ICD-10-CM

## 2019-12-19 DIAGNOSIS — Z01.818 PREOP GENERAL PHYSICAL EXAM: Primary | ICD-10-CM

## 2019-12-19 PROCEDURE — 99214 OFFICE O/P EST MOD 30 MIN: CPT | Performed by: PHYSICIAN ASSISTANT

## 2019-12-19 ASSESSMENT — MIFFLIN-ST. JEOR: SCORE: 1022.63

## 2019-12-26 ENCOUNTER — ANESTHESIA EVENT (OUTPATIENT)
Dept: SURGERY | Facility: AMBULATORY SURGERY CENTER | Age: 64
End: 2019-12-26

## 2019-12-27 ENCOUNTER — HOSPITAL ENCOUNTER (OUTPATIENT)
Facility: AMBULATORY SURGERY CENTER | Age: 64
End: 2019-12-27
Attending: ORTHOPAEDIC SURGERY
Payer: COMMERCIAL

## 2019-12-27 ENCOUNTER — ANESTHESIA (OUTPATIENT)
Dept: SURGERY | Facility: AMBULATORY SURGERY CENTER | Age: 64
End: 2019-12-27

## 2019-12-27 VITALS
TEMPERATURE: 98 F | SYSTOLIC BLOOD PRESSURE: 130 MMHG | RESPIRATION RATE: 16 BRPM | BODY MASS INDEX: 28.05 KG/M2 | OXYGEN SATURATION: 97 % | DIASTOLIC BLOOD PRESSURE: 90 MMHG | HEART RATE: 74 BPM | HEIGHT: 57 IN | WEIGHT: 130 LBS

## 2019-12-27 DIAGNOSIS — M18.12 PRIMARY OSTEOARTHRITIS OF FIRST CARPOMETACARPAL JOINT OF LEFT HAND: ICD-10-CM

## 2019-12-27 DEVICE — IMPLANTABLE DEVICE: Type: IMPLANTABLE DEVICE | Site: THUMB | Status: FUNCTIONAL

## 2019-12-27 RX ORDER — NALOXONE HYDROCHLORIDE 0.4 MG/ML
.1-.4 INJECTION, SOLUTION INTRAMUSCULAR; INTRAVENOUS; SUBCUTANEOUS
Status: DISCONTINUED | OUTPATIENT
Start: 2019-12-27 | End: 2019-12-28 | Stop reason: HOSPADM

## 2019-12-27 RX ORDER — OXYCODONE HYDROCHLORIDE 5 MG/1
5 TABLET ORAL EVERY 4 HOURS PRN
Status: DISCONTINUED | OUTPATIENT
Start: 2019-12-27 | End: 2019-12-28 | Stop reason: HOSPADM

## 2019-12-27 RX ORDER — CEFAZOLIN SODIUM 2 G/50ML
2 SOLUTION INTRAVENOUS
Status: COMPLETED | OUTPATIENT
Start: 2019-12-27 | End: 2019-12-27

## 2019-12-27 RX ORDER — ONDANSETRON 4 MG/1
4 TABLET, ORALLY DISINTEGRATING ORAL EVERY 30 MIN PRN
Status: DISCONTINUED | OUTPATIENT
Start: 2019-12-27 | End: 2019-12-28 | Stop reason: HOSPADM

## 2019-12-27 RX ORDER — FENTANYL CITRATE 50 UG/ML
25-50 INJECTION, SOLUTION INTRAMUSCULAR; INTRAVENOUS
Status: DISCONTINUED | OUTPATIENT
Start: 2019-12-27 | End: 2019-12-28 | Stop reason: HOSPADM

## 2019-12-27 RX ORDER — PROPOFOL 10 MG/ML
INJECTION, EMULSION INTRAVENOUS CONTINUOUS PRN
Status: DISCONTINUED | OUTPATIENT
Start: 2019-12-27 | End: 2019-12-27

## 2019-12-27 RX ORDER — ACETAMINOPHEN 325 MG/1
975 TABLET ORAL ONCE
Status: COMPLETED | OUTPATIENT
Start: 2019-12-27 | End: 2019-12-27

## 2019-12-27 RX ORDER — OXYCODONE AND ACETAMINOPHEN 5; 325 MG/1; MG/1
1-2 TABLET ORAL EVERY 6 HOURS PRN
Qty: 15 TABLET | Refills: 0 | Status: SHIPPED | OUTPATIENT
Start: 2019-12-27

## 2019-12-27 RX ORDER — FLUMAZENIL 0.1 MG/ML
0.2 INJECTION, SOLUTION INTRAVENOUS
Status: DISCONTINUED | OUTPATIENT
Start: 2019-12-27 | End: 2019-12-28 | Stop reason: HOSPADM

## 2019-12-27 RX ORDER — LIDOCAINE HYDROCHLORIDE 20 MG/ML
INJECTION, SOLUTION INFILTRATION; PERINEURAL PRN
Status: DISCONTINUED | OUTPATIENT
Start: 2019-12-27 | End: 2019-12-27

## 2019-12-27 RX ORDER — BUPIVACAINE HYDROCHLORIDE 5 MG/ML
INJECTION, SOLUTION EPIDURAL; INTRACAUDAL PRN
Status: DISCONTINUED | OUTPATIENT
Start: 2019-12-27 | End: 2019-12-27

## 2019-12-27 RX ORDER — ONDANSETRON 2 MG/ML
4 INJECTION INTRAMUSCULAR; INTRAVENOUS EVERY 30 MIN PRN
Status: DISCONTINUED | OUTPATIENT
Start: 2019-12-27 | End: 2019-12-28 | Stop reason: HOSPADM

## 2019-12-27 RX ORDER — MEPERIDINE HYDROCHLORIDE 25 MG/ML
12.5 INJECTION INTRAMUSCULAR; INTRAVENOUS; SUBCUTANEOUS
Status: DISCONTINUED | OUTPATIENT
Start: 2019-12-27 | End: 2019-12-28 | Stop reason: HOSPADM

## 2019-12-27 RX ORDER — LEVOCETIRIZINE DIHYDROCHLORIDE 5 MG/1
5 TABLET, FILM COATED ORAL EVERY EVENING
COMMUNITY

## 2019-12-27 RX ORDER — HYDROMORPHONE HYDROCHLORIDE 1 MG/ML
.3-.5 INJECTION, SOLUTION INTRAMUSCULAR; INTRAVENOUS; SUBCUTANEOUS EVERY 10 MIN PRN
Status: DISCONTINUED | OUTPATIENT
Start: 2019-12-27 | End: 2019-12-28 | Stop reason: HOSPADM

## 2019-12-27 RX ORDER — CEFAZOLIN SODIUM 1 G/50ML
1 SOLUTION INTRAVENOUS SEE ADMIN INSTRUCTIONS
Status: DISCONTINUED | OUTPATIENT
Start: 2019-12-27 | End: 2019-12-28 | Stop reason: HOSPADM

## 2019-12-27 RX ORDER — SODIUM CHLORIDE, SODIUM LACTATE, POTASSIUM CHLORIDE, CALCIUM CHLORIDE 600; 310; 30; 20 MG/100ML; MG/100ML; MG/100ML; MG/100ML
INJECTION, SOLUTION INTRAVENOUS CONTINUOUS
Status: DISCONTINUED | OUTPATIENT
Start: 2019-12-27 | End: 2019-12-28 | Stop reason: HOSPADM

## 2019-12-27 RX ORDER — GABAPENTIN 300 MG/1
300 CAPSULE ORAL ONCE
Status: COMPLETED | OUTPATIENT
Start: 2019-12-27 | End: 2019-12-27

## 2019-12-27 RX ORDER — LIDOCAINE 40 MG/G
CREAM TOPICAL
Status: DISCONTINUED | OUTPATIENT
Start: 2019-12-27 | End: 2019-12-28 | Stop reason: HOSPADM

## 2019-12-27 RX ADMIN — ACETAMINOPHEN 975 MG: 325 TABLET ORAL at 13:03

## 2019-12-27 RX ADMIN — LIDOCAINE HYDROCHLORIDE 80 MG: 20 INJECTION, SOLUTION INFILTRATION; PERINEURAL at 13:51

## 2019-12-27 RX ADMIN — PROPOFOL 200 MCG/KG/MIN: 10 INJECTION, EMULSION INTRAVENOUS at 13:50

## 2019-12-27 RX ADMIN — CEFAZOLIN SODIUM 2 G: 2 SOLUTION INTRAVENOUS at 13:53

## 2019-12-27 RX ADMIN — FENTANYL CITRATE 50 MCG: 50 INJECTION, SOLUTION INTRAMUSCULAR; INTRAVENOUS at 13:17

## 2019-12-27 RX ADMIN — GABAPENTIN 300 MG: 300 CAPSULE ORAL at 13:03

## 2019-12-27 RX ADMIN — BUPIVACAINE HYDROCHLORIDE 20 ML: 5 INJECTION, SOLUTION EPIDURAL; INTRACAUDAL at 13:20

## 2019-12-27 RX ADMIN — SODIUM CHLORIDE, SODIUM LACTATE, POTASSIUM CHLORIDE, CALCIUM CHLORIDE: 600; 310; 30; 20 INJECTION, SOLUTION INTRAVENOUS at 13:04

## 2019-12-27 ASSESSMENT — MIFFLIN-ST. JEOR: SCORE: 1013.56

## 2019-12-27 NOTE — DISCHARGE INSTRUCTIONS
"Information about liposomal bupivacaine (Exparel)    What is Liposomal Bupivacaine?    Liposomal Bupivacaine is a numbing medication that can help you manage your pain after surgery.  This medication is similar to \"novacaine,\" which is often used by the dentist.  Liposomal bupivacaine is released slowly and can help control pain for up to 72 hours.    What is the purpose of Liposomal Bupivacaine?    To manage your pain after surgery    To help you sleep better, take deep breaths, walk more comfortable, and feel up to visiting with others    How is the procedure done?    Liposomal bupivacaine is a medication given by an injection.    It is usually given right before your surgery.  If this is the case, you will be awake or sedated, but you should experience minimal pain during the procedure.    For some people, the injection may be given at the very end of your surgery.  It all depends on the type of surgery and your situation.    The procedure usually takes about 5-15 minutes.  An ultrasound machine will help the anesthesiologist insert it in the right place or the surgeon will inject it under direct vision.     A needle is used to place the numbing medication under your skin.  It provides pain relief by numbing the tissue in the area where your surgeon will make the incision.    What can I expect?    You may experience numbness, tingling, or a feeling of heaviness around the area that was injected.    If you experience any of the follow symptoms IMMEDIATELY CALL THE REGIONAL ANESTHESIA PAIN SERVICE:    Numbness or tingling occurs in areas other than around the injection site    Blurry vision    Ringing in your ears    A metallic taste in your mouth    PAGE: Dial [phone].  When prompted, enter the following 4-digit ID number:  [id number].  You will be prompted to enter your phone number; and then enter the # sign.  The clinician on call will call you back.    OR    CALL: Dial [phone].  Let the hospital  " know that you are having a problem with a nerve block and that you would like to speak to the regional anesthesia pain service right away.    You should not receive any other type of numbing medication within 4 days after receiving liposomal bupivacaine unless your anesthesiologist approves.      Post Operative Instructions: Regional Anesthetic for Upper Extremity with Liposomal Bupivacaine  General Information:   Regional anesthesia is when local anesthetic or  numbing  medication is injected around the nerves to anesthetize or  numb  the area supplied by that set of nerves. It is a type of analgesia used to control pain and decreases the need for narcotics following surgery.    Types of Regional Blocks:  Interscalene: A block injected into the neck on the operative shoulder/arm of a patient having shoulder surgery  Supraclavicular: A block injected near the clavicle on the operative shoulder of a patient having elbow, forearm, or hand surgery    Procedure:  The type of anesthesia your doctor used to numb your shoulder or arm will usually not start to wear off for 24-48 hours, but may last as long as 72 hours. You should be careful during that period, since it is possible to injure your arm without being aware of the injury. While your arm is numb, you should:    Avoid striking or bumping your arm    Avoid extreme hot or cold    Diet:  There are no restrictions on your diet. You should drink plenty of fluids.     Discomfort:  You will have a tingling and prickly sensation in your arm as the feeling begins to return. You can also expect some discomfort. The amount of discomfort is unpredictable, but if you have more pain than can be controlled with pain medication you should notify your physician.     Pain Medications:  Begin taking your oral pain pills before bedtime and during the night to avoid a sudden onset of pain as part of the block wears off.  Do not engage in drinking, driving, or hazardous occupations  while taking pain medication.     Stitches:   You may have stitches or special skin closures. You doctor will inform you when to return to the office to have them removed.     Activity:  On the day of surgery you should try to stay in bed with your hand elevated on pillows. You may resume your normal activity after that, wearing a sling for comfort. Contact your physician if you have any of the problems:     Continued numbness or tingling in the arm or hand after 72 hours    Swelling of the fingers or fingers that are cold to the touch    Excessive bleeding or drainage    Severe pain  [facility]  Home Care Following Anesthesia  For 24 hours after surgery:  1. Get plenty of rest.  A responsible adult must stay with you for at least 24 hours after you leave the surgery center.  2. Do not drive or use heavy equipment.  If you have weakness or tingling, don't drive or use heavy equipment until this feeling goes away.   3. Do not drink alcohol.   4. Avoid strenuous or risky activities.  Ask for help when climbing stairs.  5. You may feel lightheaded.  IF so, sit for a few minutes before standing.  Have someone help you get up.   6. If you have nausea (feel sick to your stomach): Drink only clear liquids such as apple juice, ginger ale, broth or 7-Up.  Rest may also help.  Be sure to drink enough fluids.  Move to a regular diet as you feel able.   7. You may have a slight fever.  Call the doctor if your fever is over 100 F (37.7 C) (taken under the tongue) or lasts longer than 24 hours.  8. You may have a dry mouth, a sore throat, muscle aches or trouble sleeping. These should go away after 24 hours.  9. Do not make important or legal decisions.               Tips for taking pain medications  To get the best pain relief possible, remember these points:    Take pain medications as directed, before pain becomes severe.    Pain medication can upset your stomach: taking it with food may  help.    Constipation is a common side effect of pain medication. Drink plenty of  fluids.    Eat foods high in fiber. Take a stool softener if recommended by your doctor or pharmacist.    Do not drink alcohol, drive or operate machinery while taking pain medications.    Ask about other ways to control pain, such as with heat, ice or relaxation.    Tylenol/Acetaminophen Consumption  To help encourage the safe use of acetaminophen, the makers of TYLENOL  have lowered the maximum daily dose for single-ingredient Extra Strength TYLENOL  (acetaminophen) products sold in the [place] from 8 pills per day (4,000 mg) to 6 pills per day (3,000 mg). The dosing interval has also changed from 2 pills every 4-6 hours to 2 pills every 6 hours.    If you feel your pain relief is insufficient, you may take Tylenol/Acetaminophen in addition to your narcotic pain medication.     Be careful not to exceed 3,000 mg of Tylenol/Acetaminophen in a 24 hour period from all sources.    If you are taking extra strength Tylenol/acetaminophen (500 mg), the maximum dose is 6 tablets in 24 hours.    If you are taking regular strength acetaminophen (325 mg), the maximum dose is 9 tablets in 24 hours.    Call a doctor for any of the followin. Signs of infection (fever, growing tenderness at the surgery site, a large amount of drainage or bleeding, severe pain, foul-smelling drainage, redness, swelling).  2. It has been over 8 to 10 hours since surgery and you are still not able to urinate (pass water).  3. Headache for over 24 hours.  4. Numbness, tingling or weakness the day after surgery (if you had spinal anesthesia).  Your doctor is:  Dr. Mehran Salgado, Orthopaedics: [phone]                    Or dial [phone] and ask for the resident on call for:  Orthopaedics  For emergency care, call the:  [facility] Emergency Department: [phone] (TTY for hearing impaired: [phone])

## 2019-12-27 NOTE — ANESTHESIA PREPROCEDURE EVALUATION
Anesthesia Pre-Procedure Evaluation    Patient: Nisha Perez   MRN:     [id number] Gender:   female   Age:    64 year old :      1955        Preoperative Diagnosis: Primary osteoarthritis of first carpometacarpal joint of left hand [M18.12]   Procedure(s):  Right thumb carpometacarpal joint arthroplasty     Past Medical History:   Diagnosis Date     Anxiety disorder      Benign essential hypertension 10/8/2019     Gastro-oesophageal reflux disease     esophagitis     Injury of knee, left      Insomnia      Osteoarthritis      Osteoporosis       Past Surgical History:   Procedure Laterality Date     APPENDECTOMY       ARTHRODESIS ANKLE      left, staples      ARTHROPLASTY CARPOMETACARPAL (THUMB JOINT) Left 10/11/2019    Procedure: Left Thumb Carpal Metacarpal Arthroplasty;  Surgeon: Mehran Salgado MD;  Location: [facility]     ARTHROPLASTY KNEE Right 3/28/2019    Procedure: Right Total Knee Arthroplasty;  Surgeon: Monie Dai MD;  Location: [facility]     ARTHROPLASTY PATELLO-FEMORAL (KNEE)  2011    Procedure:ARTHROPLASTY PATELLO-FEMORAL (KNEE); Left Knee [place] Arthrolplasty Prosthesis, Removal of hardware left knee; Surgeon:MONIE DAI; [facility]     ARTHROSCOPY KNEE      left     ARTHROSCOPY KNEE      right, maureen      SECTION      X 4     COMBINED ESOPHAGOSCOPY, GASTROSCOPY, DUODENOSCOPY (EGD) WITH CO2 INSUFFLATION N/A 2019    Procedure: ESOPHAGOGASTRODUODENOSCOPY, WITH CO2 INSUFFLATION;  Surgeon: Juan M Cox MD;  Location: [facility]     ESOPHAGOSCOPY, GASTROSCOPY, DUODENOSCOPY (EGD), COMBINED N/A 2019    Procedure: Esophagogastroduodenoscopy, With Biopsy;  Surgeon: Juan M Cox MD;  Location: [facility]     LYSIS OF ADHESIONS KNEE Right 2019    Procedure: Right Knee Mini Open Lysis of Adhesions;  Surgeon: Monie Dai MD;  [facility]: [facility]     ORTHOPEDIC SURGERY      left knee partial knee replacement     RELEASE CARPAL  TUNNEL Left 10/11/2019    Procedure: Left Carpal Tunnel Release;  Surgeon: Mehran Salgado MD;  Location: [facility]     TONSILLECTOMY            Anesthesia Evaluation     . Pt has had prior anesthetic. Type: General, MAC and Regional           ROS/MED HX    ENT/Pulmonary:  - neg pulmonary ROS   (+), recent URI . .    Neurologic:  - neg neurologic ROS     Cardiovascular:  - neg cardiovascular ROS   (+) hypertension----. : . . . :. . Previous cardiac testing date:results:date: results:ECG reviewed date: results:Sinus Rhythm   - Negative precordial T-waves.     WITHIN NORMAL LIMITS date: results:          METS/Exercise Tolerance:  >4 METS   Hematologic:  - neg hematologic  ROS       Musculoskeletal:   (+) arthritis,  -       GI/Hepatic:     (+) GERD Asymptomatic on medication,       Renal/Genitourinary:         Endo:  - neg endo ROS       Psychiatric:     (+) psychiatric history anxiety      Infectious Disease:  - neg infectious disease ROS       Malignancy:      - no malignancy   Other:    - neg other ROS                     PHYSICAL EXAM:   Mental Status/Neuro: A/A/O   Airway: Facies: Feasible  Mallampati: I  Mouth/Opening: Full  TM distance: > 6 cm  Neck ROM: Full   Respiratory: Auscultation: CTAB     Resp. Rate: Normal     Resp. Effort: Normal      CV: Rhythm: Regular  Rate: Age appropriate  Heart: Normal Sounds  Edema: None   Comments:      Dental: Normal Dentition                LABS:  CBC:   Lab Results   Component Value Date    WBC 4.5 03/11/2019    HGB 13.8 10/01/2019    HGB 13.7 07/19/2019    HCT 44.4 03/11/2019     03/11/2019     BMP:   Lab Results   Component Value Date     03/11/2019    POTASSIUM 4.0 03/11/2019    POTASSIUM 4.4 07/22/2011    CHLORIDE 106 03/11/2019    CO2 27 03/11/2019    BUN 11 03/11/2019    CR 0.69 03/11/2019     (H) 03/28/2019    GLC 98 03/11/2019     COAGS: No results found for: PTT, INR, FIBR  POC:   Lab Results   Component Value Date    BGM 79 07/22/2011  "    OTHER:   Lab Results   Component Value Date    LEEANN 9.0 03/11/2019    TSH 1.43 06/19/2018        Preop Vitals    BP Readings from Last 3 Encounters:   12/27/19 (!) 149/91   12/19/19 130/78   10/11/19 (!) 143/93    Pulse Readings from Last 3 Encounters:   12/27/19 74   12/19/19 73   10/11/19 78      Resp Readings from Last 3 Encounters:   12/27/19 18   12/19/19 16   10/11/19 17    SpO2 Readings from Last 3 Encounters:   12/27/19 96%   12/19/19 98%   10/11/19 93%      Temp Readings from Last 1 Encounters:   12/27/19 37.4  C (99.4  F) (Tympanic)    Ht Readings from Last 1 Encounters:   12/27/19 1.448 m (4' 9\")      Wt Readings from Last 1 Encounters:   12/27/19 59 kg (130 lb)    Estimated body mass index is 28.13 kg/m  as calculated from the following:    Height as of this encounter: 1.448 m (4' 9\").    Weight as of this encounter: 59 kg (130 lb).     LDA:  Peripheral IV 07/21/11 Left Hand (Active)   Number of days: 3081       Closed/Suction Drain Left Knee [employer] 10 [place] (Active)   Number of days: 3081        Assessment:   ASA SCORE: 2    H&P: History and physical reviewed and following examination; no interval change.   Smoking Status:  Non-Smoker/Unknown   NPO Status: NPO Appropriate     Plan:   Anes. Type:  MAC; Peripheral Nerve Block     Block Details: Single Shot; Exparel; Infraclav.   Pre-Medication: None   Induction:  IV (Standard)   Airway: Native Airway   Access/Monitoring: PIV   Maintenance: Propofol Sedation     Postop Plan:   Postop Pain: None  Postop Sedation/Airway: Not planned     PONV Management:   Adult Risk Factors: Female, Non-Smoker   Prevention: Ondansetron, Propofol     CONSENT: Direct conversation   Plan and risks discussed with: Patient                      Mehran Valdovinos MD  "

## 2019-12-27 NOTE — ANESTHESIA POSTPROCEDURE EVALUATION
Anesthesia POST Procedure Evaluation    Patient: Nisha Perez   MRN:     [id number] Gender:   female   Age:    64 year old :      1955        Preoperative Diagnosis: Primary osteoarthritis of first carpometacarpal joint of left hand [M18.12]   Procedure(s):  Right thumb carpometacarpal joint arthroplasty   Postop Comments: No value filed.       Anesthesia Type:  Not documented  MAC, Peripheral Nerve Block    Reportable Event: NO     PAIN: Uncomplicated   Sign Out status: Comfortable, Well controlled pain     PONV: No PONV   Sign Out status:  No Nausea or Vomiting     Neuro/Psych: Uneventful perioperative course   Sign Out Status: Preoperative baseline; Age appropriate mentation     Airway/Resp.: Uneventful perioperative course   Sign Out Status: Non labored breathing, age appropriate RR; Resp. Status within EXPECTED Parameters     CV: Uneventful perioperative course   Sign Out status: Appropriate BP and perfusion indices; Appropriate HR/Rhythm     Disposition:   Sign Out in:  PACU  Disposition:  Phase II; Home  Recovery Course: Uneventful  Follow-Up: Not required           Last Anesthesia Record Vitals:  CRNA VITALS  2019 1434 - 2019 1534      2019             Resp Rate (set):  10          Last PACU Vitals:  Vitals Value Taken Time   BP     Temp     Pulse     Resp     SpO2     Temp src     NIBP 128/87 2019  2:56 PM   Pulse 86 2019  3:01 PM   SpO2 96 % 2019  3:01 PM   Resp     Temp     Ht Rate 82 2019  2:59 PM   Temp 2           Electronically Signed By: Joni Apodaca MD, MD, 2019, 5:16 PM

## 2019-12-27 NOTE — ANESTHESIA PROCEDURE NOTES
Peripheral Nerve Block Procedure Note    Staff:     Anesthesiologist:  Joni Apodaca MD    Resident/CRNA:  Mehran Valdovinos MD    Block performed by resident/CRNA in the presence of a teaching physician    Location: Pre-op  Procedure Start/Stop TImes:     patient identified, IV checked, site marked, risks and benefits discussed, informed consent, monitors and equipment checked, pre-op evaluation, at physician/surgeon's request and post-op pain management      Correct Patient: Yes      Correct Position: Yes      Correct Site: Yes      Correct Procedure: Yes      Correct Laterality:  Yes    Site Marked:  Yes  Procedure details:     Procedure:  Infraclavicular    Laterality:  Right    Position:  Supine    Sterile Prep: chloraprep, mask and sterile gloves      Local skin infiltration:  1% lidocaine    amount (mL):  3    Needle:  Short bevel    Needle gauge:  21    Needle length (mm):  100    Ultrasound: Yes      Ultrasound used to identify targeted nerve, plexus, or vascular structure and placed a needle adjacent to it      Permanent Image entered into patiient's record      Abnormal pain on injection: No      Blood Aspirated: No      Paresthesias:  No    Bleeding at site: No      Test dose negative for signs of intravascular injection: Yes      Bolus via:  Needle    Infusion Method:  Single Shot    Blood aspirated via catheter: No      Complications:  None  Assessment/Narrative:     Injection made incrementally with aspirations every (mL):  5         Patient/Caregiver provided printed discharge information.

## 2019-12-27 NOTE — OR NURSING
Patient received right side Supraclavicular nerve block  without Exparel.  Fentanyl 50mcg and Versed 1mg given. Tolerated procedure well.

## 2019-12-27 NOTE — ANESTHESIA CARE TRANSFER NOTE
Patient: Nisha Perez    Procedure(s):  Right thumb carpometacarpal joint arthroplasty    Diagnosis: Primary osteoarthritis of first carpometacarpal joint of left hand [M18.12]  Diagnosis Additional Information: No value filed.    Anesthesia Type:   MAC, Peripheral Nerve Block     Note:  Airway :Room Air  Patient transferred to:Phase II  Handoff Report: Identifed the Patient, Identified the Reponsible Provider, Reviewed the pertinent medical history, Discussed the surgical course, Reviewed Intra-OP anesthesia mangement and issues during anesthesia, Set expectations for post-procedure period and Allowed opportunity for questions and acknowledgement of understanding      Vitals: (Last set prior to Anesthesia Care Transfer)    CRNA VITALS  12/27/2019 1434 - 12/27/2019 1508      12/27/2019             Resp Rate (set):  10                Electronically Signed By: MAKENNA Garza CRNA  December 27, 2019  3:08 PM

## 2019-12-28 NOTE — OP NOTE
Procedure Date: 12/27/2019      PREOPERATIVE DIAGNOSIS:  Right thumb CMC joint osteoarthritis.      POSTOPERATIVE DIAGNOSIS:  Right thumb CMC joint osteoarthritis.      PROCEDURE:  Right thumb CMC arthroplasty, flexor carpi radialis tendon transfer and interposition.      SURGEON:  Mehran Salgado MD      ASSISTANT:  Uriel Fernandes MD, PGY-4.      ANESTHESIA:  Regional block.      HISTORY OF PRESENT ILLNESS AND INDICATIONS:  Nisha is a 64-year-old female with a history of right thumb CMC joint osteoarthritis.  This has been unresponsive to nonoperative care and she presents today for surgery.  She is status post a left thumb CMC arthroplasty previously and is doing well from that standpoint.  We discussed the risks and benefits.  All questions were answered and operative consents were signed.      PROCEDURE NOTE:  The patient was given a regional block to the right upper extremity by the Anesthesia Service.  She was taken to the OR and placed supine on an OR table.  All bony prominences were well padded.  Right hand was placed on a hand table.  Tourniquet placed to the right upper extremity.  Right upper extremity was prepped and draped in standard sterile fashion.  Limb was exsanguinated, tourniquet elevated to 250 mmHg.  Curvilinear incision was made over the thumb CMC joint.  Soft tissues were dissected down to the radial sensory nerve and its small branches and these were carefully protected.  The interval between the APL and EPB was identified, a longitudinal capsulotomy was created, full-thickness flaps were elevated.  The trapezium was identified.  There were advanced arthritic changes between the trapezium and the thumb metacarpal base as well as between the trapezium and the scaphoid more proximally.  The trapezium was excised completely.  The wound was irrigated copiously.  The flexor carpi radialis tendon was harvested through a 1 cm transverse incision proximally at its musculotendinous  junction.  A tenotomy was performed of the flexor carpi radialis tendon through this wound.  The tendon was then transferred into the distal wound.  The proximal wound was irrigated and closed with 5-0 nylon.  The base of the thumb metacarpal was rongeured back to healthy-appearing cancellous bone.  An Arthrex 2.4 mm anchor was placed into the base of the thumb metacarpal.  The FCR was then transferred to the base of the thumb metacarpal and secured with 2-0 FiberWire attached to the anchor, thus suspending the thumb metacarpal.  The remainder of the FCR tendon was woven into the space previously occupied by the trapezium and secured tightly with 2-0 FiberWire.  The capsule was closed tightly with 2-0 FiberWire.  The wound was irrigated copiously.  The skin was closed with 5-0 nylon.  She was placed in a well-padded thumb spica splint.  Blood loss approximately 5 mL.  There were no complications.  Salty was taken to the recovery room in stable condition.         ABILIO LANIER MD             D: 2019   T: 2019   MT: petty      Name:     SALTY CAST   MRN:      [id number]        Account:        [id number]   :      1955           Procedure Date: 2019      Document: [id number]

## 2020-01-03 ENCOUNTER — THERAPY VISIT (OUTPATIENT)
Dept: OCCUPATIONAL THERAPY | Facility: CLINIC | Age: 65
End: 2020-01-03
Payer: COMMERCIAL

## 2020-01-03 ENCOUNTER — OFFICE VISIT (OUTPATIENT)
Dept: ORTHOPEDICS | Facility: CLINIC | Age: 65
End: 2020-01-03
Payer: COMMERCIAL

## 2020-01-03 DIAGNOSIS — M25.641 FINGER STIFFNESS, RIGHT: ICD-10-CM

## 2020-01-03 DIAGNOSIS — M18.12 PRIMARY OSTEOARTHRITIS OF FIRST CARPOMETACARPAL JOINT OF LEFT HAND: Primary | ICD-10-CM

## 2020-01-03 DIAGNOSIS — M18.11 PRIMARY OSTEOARTHRITIS OF FIRST CARPOMETACARPAL JOINT OF RIGHT HAND: ICD-10-CM

## 2020-01-03 DIAGNOSIS — M79.644 THUMB PAIN, RIGHT: Primary | ICD-10-CM

## 2020-01-03 PROBLEM — M18.0 PRIMARY OSTEOARTHRITIS OF BOTH FIRST CARPOMETACARPAL JOINTS: Status: RESOLVED | Noted: 2019-10-18 | Resolved: 2020-01-03

## 2020-01-03 PROBLEM — G56.02 CARPAL TUNNEL SYNDROME OF LEFT WRIST: Status: RESOLVED | Noted: 2019-10-18 | Resolved: 2020-01-03

## 2020-01-03 PROBLEM — M79.645 PAIN OF FINGER OF LEFT HAND: Status: RESOLVED | Noted: 2019-10-18 | Resolved: 2020-01-03

## 2020-01-03 PROCEDURE — 97760 ORTHOTIC MGMT&TRAING 1ST ENC: CPT | Mod: GO | Performed by: OCCUPATIONAL THERAPIST

## 2020-01-03 PROCEDURE — 97165 OT EVAL LOW COMPLEX 30 MIN: CPT | Mod: GO | Performed by: OCCUPATIONAL THERAPIST

## 2020-01-03 PROCEDURE — 97110 THERAPEUTIC EXERCISES: CPT | Mod: GO | Performed by: OCCUPATIONAL THERAPIST

## 2020-01-03 NOTE — LETTER
1/3/2020       RE: Nisha Perez  [address] [place] [id number]     Dear Colleague,    Thank you for referring your patient, Nisha Perez, to the [facility] at [facility]. Please see a copy of my visit note below.        Department of Orthopedic Surgery  Clinic Progress Note        PATIENT NAME: Nisha Perez   MRN: [id number]  AGE: 64 year old    PROCEDURE: Right CMC arthroplasty, left CMC arthroplasty and carpal tunnel release  DATE OF SURGERY: 12/27/2019, 10/11/2019    INTERVAL HISTORY:  Nisha Perez is a 64 year old female who presents 1 week status post right CMC plasty, 3-month status post left CMC arthroplasty with carpal tunnel release.  She states that overall she is doing very well.  She states that she tolerated her splint and did not denies much in way of pain.  She reports only needing single narcotic tablet postoperatively, otherwise been well managed rating 2-3/10 severity.  She reports some altered sensation along the radial side of the thumb, but reports sensation is intact otherwise in all fingers.  She states that her swelling is been improving in the fingers.  She denies any concerns with drainage from her incisions.  No fevers or chills.  Regarding her left thumb, she notes that she continues to notice progress both regarding motion as well as strength.  Her sensation in the median nerve distribution is improving.      PHYSICAL EXAMINATION:  General: awake, alert, cooperative, no apparent distress, appears stated age, very pleasant  Respiratory: breathing non-labored on room air, no wheezing  Cardiovascular: RRR on peripheral exam    Musculoskeletal:  Right Upper Extremity: Well-healing incisions over the base of the thumb, and proximal volar forearm, sutures intact, no concerns for infection.  Resolving postoperative swelling and ecchymosis. Motor intact distally with finger flexion/extension/intrinsics.  She is able to  fire EPL FPL.  ISABEL m/r/u nerve distributions. Radial pulse palpable, 2+.         ASSESSMENT/PLAN: Nisha Perez is a 64 year old female who presents for routine postoperative evaluation 1 week status post right CMC arthroplasty, 3 months status post left CMC arthroplasty with carpal tunnel release, recovering appropriately.    At this time, we discussed that we will plan to remove sutures in place Steri-Strips today.  We will have her see hand therapy today for fitting of a custom thumb spica splint which she should have in place that all times other than hygiene.  We will plan to see her back in approximately 4 weeks for repeat evaluation, sooner if there is any questions or concerns.    Patient expressed a good understanding and agreement. All questions answered.         Patient was seen, discussed, and personally evaluated by Dr. Salgado who agrees with the above assessment and plan.       Uriel Fernandes MD  Orthopedic Surgery PGY-4      Again, thank you for allowing me to participate in the care of your patient.      Sincerely,    Mehran Salgado MD

## 2020-01-03 NOTE — PROGRESS NOTES
Department of Orthopedic Surgery  Clinic Progress Note        PATIENT NAME: Nisha Perez   MRN: [id number]  AGE: 64 year old    PROCEDURE: Right CMC arthroplasty, left CMC arthroplasty and carpal tunnel release  DATE OF SURGERY: 12/27/2019, 10/11/2019    INTERVAL HISTORY:  Nisha Perez is a 64 year old female who presents 1 week status post right CMC plasty, 3-month status post left CMC arthroplasty with carpal tunnel release.  She states that overall she is doing very well.  She states that she tolerated her splint and did not denies much in way of pain.  She reports only needing single narcotic tablet postoperatively, otherwise been well managed rating 2-3/10 severity.  She reports some altered sensation along the radial side of the thumb, but reports sensation is intact otherwise in all fingers.  She states that her swelling is been improving in the fingers.  She denies any concerns with drainage from her incisions.  No fevers or chills.  Regarding her left thumb, she notes that she continues to notice progress both regarding motion as well as strength.  Her sensation in the median nerve distribution is improving.      PHYSICAL EXAMINATION:  General: awake, alert, cooperative, no apparent distress, appears stated age, very pleasant  Respiratory: breathing non-labored on room air, no wheezing  Cardiovascular: RRR on peripheral exam    Musculoskeletal:  Right Upper Extremity: Well-healing incisions over the base of the thumb, and proximal volar forearm, sutures intact, no concerns for infection.  Resolving postoperative swelling and ecchymosis. Motor intact distally with finger flexion/extension/intrinsics.  She is able to fire EPL FPL.  SILT m/r/u nerve distributions. Radial pulse palpable, 2+.         ASSESSMENT/PLAN: Nisha Perez is a 64 year old female who presents for routine postoperative evaluation 1 week status post right CMC arthroplasty, 3 months status post left CMC arthroplasty with carpal  tunnel release, recovering appropriately.    At this time, we discussed that we will plan to remove sutures in place Steri-Strips today.  We will have her see hand therapy today for fitting of a custom thumb spica splint which she should have in place that all times other than hygiene.  We will plan to see her back in approximately 4 weeks for repeat evaluation, sooner if there is any questions or concerns.    Patient expressed a good understanding and agreement. All questions answered.         Patient was seen, discussed, and personally evaluated by Dr. Salgado who agrees with the above assessment and plan.       Uriel Fernandes MD  Orthopedic Surgery PGY-4    Patient was seen and examined with the resident.  I agree with the assessment and plan of care.

## 2020-01-03 NOTE — NURSING NOTE
Reason For Visit:   Chief Complaint   Patient presents with     RECHECK     DOS 12/27/19 Right thumb CMC arthroplasty       Primary MD: Emerson Spear    Age: 64 year old    ?  No      There were no vitals taken for this visit.      Pain Assessment  Patient Currently in Pain: Yes  0-10 Pain Scale: 3  Primary Pain Location: Finger (Comment which one)(Right thumb)               QuickDASH Assessment  No flowsheet data found.       Current Outpatient Medications   Medication Sig Dispense Refill     acetaminophen (TYLENOL) 325 MG tablet Take 3 tablets (975 mg) by mouth every 8 hours 100 tablet 0     cetirizine (ZYRTEC) 5 MG tablet Take 5 mg by mouth daily       esomeprazole (NEXIUM) 40 MG DR capsule Take 40 mg by mouth every morning (before breakfast) Take 30-60 minutes before eating.       fluticasone (FLONASE) 50 MCG/ACT nasal spray Spray 1 spray into both nostrils 2 times daily       ibuprofen (ADVIL/MOTRIN) 100 MG tablet Take 100 mg by mouth every 4 hours as needed       levocetirizine (XYZAL) 5 MG tablet Take 5 mg by mouth every evening       oxyCODONE-acetaminophen (PERCOCET) 5-325 MG tablet Take 1-2 tablets by mouth every 6 hours as needed for severe pain 15 tablet 0     Pseudoephedrine HCl (SUDAFED PO) Take  by mouth at onset of headache.       zolpidem ER (AMBIEN CR) 6.25 MG CR tablet Take 6.25 mg by mouth nightly as needed for sleep         Allergies   Allergen Reactions     Latex Itching     burning     Latex      PN: Converted from LW Latex Sensitivity Flag       Day Cota, ATC

## 2020-01-03 NOTE — PROGRESS NOTES
"Hand Therapy Initial Evaluation    Current Date:  1/3/2020    Diagnosis:  Right  thumb CMC OA  DOI:  2/15/19  DOS:  12/27/19  Procedure:  CMC arthroplasty  Post:  1w 0d  Referring MD: Mehran Salgado MD    Next MD visit: 1/31/20      Initial Subjective:  Nisha Perez is a 64 year old  right  hand dominant female.    Patient reports symptoms of pain, stiffness/loss of motion, weakness/loss of strength and edema of the right hand and thumb which occurred due to recent surgery. Since onset symptoms are Gradually getting better.  Special tests:  x-ray.  Previous treatment: none for the RUE.    General health as reported by patient is good.   Pertinent medical history includes:osteoarthritis.  Medical allergies: latex.  Surgeries include:  Orthopedic surgery. Other surgery history details: 2 ankles fused, 2 TKA, and L wrist surgery cyst and ligaments (\"took a bone out\"), Left Thumb CMC arthroplasty.  Current medications:  Other. Other medications details: nexium and Ambien, and Tylenol .   Primary job tasks include:  Other. Other job/home tasks details: home.  . Since onset symptoms are gradually improving. Special tests:  X-ray. Previous treatment includes physical therapy and hand therapy for Left Thumb. There was none improvement following previous treatment.     Occupational Profile Information:  Patient is Retired from [employer] .  Restrictions include:  Currently not working due to present treatment.  Prior functional level:  no limitations  Barriers include:none  Mobility: No difficulty  Transportation: drives  Leisure activities/hobbies: Garden, Kayak and sew, caring for grandkids  Additional Occupational Profile Information (patterns of daily living, interests, values and needs):Pt reports difficulty with bathing, dressing, eating/feeding self, hygiene, toileting, household chores, sports/recreation, pushing, pulling, lifting and carrying    Functional Outcome Measure:  1/3/2020  Upper Extremity " Functional Index Score:  SCORE:   Column Totals: /80: 9   (A lower score indicates greater disability.)        Objective:  Observation: Color/Temperature:     []    Normal  [x]    Abnormal mild ecchymosis    Pain Level (Scale 0-10):   1/3/2020   At Rest 2   With Use 2-3     Pain Description:  Date 1/3/2020    right   Location  thumb   Pain Quality Aching, Sharp, Shooting and Tender   Frequency intermittent     Pain is worst  daytime   Exacerbated by  move it too much   Relieved by rest   Progression Gradually getting better.        ROM: Wrist and Thumb [x]   Contraindicated  ROM fingers noted to be WNL with mild flex and ext lag noted with AROM in the clinic    Strength:  [x]   Contraindicated  Edema:  mild of the  hand and thumb  Wound/Scar: tender and clean without drainage  Sensation:  WNL throughout all nerve distributions; per patient report      Assessment:   Patient presents with symptoms consistent with above diagnosis,  with surgical  intervention.     Patient's limitations or Problem List includes:  Pain, Decreased ROM/motion, Increased edema and Decreased stability of the right wrist, hand and thumb which interferes with the patient's ability to perform Self Care Tasks (dressing, eating, bathing, hygiene/toileting), Recreational Activities, Household Chores and Driving  as compared to previous level of function.    Rehab Potential:  Excellent - Return to full activity, no limitations    Patient will benefit from skilled Occupational Therapy to increase ROM, flexibility, overall strength, stability of wrist, stability of thumb, coordination and dexterity and decrease pain, edema and adherence of scarring to return to previous activity level and resume normal daily tasks and to reach their rehab potential.    Barriers to Learning:  No barrier    Communication Issues:  Patient appears to be able to clearly communicate and understand verbal and written communication and follow directions correctly.  Chart  Review: Chart Review, Brief history including review of medical and/or therapy records relating to the presenting problem and Simple history review with patient    Identified Performance Deficits: bathing/showering, toileting, dressing, feeding, hygiene and grooming, care of others, driving and community mobility, home establishment and management, meal preparation and cleanup, shopping, play and leisure activities    Assessment of Occupational Performance:  5 or more Performance Deficits    Clinical Decision Making (Complexity): Low complexity    Treatment Explanation:  The following has been discussed with the patient:  RX ordered/plan of care  Anticipated outcomes  Possible risks and side effects    Treatment Plan:   Frequency:  1 X a month, once daily  Duration:  for 1 months tapering to 3 X a month over 2 months     Therapeutic Exercise:  Tendon Gliding  Self Care:  Ergonomic Considerations  Orthotic Fabrication: Static forearm based thumb spica orthosis    Discharge Plan:  Achieve all LTG  [place] in home treatment program.  Reach maximal therapeutic benefit.  Please refer to the daily flowsheet for treatment today and total treatment time.      Home Exercise Program:  Wear orthosis full time, remove only for showerss    Next Visit:  Progress AROM per MD in one month   Scar management

## 2020-01-17 ENCOUNTER — TELEPHONE (OUTPATIENT)
Dept: ORTHOPEDICS | Facility: CLINIC | Age: 65
End: 2020-01-17

## 2020-01-17 NOTE — TELEPHONE ENCOUNTER
"Appointment was actually made for the 31st as patient had said, it was on a \"hold\" status and was not yet visible on schedule. Ortho staff placed the appointment into schedule and  will call patient back to inform her. Darleen De Jesus RN    "
[facility] Call Center    Phone Message    May a detailed message be left on voicemail: yes    Reason for Call: Other: Pt stated that at her last appt. with Dr. Salgado someone in the clinic scheduled her for two appts. on 1/31/20. (A 10am with Dr. Salgado, and a 11am with [facility]) Chart shows that on 1/3/20 only the [facility] center appt was made. Pt is upset because she was told an appt had been scheduled for both. Pt requesting an appt on 1/31/20 with Dr. Salgado, but he is going out into March. Please call back to discuss.     Action Taken: Message routed to:  [facility]o    
Speaking Coherently

## 2020-01-31 ENCOUNTER — THERAPY VISIT (OUTPATIENT)
Dept: OCCUPATIONAL THERAPY | Facility: CLINIC | Age: 65
End: 2020-01-31
Payer: COMMERCIAL

## 2020-01-31 ENCOUNTER — OFFICE VISIT (OUTPATIENT)
Dept: ORTHOPEDICS | Facility: CLINIC | Age: 65
End: 2020-01-31
Payer: COMMERCIAL

## 2020-01-31 DIAGNOSIS — M18.11 PRIMARY OSTEOARTHRITIS OF FIRST CARPOMETACARPAL JOINT OF RIGHT HAND: ICD-10-CM

## 2020-01-31 DIAGNOSIS — M25.641 FINGER STIFFNESS, RIGHT: Primary | ICD-10-CM

## 2020-01-31 DIAGNOSIS — M18.0 PRIMARY OSTEOARTHRITIS OF BOTH FIRST CARPOMETACARPAL JOINTS: Primary | ICD-10-CM

## 2020-01-31 PROCEDURE — 97110 THERAPEUTIC EXERCISES: CPT | Mod: GO | Performed by: OCCUPATIONAL THERAPIST

## 2020-01-31 PROCEDURE — 97763 ORTHC/PROSTC MGMT SBSQ ENC: CPT | Mod: GO | Performed by: OCCUPATIONAL THERAPIST

## 2020-01-31 NOTE — PROGRESS NOTES
Hand Therapy Progress Note    Current Date:  1/31/2020      Diagnosis:  Right  thumb CMC OA  DOI:  2/15/19  DOS:  12/27/19  Procedure:  CMC arthroplasty  Post:  1 month RUE  Referring MD: Mehran Salgado MD    Next MD visit: 1/31/20      S:  Subjective changes as noted by patient: notes the fingers are moving well, and ready for moving out of the forearm based splint. Pain is less in the right hand. Wrist is a little sore after the cast came off today   Functional changes noted by patient: Improvement in Self Care Tasks (dressing, bathing) and Household Chores  Response to previous treatment:  good  Patient has noted adverse reaction to:   None         Occupational Profile Information:  Patient is Retired from [employer] .  Restrictions include:  Currently starting to work some, easy use of the right hand  Prior functional level:  no limitations  Barriers include:none  Mobility: No difficulty  Transportation: drives  Leisure activities/hobbies: Garden, Kayak and sew, caring for grandkids  Additional Occupational Profile Information (patterns of daily living, interests, values and needs):Pt reports difficulty with bathing, dressing, eating/feeding self, hygiene, toileting, household chores, sports/recreation, pushing, pulling, lifting and carrying    Functional Outcome Measure:  1/3/2020  Upper Extremity Functional Index Score:  SCORE:   Column Totals: /80: 9   (A lower score indicates greater disability.)    1/31/2020  Upper Extremity Functional Index Score:  SCORE:   Column Totals: /80: 35   (A lower score indicates greater disability.)      Objective:  Observation: Color/Temperature:     [x]    Normal     Pain Level (Scale 0-10):   1/3/2020 1/31   At Rest 2 0-1   With Use 2-3 2     Pain Description:  Date 1/3/2020 1/31    right    Location  thumb    Pain Quality Aching, Sharp, Shooting and Tender Aching, much less sharp and shooting. Only occasionally   Frequency intermittent      Pain is worst  daytime     Exacerbated by  move it too much    Relieved by rest    Progression Gradually getting better.  improving       ROM:  Thumb 1/31/2020   AROM(PROM) Right   MP 0/35   IP 0845   RAbd 40   PAbd 45   Retropulsion    Kapandji Opposition Scale (0-10/10) 4     Wrist 1/31/2020   AROM(PROM) Right   Extension 67   Flexion 45   RD 12   UD 35   Supination    Pronation          Strength:  [x]   Contraindicated  Edema:  mild of the  hand and thumb  Wound/Scar: tender and clean without drainage  Sensation:  WNL throughout all nerve distributions; per patient report    Assessment:  Response to therapy has been improvement to:  ROM of Wrist:  All Planes  Thumb:  All Planes  Pain:  frequency is less, intensity of pain is decreased, duration of pain is decreased and less tender over affected area  Self Care Skills:  Improved use of the right hand, still cautious, No pinching    Overall Assessment:  Patient's symptoms are resolving.  Patient is ready to progress to more complex exercises.  Patient is becoming more independent in home exercise program  STG/LTG:  STGoals have been reviewed and progress or achievement has occurred;  see goal sheet for details and updates.  LTGoals have been reviewed and progress or achievement has occurred:  see goal sheet for details and updates.  I have re-evaluated this patient and find that the nature, scope, duration and intensity of the therapy is appropriate for the medical condition of the patient.        Treatment Plan:   Frequency:  1 X a month, once daily  Duration:  for 1 months tapering to 3 X a month over 2 months     Therapeutic Exercise:  Tendon Gliding  Self Care:  Ergonomic Considerations  Orthotic Fabrication: Static forearm based thumb spica orthosis    Discharge Plan:  Achieve all LTG  [place] in home treatment program.  Reach maximal therapeutic benefit.  Please refer to the daily flowsheet for treatment today and total treatment time.      Home Exercise Program:  Wear orthosis  full time, remove only for showerss  1/31/2020    Next Visit:  Progress AROM per MD in one month   Scar management

## 2020-01-31 NOTE — NURSING NOTE
Reason For Visit:   Chief Complaint   Patient presents with     RECHECK     DOS 12/27/19 Right thumb CMC arthroplasty       Primary MD: Emerson Spear  Age: 64 year old    ?  No      There were no vitals taken for this visit.      Pain Assessment  Patient Currently in Pain: Yes  0-10 Pain Scale: 2               QuickDASH Assessment  No flowsheet data found.       Current Outpatient Medications   Medication Sig Dispense Refill     acetaminophen (TYLENOL) 325 MG tablet Take 3 tablets (975 mg) by mouth every 8 hours 100 tablet 0     cetirizine (ZYRTEC) 5 MG tablet Take 5 mg by mouth daily       esomeprazole (NEXIUM) 40 MG DR capsule Take 40 mg by mouth every morning (before breakfast) Take 30-60 minutes before eating.       fluticasone (FLONASE) 50 MCG/ACT nasal spray Spray 1 spray into both nostrils 2 times daily       ibuprofen (ADVIL/MOTRIN) 100 MG tablet Take 100 mg by mouth every 4 hours as needed       levocetirizine (XYZAL) 5 MG tablet Take 5 mg by mouth every evening       oxyCODONE-acetaminophen (PERCOCET) 5-325 MG tablet Take 1-2 tablets by mouth every 6 hours as needed for severe pain 15 tablet 0     Pseudoephedrine HCl (SUDAFED PO) Take  by mouth at onset of headache.       zolpidem ER (AMBIEN CR) 6.25 MG CR tablet Take 6.25 mg by mouth nightly as needed for sleep         Allergies   Allergen Reactions     Latex Itching     burning     Latex      PN: Converted from LW Latex Sensitivity Flag       Day Cota, CHANDAN

## 2020-01-31 NOTE — PROGRESS NOTES
Follow-up bilateral thumb CMC arthroplasties.  Doing well.  Making nice progress bilaterally.  Working on strengthening on the left.  She still been splinted on the right.  She is about 5 weeks out now from the right side.  No fevers, no chills.  Getting back into her normal activities now.    The past medical history was reviewed and documented in the chart.  This includes medications, surgeries, social history as well as review of systems.    Physical examination both hands demonstrates nicely healed incisions no signs of infection.  No motor, no sensory deficits.  Minimal tenderness over the thumb CMC joints now.  No motor, no sensory deficits are noted.  No significant atrophy.  There is brisk capillary refill in all digits and a palpable radial pulse.  No overlying skin changes noted.    Impression:  Status post bilateral thumb CMC arthroplasties, left carpal tunnel release    Plan:  Nisha and I discussed a home exercise program.  She will continue and finish out her course of therapy.  I think she is made excellent progress overall.  She should go on to a full recovery.  We can see her back in a couple of months or on an as-needed basis.  She does have our contact information should any problems arise.

## 2020-01-31 NOTE — LETTER
1/31/2020       RE: Nisha Perez  [address] [place] [phone]     Dear Colleague,    Thank you for referring your patient, Nisha Perez, to the [facility] at [facility]. Please see a copy of my visit note below.    Follow-up bilateral thumb CMC arthroplasties.  Doing well.  Making nice progress bilaterally.  Working on strengthening on the left.  She still been splinted on the right.  She is about 5 weeks out now from the right side.  No fevers, no chills.  Getting back into her normal activities now.    The past medical history was reviewed and documented in the chart.  This includes medications, surgeries, social history as well as review of systems.    Physical examination both hands demonstrates nicely healed incisions no signs of infection.  No motor, no sensory deficits.  Minimal tenderness over the thumb CMC joints now.  No motor, no sensory deficits are noted.  No significant atrophy.  There is brisk capillary refill in all digits and a palpable radial pulse.  No overlying skin changes noted.    Impression:  Status post bilateral thumb CMC arthroplasties, left carpal tunnel release    Plan:  Nisha and I discussed a home exercise program.  She will continue and finish out her course of therapy.  I think she is made excellent progress overall.  She should go on to a full recovery.  We can see her back in a couple of months or on an as-needed basis.  She does have our contact information should any problems arise.    Again, thank you for allowing me to participate in the care of your patient.      Sincerely,    Mehran Salgado MD

## 2020-02-19 DIAGNOSIS — Z96.652 STATUS POST LEFT KNEE REPLACEMENT: ICD-10-CM

## 2020-02-19 RX ORDER — AMOXICILLIN 500 MG/1
CAPSULE ORAL
Qty: 4 CAPSULE | Refills: 2 | Status: SHIPPED | OUTPATIENT
Start: 2020-02-19 | End: 2021-03-03

## 2020-02-19 NOTE — TELEPHONE ENCOUNTER
[facility] Call Center    Phone Message    May a detailed message be left on voicemail: yes     Reason for Call: Medication Refill Request    Has the patient contacted the pharmacy for the refill? Yes   Name of medication being requested: AMOXICILLIN 500 MG PO capsule   Provider who prescribed the medication: Dr. Dai  Pharmacy: [facility] [address] [place] [id number] [facility]  Date medication is needed: ASAP; pt states she has a dental cleaning tomorrow at noon, and is hoping to have this approved ASAP.     Action Taken: Message routed to:  [facility] Ortho    Travel Screening: Not Applicable

## 2020-07-28 ENCOUNTER — TELEPHONE (OUTPATIENT)
Dept: GASTROENTEROLOGY | Facility: CLINIC | Age: 65
End: 2020-07-28

## 2020-07-28 NOTE — LETTER
July 31, 2020      Nisha ALLAN Ana  [address] [place] [phone]        Dear ,    We are writing to inform you of your test results.    {results letter list:[id number]}    No results found from the In Basket message.    If you have any questions or concerns, please call the clinic at the number listed above.       Sincerely,        Juan M Cox MD

## 2020-07-28 NOTE — TELEPHONE ENCOUNTER
[facility] Call Center    Phone Message    May a detailed message be left on voicemail: yes     Reason for Call: Order(s): Other:   Reason for requested: EGD yearly  Date needed: ASAP  Provider name: Dr Cox    Patient requesting new order be placed for EGD. Patient was told to have another in 1 year per Dr Cox. Patient had last on 06/2019. Please call patient to discuss.       Action Taken: Message routed to:  Adult Clinics: Gastroenterology (GI) p [id number]    Travel Screening: Not Applicable

## 2020-07-28 NOTE — TELEPHONE ENCOUNTER
Chart reviewed and GI procedure referral order extended. Will send message to GI [facility] with request to review and follow up on this on Wednesday.    Attempted to reach patient by phone, but no answer. Left generic message for patient stating that we received the message and that we will follow up on Wednesday. Informed patient to call with any questions in the meantime.    Anastasiya Dorantes RN, BSN

## 2020-07-30 NOTE — TELEPHONE ENCOUNTER
Patient called writer with a question on how to proceed with her testing.    Patient wanted to follow up in  6 mo from her last EGD and hasn't been able to because of covid clinic practices and her previous scheduled surgeries.    Dr. Cox wanted to schedule her for another EGD a year from her last procedure, which was in June 2019,  with a follow up.    Patient is having no symptoms, feels good and has cut back her Nexium dosage to 20 mg daily instead of the prescribed 40 mg. She thinks that she may not need further testing and patient said she really isn't looking for another EGD.  She is currently looking for Dr. Cox advice on whether she should follow through with the one year follow up EGD and follow up. Please advise.  Mg Andrea MA

## 2020-07-31 DIAGNOSIS — Z11.59 ENCOUNTER FOR SCREENING FOR OTHER VIRAL DISEASES: Primary | ICD-10-CM

## 2020-07-31 NOTE — TELEPHONE ENCOUNTER
Pt would like to proceed with scheduling EGD.     Warm transfer to [facility] in [facility] scheduling.    Cassie eMndoza, RN  Gastroenterology Care Coordinator  [place], [place]     Acitretin Pregnancy And Lactation Text: This medication is Pregnancy Category X and should not be given to women who are pregnant or may become pregnant in the future. This medication is excreted in breast milk.

## 2020-08-28 ENCOUNTER — ANESTHESIA EVENT (OUTPATIENT)
Dept: SURGERY | Facility: AMBULATORY SURGERY CENTER | Age: 65
End: 2020-08-28

## 2020-08-28 DIAGNOSIS — Z11.59 ENCOUNTER FOR SCREENING FOR OTHER VIRAL DISEASES: ICD-10-CM

## 2020-08-28 PROCEDURE — U0003 INFECTIOUS AGENT DETECTION BY NUCLEIC ACID (DNA OR RNA); SEVERE ACUTE RESPIRATORY SYNDROME CORONAVIRUS 2 (SARS-COV-2) (CORONAVIRUS DISEASE [COVID-19]), AMPLIFIED PROBE TECHNIQUE, MAKING USE OF HIGH THROUGHPUT TECHNOLOGIES AS DESCRIBED BY CMS-2020-01-R: HCPCS | Performed by: INTERNAL MEDICINE

## 2020-08-28 RX ORDER — FENTANYL CITRATE 50 UG/ML
25-50 INJECTION, SOLUTION INTRAMUSCULAR; INTRAVENOUS
Status: CANCELLED | OUTPATIENT
Start: 2020-08-28

## 2020-08-28 RX ORDER — HYDRALAZINE HYDROCHLORIDE 20 MG/ML
2.5-5 INJECTION INTRAMUSCULAR; INTRAVENOUS EVERY 10 MIN PRN
Status: CANCELLED | OUTPATIENT
Start: 2020-08-28

## 2020-08-28 RX ORDER — METOPROLOL TARTRATE 1 MG/ML
1-2 INJECTION, SOLUTION INTRAVENOUS EVERY 5 MIN PRN
Status: CANCELLED | OUTPATIENT
Start: 2020-08-28

## 2020-08-28 RX ORDER — ALBUTEROL SULFATE 0.83 MG/ML
2.5 SOLUTION RESPIRATORY (INHALATION) EVERY 4 HOURS PRN
Status: CANCELLED | OUTPATIENT
Start: 2020-08-28

## 2020-08-29 LAB
SARS-COV-2 RNA SPEC QL NAA+PROBE: NOT DETECTED
SPECIMEN SOURCE: NORMAL

## 2020-08-31 ENCOUNTER — ANESTHESIA (OUTPATIENT)
Dept: SURGERY | Facility: AMBULATORY SURGERY CENTER | Age: 65
End: 2020-08-31
Payer: COMMERCIAL

## 2020-08-31 ENCOUNTER — HOSPITAL ENCOUNTER (OUTPATIENT)
Facility: AMBULATORY SURGERY CENTER | Age: 65
Discharge: HOME | End: 2020-08-31
Attending: INTERNAL MEDICINE | Admitting: INTERNAL MEDICINE
Payer: COMMERCIAL

## 2020-08-31 ENCOUNTER — SURGERY (OUTPATIENT)
Age: 65
End: 2020-08-31
Payer: COMMERCIAL

## 2020-08-31 VITALS
HEART RATE: 80 BPM | RESPIRATION RATE: 16 BRPM | SYSTOLIC BLOOD PRESSURE: 134 MMHG | DIASTOLIC BLOOD PRESSURE: 76 MMHG | TEMPERATURE: 97 F | OXYGEN SATURATION: 99 %

## 2020-08-31 VITALS — HEART RATE: 85 BPM

## 2020-08-31 LAB — UPPER GI ENDOSCOPY: NORMAL

## 2020-08-31 PROCEDURE — G8918 PT W/O PREOP ORDER IV AB PRO: HCPCS

## 2020-08-31 PROCEDURE — 43239 EGD BIOPSY SINGLE/MULTIPLE: CPT

## 2020-08-31 PROCEDURE — G8907 PT DOC NO EVENTS ON DISCHARG: HCPCS

## 2020-08-31 PROCEDURE — 88305 TISSUE EXAM BY PATHOLOGIST: CPT | Performed by: INTERNAL MEDICINE

## 2020-08-31 PROCEDURE — 43239 EGD BIOPSY SINGLE/MULTIPLE: CPT | Performed by: INTERNAL MEDICINE

## 2020-08-31 RX ORDER — LIDOCAINE 40 MG/G
CREAM TOPICAL
Status: DISCONTINUED | OUTPATIENT
Start: 2020-08-31 | End: 2020-09-01 | Stop reason: HOSPADM

## 2020-08-31 RX ORDER — ONDANSETRON 4 MG/1
4 TABLET, ORALLY DISINTEGRATING ORAL EVERY 30 MIN PRN
Status: DISCONTINUED | OUTPATIENT
Start: 2020-08-31 | End: 2020-09-01 | Stop reason: HOSPADM

## 2020-08-31 RX ORDER — ONDANSETRON 2 MG/ML
4 INJECTION INTRAMUSCULAR; INTRAVENOUS EVERY 6 HOURS PRN
Status: DISCONTINUED | OUTPATIENT
Start: 2020-08-31 | End: 2020-09-01 | Stop reason: HOSPADM

## 2020-08-31 RX ORDER — PROPOFOL 10 MG/ML
INJECTION, EMULSION INTRAVENOUS PRN
Status: DISCONTINUED | OUTPATIENT
Start: 2020-08-31 | End: 2020-08-31

## 2020-08-31 RX ORDER — NALOXONE HYDROCHLORIDE 0.4 MG/ML
.1-.4 INJECTION, SOLUTION INTRAMUSCULAR; INTRAVENOUS; SUBCUTANEOUS
Status: DISCONTINUED | OUTPATIENT
Start: 2020-08-31 | End: 2020-09-01 | Stop reason: HOSPADM

## 2020-08-31 RX ORDER — SODIUM CHLORIDE, SODIUM LACTATE, POTASSIUM CHLORIDE, CALCIUM CHLORIDE 600; 310; 30; 20 MG/100ML; MG/100ML; MG/100ML; MG/100ML
INJECTION, SOLUTION INTRAVENOUS CONTINUOUS
Status: DISCONTINUED | OUTPATIENT
Start: 2020-08-31 | End: 2020-09-01 | Stop reason: HOSPADM

## 2020-08-31 RX ORDER — DEXAMETHASONE SODIUM PHOSPHATE 4 MG/ML
4 INJECTION, SOLUTION INTRA-ARTICULAR; INTRALESIONAL; INTRAMUSCULAR; INTRAVENOUS; SOFT TISSUE EVERY 10 MIN PRN
Status: DISCONTINUED | OUTPATIENT
Start: 2020-08-31 | End: 2020-09-01 | Stop reason: HOSPADM

## 2020-08-31 RX ORDER — FLUMAZENIL 0.1 MG/ML
0.2 INJECTION, SOLUTION INTRAVENOUS
Status: SHIPPED | OUTPATIENT
Start: 2020-08-31 | End: 2020-08-31

## 2020-08-31 RX ORDER — ONDANSETRON 2 MG/ML
4 INJECTION INTRAMUSCULAR; INTRAVENOUS EVERY 30 MIN PRN
Status: DISCONTINUED | OUTPATIENT
Start: 2020-08-31 | End: 2020-09-01 | Stop reason: HOSPADM

## 2020-08-31 RX ORDER — GLYCOPYRROLATE 0.2 MG/ML
INJECTION, SOLUTION INTRAMUSCULAR; INTRAVENOUS PRN
Status: DISCONTINUED | OUTPATIENT
Start: 2020-08-31 | End: 2020-08-31

## 2020-08-31 RX ORDER — ONDANSETRON 2 MG/ML
4 INJECTION INTRAMUSCULAR; INTRAVENOUS
Status: DISCONTINUED | OUTPATIENT
Start: 2020-08-31 | End: 2020-09-01 | Stop reason: HOSPADM

## 2020-08-31 RX ORDER — PROPOFOL 10 MG/ML
INJECTION, EMULSION INTRAVENOUS CONTINUOUS PRN
Status: DISCONTINUED | OUTPATIENT
Start: 2020-08-31 | End: 2020-08-31

## 2020-08-31 RX ORDER — PHYSOSTIGMINE SALICYLATE 1 MG/ML
1.2 INJECTION INTRAVENOUS
Status: DISCONTINUED | OUTPATIENT
Start: 2020-08-31 | End: 2020-09-01 | Stop reason: HOSPADM

## 2020-08-31 RX ORDER — ONDANSETRON 4 MG/1
4 TABLET, ORALLY DISINTEGRATING ORAL EVERY 6 HOURS PRN
Status: DISCONTINUED | OUTPATIENT
Start: 2020-08-31 | End: 2020-09-01 | Stop reason: HOSPADM

## 2020-08-31 RX ORDER — OXYCODONE HYDROCHLORIDE 5 MG/1
10 TABLET ORAL EVERY 4 HOURS PRN
Status: DISCONTINUED | OUTPATIENT
Start: 2020-08-31 | End: 2020-09-01 | Stop reason: HOSPADM

## 2020-08-31 RX ORDER — MEPERIDINE HYDROCHLORIDE 25 MG/ML
12.5 INJECTION INTRAMUSCULAR; INTRAVENOUS; SUBCUTANEOUS
Status: DISCONTINUED | OUTPATIENT
Start: 2020-08-31 | End: 2020-09-01 | Stop reason: HOSPADM

## 2020-08-31 RX ORDER — LIDOCAINE HYDROCHLORIDE 20 MG/ML
INJECTION, SOLUTION INFILTRATION; PERINEURAL PRN
Status: DISCONTINUED | OUTPATIENT
Start: 2020-08-31 | End: 2020-08-31

## 2020-08-31 RX ORDER — ACETAMINOPHEN 325 MG/1
975 TABLET ORAL ONCE
Status: DISCONTINUED | OUTPATIENT
Start: 2020-08-31 | End: 2020-09-01 | Stop reason: HOSPADM

## 2020-08-31 RX ADMIN — PROPOFOL 100 MG: 10 INJECTION, EMULSION INTRAVENOUS at 09:44

## 2020-08-31 RX ADMIN — SODIUM CHLORIDE, SODIUM LACTATE, POTASSIUM CHLORIDE, CALCIUM CHLORIDE: 600; 310; 30; 20 INJECTION, SOLUTION INTRAVENOUS at 09:37

## 2020-08-31 RX ADMIN — PROPOFOL 150 MCG/KG/MIN: 10 INJECTION, EMULSION INTRAVENOUS at 09:44

## 2020-08-31 RX ADMIN — GLYCOPYRROLATE 0.2 MG: 0.2 INJECTION, SOLUTION INTRAMUSCULAR; INTRAVENOUS at 09:43

## 2020-08-31 RX ADMIN — LIDOCAINE HYDROCHLORIDE 100 MG: 20 INJECTION, SOLUTION INFILTRATION; PERINEURAL at 09:44

## 2020-08-31 NOTE — ANESTHESIA CARE TRANSFER NOTE
Patient: Nisha Perez    Procedure(s):  ESOPHAGOGASTRODUODENOSCOPY, WITH CO2 INSUFFLATION  Esophagogastroduodenoscopy, With Lesion Removal Using Snare  Esophagogastroduodenoscopy, With Biopsy    Diagnosis: Gastroesophageal reflux disease, esophagitis presence not specified [K21.9]  Diagnosis Additional Information: No value filed.    Anesthesia Type:   MAC     Note:  Airway :Room Air  Patient transferred to:Phase II  Handoff Report: Identifed the Patient, Identified the Reponsible Provider, Reviewed the pertinent medical history, Discussed the surgical course, Reviewed Intra-OP anesthesia mangement and issues during anesthesia, Set expectations for post-procedure period and Allowed opportunity for questions and acknowledgement of understanding      Vitals: (Last set prior to Anesthesia Care Transfer)    CRNA VITALS  8/31/2020 0940 - 8/31/2020 1016      8/31/2020             Pulse:  89    SpO2:  99 %                Electronically Signed By: MAKENNA Dallas CRNA  August 31, 2020  10:16 AM

## 2020-08-31 NOTE — ANESTHESIA POSTPROCEDURE EVALUATION
Anesthesia POST Procedure Evaluation    Patient: Nisha Perez   MRN:     [id number] Gender:   female   Age:    65 year old :      1955        Preoperative Diagnosis: Gastroesophageal reflux disease, esophagitis presence not specified [K21.9]   Procedure(s):  ESOPHAGOGASTRODUODENOSCOPY, WITH CO2 INSUFFLATION  Esophagogastroduodenoscopy, With Lesion Removal Using Snare  Esophagogastroduodenoscopy, With Biopsy   Postop Comments: No value filed.     Anesthesia Type: MAC       Disposition: Outpatient   Postop Pain Control: Uneventful            Sign Out: Well controlled pain   PONV: No   Neuro/Psych: Uneventful            Sign Out: Acceptable/Baseline neuro status   Airway/Respiratory: Uneventful            Sign Out: Acceptable/Baseline resp. status   CV/Hemodynamics: Uneventful            Sign Out: Acceptable CV status   Other NRE: NONE   DID A NON-ROUTINE EVENT OCCUR? No         Last Anesthesia Record Vitals:  CRNA VITALS  2020 0937 - 2020 1037      2020             Pulse:  89    SpO2:  99 %          Last PACU Vitals:  Vitals Value Taken Time   /84 2020 10:09 AM   Temp     Pulse 88 2020 10:09 AM   Resp 16 2020 10:09 AM   SpO2 95 % 2020 10:09 AM   Temp src Available 2020 10:02 AM   NIBP 127/90 2020 10:04 AM   Pulse 89 2020 10:07 AM   SpO2 99 % 2020 10:07 AM   Resp 15 2020 10:06 AM   Temp     Ht Rate 90 2020 10:06 AM   Temp 2           Electronically Signed By: Chuy Nagel MD, 2020, 10:38 AM

## 2020-08-31 NOTE — ANESTHESIA PREPROCEDURE EVALUATION
"Anesthesia Pre-Procedure Evaluation    Patient: Nisha Perez   MRN:     [id number] Gender:   female   Age:    65 year old :      1955        Preoperative Diagnosis: Gastroesophageal reflux disease, esophagitis presence not specified [K21.9]   Procedure(s):  ESOPHAGOGASTRODUODENOSCOPY, WITH CO2 INSUFFLATION     LABS:  CBC:   Lab Results   Component Value Date    WBC 4.5 2019    HGB 13.8 10/01/2019    HGB 13.7 2019    HCT 44.4 2019     2019     BMP:   Lab Results   Component Value Date     2019    POTASSIUM 4.0 2019    POTASSIUM 4.4 2011    CHLORIDE 106 2019    CO2 27 2019    BUN 11 2019    CR 0.69 2019     (H) 2019    GLC 98 2019     COAGS: No results found for: PTT, INR, FIBR  POC:   Lab Results   Component Value Date    BGM 79 2011     OTHER:   Lab Results   Component Value Date    LEEANN 9.0 2019    TSH 1.43 2018        Preop Vitals    BP Readings from Last 3 Encounters:   20 (!) 162/88   19 (!) 130/90   19 130/78    Pulse Readings from Last 3 Encounters:   19 74   19 73   10/11/19 78      Resp Readings from Last 3 Encounters:   20 18   19 16   19 16    SpO2 Readings from Last 3 Encounters:   20 98%   19 97%   19 98%      Temp Readings from Last 1 Encounters:   20 97  F (36.1  C) (Temporal)    Ht Readings from Last 1 Encounters:   19 1.448 m (4' 9\")      Wt Readings from Last 1 Encounters:   19 59 kg (130 lb)    Estimated body mass index is 28.13 kg/m  as calculated from the following:    Height as of 19: 1.448 m (4' 9\").    Weight as of 19: 59 kg (130 lb).     LDA:  Peripheral IV 11 Left Hand (Active)   Number of days: 3329       Peripheral IV 20 Right Hand (Active)   Site Assessment WDL 20 0833   Line Status Saline locked 20 0833   Phlebitis Scale 0-->no symptoms 20 " 0833   Infiltration Scale 0 20 0833   Infiltration Site Treatment Method  None 20 0833   Extravasation? No 20 0833   Dressing Intervention New dressing  20 0833   Number of days: 0       Closed/Suction Drain Left Knee [employer] 10 Maldivian (Active)   Number of days: 3329        Past Medical History:   Diagnosis Date     Anxiety disorder      Benign essential hypertension 10/8/2019     Gastro-oesophageal reflux disease     esophagitis     Injury of knee, left      Insomnia      Osteoarthritis      Osteoporosis       Past Surgical History:   Procedure Laterality Date     APPENDECTOMY       ARTHRODESIS ANKLE      left, staples      ARTHROPLASTY CARPOMETACARPAL (THUMB JOINT) Left 10/11/2019    Procedure: Left Thumb Carpal Metacarpal Arthroplasty;  Surgeon: Mehran Salgado MD;  Location: [facility]     ARTHROPLASTY CARPOMETACARPAL (THUMB JOINT) Right 2019    Procedure: Right thumb carpometacarpal joint arthroplasty;  Surgeon: Mehran Salgado MD;  Location: [facility]     ARTHROPLASTY KNEE Right 3/28/2019    Procedure: Right Total Knee Arthroplasty;  Surgeon: Monie Lloyd MD;  Location: [facility]     ARTHROPLASTY PATELLO-FEMORAL (KNEE)  2011    Procedure:ARTHROPLASTY PATELLO-FEMORAL (KNEE); Left Knee Latia Arthrolplasty Prosthesis, Removal of hardware left knee; Surgeon:MONIE LLOYD; Location[facility]     ARTHROSCOPY KNEE      left     ARTHROSCOPY KNEE      right, maureen      SECTION      X 4     COMBINED ESOPHAGOSCOPY, GASTROSCOPY, DUODENOSCOPY (EGD) WITH CO2 INSUFFLATION N/A 2019    Procedure: ESOPHAGOGASTRODUODENOSCOPY, WITH CO2 INSUFFLATION;  Surgeon: Juan M Cox MD;  Location: [facility]     ESOPHAGOSCOPY, GASTROSCOPY, DUODENOSCOPY (EGD), COMBINED N/A 2019    Procedure: Esophagogastroduodenoscopy, With Biopsy;  Surgeon: Juan M Cox MD;  Location: [facility] OR     LYSIS OF ADHESIONS KNEE Right 2019    Procedure: Right Knee Mini  Open Lysis of Adhesions;  Surgeon: Monie Dai MD;  Location:  OR     ORTHOPEDIC SURGERY      left knee partial knee replacement     RELEASE CARPAL TUNNEL Left 10/11/2019    Procedure: Left Carpal Tunnel Release;  Surgeon: Mehran Salgado MD;  Location:  OR     TONSILLECTOMY        Allergies   Allergen Reactions     Latex Itching     burning     Latex      PN: Converted from LW Latex Sensitivity Flag        Anesthesia Evaluation     . Pt has had prior anesthetic. Type: MAC    No history of anesthetic complications          ROS/MED HX    ENT/Pulmonary:       Neurologic:  - neg neurologic ROS     Cardiovascular:     (+) hypertension----. : . . . :. .       METS/Exercise Tolerance:     Hematologic:  - neg hematologic  ROS       Musculoskeletal:  - neg musculoskeletal ROS       GI/Hepatic:     (+) GERD Asymptomatic on medication,       Renal/Genitourinary:  - ROS Renal section negative       Endo:  - neg endo ROS       Psychiatric:     (+) psychiatric history anxiety      Infectious Disease:  - neg infectious disease ROS       Malignancy:      - no malignancy   Other:    - neg other ROS                     PHYSICAL EXAM:   Mental Status/Neuro: A/A/O   Airway: Facies: Feasible  Mallampati: I  Mouth/Opening: Full  TM distance: > 6 cm  Neck ROM: Full   Respiratory: Auscultation: CTAB     Resp. Rate: Normal     Resp. Effort: Normal      CV: Rhythm: Regular  Rate: Age appropriate  Heart: Normal Sounds  Edema: None   Comments:      Dental: Normal Dentition                Assessment:   ASA SCORE: 2    H&P: History and physical reviewed and following examination; no interval change.   Smoking Status:  Non-Smoker/Unknown   NPO Status: NPO Appropriate     Plan:   Anes. Type:  MAC   Pre-Medication: None   Induction:  IV (Standard)   Airway: Native Airway   Access/Monitoring: PIV   Maintenance: Propofol Sedation     Postop Plan:   Postop Pain: None  Postop Sedation/Airway: Not planned     PONV Management:    Adult Risk Factors: Female, Non-Smoker   Prevention: Ondansetron, Propofol     CONSENT: Direct conversation   Plan and risks discussed with: Patient   Blood Products: Consent Deferred (Minimal Blood Loss)                   Chuy Nagel MD

## 2020-08-31 NOTE — ADDENDUM NOTE
Addendum  created 08/31/20 1240 by Tyrell Valverde APRN CRNA    Flowsheet accepted, Flowsheet data copied forward, Intraprocedure Event edited, Intraprocedure Flowsheets edited

## 2020-09-02 LAB — COPATH REPORT: NORMAL

## 2021-01-10 ENCOUNTER — HEALTH MAINTENANCE LETTER (OUTPATIENT)
Age: 66
End: 2021-01-10

## 2021-03-03 DIAGNOSIS — Z96.652 STATUS POST LEFT KNEE REPLACEMENT: ICD-10-CM

## 2021-03-03 RX ORDER — AMOXICILLIN 500 MG/1
CAPSULE ORAL
Qty: 4 CAPSULE | Refills: 0 | Status: SHIPPED | OUTPATIENT
Start: 2021-03-03 | End: 2022-02-14

## 2021-10-23 ENCOUNTER — HEALTH MAINTENANCE LETTER (OUTPATIENT)
Age: 66
End: 2021-10-23

## 2022-02-12 ENCOUNTER — HEALTH MAINTENANCE LETTER (OUTPATIENT)
Age: 67
End: 2022-02-12

## 2022-02-14 ENCOUNTER — TELEPHONE (OUTPATIENT)
Dept: ORTHOPEDICS | Facility: CLINIC | Age: 67
End: 2022-02-14

## 2022-02-14 DIAGNOSIS — Z96.652 STATUS POST LEFT KNEE REPLACEMENT: ICD-10-CM

## 2022-02-14 RX ORDER — AMOXICILLIN 500 MG/1
CAPSULE ORAL
Qty: 4 CAPSULE | Refills: 3 | Status: SHIPPED | OUTPATIENT
Start: 2022-02-14

## 2022-02-14 NOTE — TELEPHONE ENCOUNTER
Returned call to patient to let her know that antibiotic was sent electronically to her requested pharmacy. Understanding expressed.

## 2022-02-14 NOTE — TELEPHONE ENCOUNTER
[facility] Call Center    Phone Message    May a detailed message be left on voicemail: yes     Reason for Call: Other: Need antibiotic for dental work scheduled for tomorrow. Patient would like a call back.     Action Taken: Message routed to:  [facility] ([facility]): Orthopedics    Travel Screening: Not Applicable

## 2022-10-09 ENCOUNTER — HEALTH MAINTENANCE LETTER (OUTPATIENT)
Age: 67
End: 2022-10-09

## 2022-11-26 ENCOUNTER — HEALTH MAINTENANCE LETTER (OUTPATIENT)
Age: 67
End: 2022-11-26

## 2023-02-18 ENCOUNTER — HEALTH MAINTENANCE LETTER (OUTPATIENT)
Age: 68
End: 2023-02-18

## 2023-06-13 NOTE — TELEPHONE ENCOUNTER
Attempted to reach out to patient to discuss accommodating her request of having surgery on both hands before the new year. Left detailed message informing patient we can schedule one surgery on 9/20 and schedule the other hand on 12/20. Informed patient that I have both on hold for her and requested she call back to confirm. Left best call back number of [phone]  
Spoke with patient to reschedule surgery for left hand with Dr. Salgado to 10/11.   Will plan on having right hand done on 12/27.   
No

## 2024-01-06 ENCOUNTER — HEALTH MAINTENANCE LETTER (OUTPATIENT)
Age: 69
End: 2024-01-06

## 2024-03-16 ENCOUNTER — HEALTH MAINTENANCE LETTER (OUTPATIENT)
Age: 69
End: 2024-03-16

## 2024-12-15 NOTE — TELEPHONE ENCOUNTER
Summary:    Patient is due/failing the following:   PAP    Action needed:   Schedule a PAP    Type of outreach:    Phone, spoke to patient.  patient reports UTD and was completed at [facility]     Questions for provider review:    None                                                                                                                                    Ofelia Riggs       Chart routed to Care Team .        Panel Management Review      Patient has the following on her problem list: None      Composite cancer screening  Chart review shows that this patient is due/due soon for the following Pap Smear   SPOKE WITH JEFFREY AT [facility]. HAVE PRECERT APPROVAL FOR PT TO TRANSFER TO [facility] WHEN MED CLEARED. AUTH APPROVED UNTIL 12/17/24. PT HGB 6.4. JEFFREY UPDATED.

## (undated) DEVICE — PREP SKIN SCRUB TRAY 4461A

## (undated) DEVICE — GLOVE PROTEXIS POWDER FREE 8.0 ORTHOPEDIC 2D73ET80

## (undated) DEVICE — SOL WATER IRRIG 1000ML BOTTLE 2F7114

## (undated) DEVICE — CAST PADDING 6" STERILE 9046S

## (undated) DEVICE — SPONGE LAP 18X18" X8435

## (undated) DEVICE — COVER CAMERA IN-LIGHT DISP LT-C02

## (undated) DEVICE — GOWN XLG DISP 9545

## (undated) DEVICE — PREP POVIDONE IODINE SOLUTION 10% 120ML

## (undated) DEVICE — TOURNIQUET SGL BLADDER 18"X4" RED 5921-218-135

## (undated) DEVICE — ESU PENCIL W/SMOKE EVAC NEPTUNE STRYKER 0703-046-000

## (undated) DEVICE — SU DERMABOND PRINEO 22CM CLR222US

## (undated) DEVICE — CAST PADDING 4" STERILE 9044S

## (undated) DEVICE — Device

## (undated) DEVICE — CAST PADDING 4" COTTON WEBRIL STERILE 9084S

## (undated) DEVICE — GLOVE PROTEXIS W/NEU-THERA 8.0  2D73TE80

## (undated) DEVICE — BNDG SPANDAGRIP SZ G LF BEIGE 4.5" SAG13145

## (undated) DEVICE — SUCTION MANIFOLD NEPTUNE 2 SYS 1 PORT 702-025-000

## (undated) DEVICE — BLADE KNIFE SURG 10 371110

## (undated) DEVICE — LINEN TOWEL PACK X5 5464

## (undated) DEVICE — BLADE KNIFE BEAVER MINI BEAVER6700

## (undated) DEVICE — HOOD FLYTE W/PEELAWAY 408-800-100

## (undated) DEVICE — SLING ARM MED 79-99155

## (undated) DEVICE — TOURNIQUET CUFF 30" REPRO BLUE 60-7070-105

## (undated) DEVICE — SLING ARM LG 79-99157

## (undated) DEVICE — SU MONOCRYL 3-0 PS-1 27" Y936H

## (undated) DEVICE — BONE CLEANING TIP INTERPULSE  0210-010-000

## (undated) DEVICE — LIGHT HANDLE X1 31140133

## (undated) DEVICE — DRAPE STERI TOWEL LG 1010

## (undated) DEVICE — SU ETHIBOND 1 CT-1 30" X425H

## (undated) DEVICE — RX BACITRACIN OINTMENT 0.9G 1/32OZ CUR001109

## (undated) DEVICE — PACK HAND CUSTOM ASC

## (undated) DEVICE — LINEN ORTHO PACK 5446

## (undated) DEVICE — SOL NACL 0.9% IRRIG 1000ML BOTTLE 2F7124

## (undated) DEVICE — TUBING IRRIG TUR Y TYPE 96" LF 6543-01

## (undated) DEVICE — SU VICRYL 2-0 CT-2 27" UND J269H

## (undated) DEVICE — SU VICRYL 1 CT-1 36" UND J947H

## (undated) DEVICE — SUCTION IRR SYSTEM W/O TIP INTERPULSE HANDPIECE 0210-100-000

## (undated) DEVICE — BNDG KLING 2" 2231

## (undated) DEVICE — GLOVE GAMMEX NEOPRENE ULTRA SZ 6.5 LF 8513

## (undated) DEVICE — BONE CEMENT MIXEVAC III HI VAC KIT  0206-015-000

## (undated) DEVICE — GLOVE PROTEXIS BLUE W/NEU-THERA 8.0  2D73EB80

## (undated) DEVICE — SOL WATER IRRIG 500ML BOTTLE 2F7113

## (undated) DEVICE — DRAPE STOCKINETTE 4" 8544

## (undated) DEVICE — GOWN IMPERVIOUS SPECIALTY XLG/XLONG 32474

## (undated) DEVICE — ESU HOLSTER PLASTIC DISP E2400

## (undated) DEVICE — POSITIONER ARMBOARD FOAM 1PAIR LF FP-ARMB1

## (undated) DEVICE — BLADE KNIFE SURG 15 371115

## (undated) DEVICE — PEN MARKING SKIN W/PAPER RULER 31145785

## (undated) DEVICE — PREP CHLORHEXIDINE 4% 4OZ (HIBICLENS) 57504

## (undated) DEVICE — SU VICRYL 1 CT-1 36" J347H

## (undated) DEVICE — PREP CHLORAPREP 26ML TINTED ORANGE  260815

## (undated) DEVICE — STRAP KNEE/BODY 31143004

## (undated) DEVICE — SUCTION MANIFOLD DORNOCH ULTRA CART UL-CL500

## (undated) DEVICE — NDL SPINAL 20GA 3.5" 405182

## (undated) DEVICE — BLADE SAW SAGITTAL STRK 19.5X90X1.27MM 2108-109-000S11

## (undated) DEVICE — ESU GROUND PAD ADULT W/CORD E7507

## (undated) DEVICE — SU VICRYL 1 CT-1 27" J341H

## (undated) DEVICE — DRSG STERI STRIP 1/2X4" R1547

## (undated) DEVICE — GLOVE PROTEXIS POWDER FREE SMT 6.5  2D72PT65X

## (undated) DEVICE — CAST PLASTER SPLINT 4X15" 7394

## (undated) DEVICE — GLOVE PROTEXIS MICRO 6.5  2D73PM65

## (undated) DEVICE — SOL NACL 0.9% IRRIG 500ML BOTTLE 2F7123

## (undated) DEVICE — BASIN SET MAJOR

## (undated) DEVICE — SU ETHILON 5-0 P-3 18" BLACK 698G

## (undated) DEVICE — PREP POVIDONE IODINE SCRUB 7.5% 120ML

## (undated) DEVICE — BNDG ELASTIC 3"X5YDS UNSTERILE 6611-30

## (undated) DEVICE — DRAIN HEMOVAC RESERVOIR KIT 10FR 1/8" MED 00-2550-002-10

## (undated) RX ORDER — PROPOFOL 10 MG/ML
INJECTION, EMULSION INTRAVENOUS
Status: DISPENSED
Start: 2019-06-17

## (undated) RX ORDER — ACETAMINOPHEN 325 MG/1
TABLET ORAL
Status: DISPENSED
Start: 2019-10-11

## (undated) RX ORDER — CEFAZOLIN SODIUM 1 G/3ML
INJECTION, POWDER, FOR SOLUTION INTRAMUSCULAR; INTRAVENOUS
Status: DISPENSED
Start: 2019-03-28

## (undated) RX ORDER — LIDOCAINE HYDROCHLORIDE 20 MG/ML
INJECTION, SOLUTION EPIDURAL; INFILTRATION; INTRACAUDAL; PERINEURAL
Status: DISPENSED
Start: 2019-07-26

## (undated) RX ORDER — TRIAMCINOLONE ACETONIDE 40 MG/ML
INJECTION, SUSPENSION INTRA-ARTICULAR; INTRAMUSCULAR
Status: DISPENSED
Start: 2019-07-26

## (undated) RX ORDER — FENTANYL CITRATE 50 UG/ML
INJECTION, SOLUTION INTRAMUSCULAR; INTRAVENOUS
Status: DISPENSED
Start: 2019-07-26

## (undated) RX ORDER — OXYCODONE HYDROCHLORIDE 5 MG/1
TABLET ORAL
Status: DISPENSED
Start: 2019-10-11

## (undated) RX ORDER — TRANEXAMIC ACID 100 MG/ML
INJECTION, SOLUTION INTRAVENOUS
Status: DISPENSED
Start: 2019-07-26

## (undated) RX ORDER — KETOROLAC TROMETHAMINE 30 MG/ML
INJECTION, SOLUTION INTRAMUSCULAR; INTRAVENOUS
Status: DISPENSED
Start: 2019-03-28

## (undated) RX ORDER — HYDROMORPHONE HYDROCHLORIDE 1 MG/ML
INJECTION, SOLUTION INTRAMUSCULAR; INTRAVENOUS; SUBCUTANEOUS
Status: DISPENSED
Start: 2019-03-28

## (undated) RX ORDER — ONDANSETRON 2 MG/ML
INJECTION INTRAMUSCULAR; INTRAVENOUS
Status: DISPENSED
Start: 2019-07-26

## (undated) RX ORDER — SIMETHICONE 40MG/0.6ML
SUSPENSION, DROPS(FINAL DOSAGE FORM)(ML) ORAL
Status: DISPENSED
Start: 2019-06-17

## (undated) RX ORDER — CELECOXIB 200 MG/1
CAPSULE ORAL
Status: DISPENSED
Start: 2019-03-28

## (undated) RX ORDER — ACETAMINOPHEN 325 MG/1
TABLET ORAL
Status: DISPENSED
Start: 2019-07-26

## (undated) RX ORDER — ACETAMINOPHEN 325 MG/1
TABLET ORAL
Status: DISPENSED
Start: 2019-12-27

## (undated) RX ORDER — CEFAZOLIN SODIUM 1 G/3ML
INJECTION, POWDER, FOR SOLUTION INTRAMUSCULAR; INTRAVENOUS
Status: DISPENSED
Start: 2019-10-11

## (undated) RX ORDER — OXYCODONE HYDROCHLORIDE 5 MG/1
TABLET ORAL
Status: DISPENSED
Start: 2019-07-26

## (undated) RX ORDER — CEFAZOLIN SODIUM 2 G/50ML
SOLUTION INTRAVENOUS
Status: DISPENSED
Start: 2019-12-27

## (undated) RX ORDER — LIDOCAINE HYDROCHLORIDE AND EPINEPHRINE 10; 10 MG/ML; UG/ML
INJECTION, SOLUTION INFILTRATION; PERINEURAL
Status: DISPENSED
Start: 2019-10-11

## (undated) RX ORDER — LIDOCAINE HYDROCHLORIDE 20 MG/ML
INJECTION, SOLUTION EPIDURAL; INFILTRATION; INTRACAUDAL; PERINEURAL
Status: DISPENSED
Start: 2019-10-11

## (undated) RX ORDER — LIDOCAINE HYDROCHLORIDE 20 MG/ML
INJECTION, SOLUTION EPIDURAL; INFILTRATION; INTRACAUDAL; PERINEURAL
Status: DISPENSED
Start: 2019-12-27

## (undated) RX ORDER — ACETAMINOPHEN 325 MG/1
TABLET ORAL
Status: DISPENSED
Start: 2019-03-28

## (undated) RX ORDER — GABAPENTIN 300 MG/1
CAPSULE ORAL
Status: DISPENSED
Start: 2019-07-26

## (undated) RX ORDER — MINERAL OIL
OIL (ML) MISCELLANEOUS
Status: DISPENSED
Start: 2019-07-26

## (undated) RX ORDER — PROPOFOL 10 MG/ML
INJECTION, EMULSION INTRAVENOUS
Status: DISPENSED
Start: 2019-07-26

## (undated) RX ORDER — DEXAMETHASONE SODIUM PHOSPHATE 4 MG/ML
INJECTION, SOLUTION INTRA-ARTICULAR; INTRALESIONAL; INTRAMUSCULAR; INTRAVENOUS; SOFT TISSUE
Status: DISPENSED
Start: 2019-10-11

## (undated) RX ORDER — DEXAMETHASONE SODIUM PHOSPHATE 4 MG/ML
INJECTION, SOLUTION INTRA-ARTICULAR; INTRALESIONAL; INTRAMUSCULAR; INTRAVENOUS; SOFT TISSUE
Status: DISPENSED
Start: 2019-07-26

## (undated) RX ORDER — BACITRACIN 50000 [IU]/1
INJECTION, POWDER, FOR SOLUTION INTRAMUSCULAR
Status: DISPENSED
Start: 2019-07-26

## (undated) RX ORDER — LIDOCAINE HYDROCHLORIDE 10 MG/ML
INJECTION, SOLUTION EPIDURAL; INFILTRATION; INTRACAUDAL; PERINEURAL
Status: DISPENSED
Start: 2019-06-17

## (undated) RX ORDER — PROPOFOL 10 MG/ML
INJECTION, EMULSION INTRAVENOUS
Status: DISPENSED
Start: 2019-03-28

## (undated) RX ORDER — CEFAZOLIN SODIUM 2 G/100ML
INJECTION, SOLUTION INTRAVENOUS
Status: DISPENSED
Start: 2019-03-28

## (undated) RX ORDER — FENTANYL CITRATE 50 UG/ML
INJECTION, SOLUTION INTRAMUSCULAR; INTRAVENOUS
Status: DISPENSED
Start: 2019-12-27

## (undated) RX ORDER — ONDANSETRON 2 MG/ML
INJECTION INTRAMUSCULAR; INTRAVENOUS
Status: DISPENSED
Start: 2019-10-11

## (undated) RX ORDER — ONDANSETRON 2 MG/ML
INJECTION INTRAMUSCULAR; INTRAVENOUS
Status: DISPENSED
Start: 2019-12-27

## (undated) RX ORDER — GABAPENTIN 300 MG/1
CAPSULE ORAL
Status: DISPENSED
Start: 2019-10-11

## (undated) RX ORDER — PROPOFOL 10 MG/ML
INJECTION, EMULSION INTRAVENOUS
Status: DISPENSED
Start: 2019-10-11

## (undated) RX ORDER — GABAPENTIN 300 MG/1
CAPSULE ORAL
Status: DISPENSED
Start: 2019-12-27

## (undated) RX ORDER — PROPOFOL 10 MG/ML
INJECTION, EMULSION INTRAVENOUS
Status: DISPENSED
Start: 2019-12-27

## (undated) RX ORDER — LIDOCAINE HYDROCHLORIDE AND EPINEPHRINE 10; 10 MG/ML; UG/ML
INJECTION, SOLUTION INFILTRATION; PERINEURAL
Status: DISPENSED
Start: 2019-12-27

## (undated) RX ORDER — FENTANYL CITRATE 50 UG/ML
INJECTION, SOLUTION INTRAMUSCULAR; INTRAVENOUS
Status: DISPENSED
Start: 2019-10-11

## (undated) RX ORDER — GABAPENTIN 300 MG/1
CAPSULE ORAL
Status: DISPENSED
Start: 2019-03-28

## (undated) RX ORDER — GENTAMICIN 40 MG/ML
INJECTION, SOLUTION INTRAMUSCULAR; INTRAVENOUS
Status: DISPENSED
Start: 2019-07-26

## (undated) RX ORDER — CEFAZOLIN SODIUM 1 G/3ML
INJECTION, POWDER, FOR SOLUTION INTRAMUSCULAR; INTRAVENOUS
Status: DISPENSED
Start: 2019-07-26

## (undated) RX ORDER — MORPHINE SULFATE 10 MG/ML
INJECTION, SOLUTION INTRAMUSCULAR; INTRAVENOUS
Status: DISPENSED
Start: 2019-03-28

## (undated) RX ORDER — BUPIVACAINE HYDROCHLORIDE 2.5 MG/ML
INJECTION, SOLUTION EPIDURAL; INFILTRATION; INTRACAUDAL
Status: DISPENSED
Start: 2019-07-26

## (undated) RX ORDER — POLYMYXIN B SULFATE 500000 [IU]/1
INJECTION, POWDER, LYOPHILIZED, FOR SOLUTION INTRAMUSCULAR; INTRATHECAL; INTRAVENOUS; OPHTHALMIC
Status: DISPENSED
Start: 2019-07-26

## (undated) RX ORDER — LIDOCAINE HYDROCHLORIDE 10 MG/ML
INJECTION, SOLUTION EPIDURAL; INFILTRATION; INTRACAUDAL; PERINEURAL
Status: DISPENSED
Start: 2019-12-27

## (undated) RX ORDER — KETAMINE HCL IN 0.9 % NACL 50 MG/5 ML
SYRINGE (ML) INTRAVENOUS
Status: DISPENSED
Start: 2019-10-11

## (undated) RX ORDER — FENTANYL CITRATE 50 UG/ML
INJECTION, SOLUTION INTRAMUSCULAR; INTRAVENOUS
Status: DISPENSED
Start: 2019-03-28